# Patient Record
Sex: FEMALE | Race: WHITE | NOT HISPANIC OR LATINO | Employment: OTHER | URBAN - METROPOLITAN AREA
[De-identification: names, ages, dates, MRNs, and addresses within clinical notes are randomized per-mention and may not be internally consistent; named-entity substitution may affect disease eponyms.]

---

## 2023-10-19 ENCOUNTER — APPOINTMENT (OUTPATIENT)
Dept: RADIOLOGY | Facility: HOSPITAL | Age: 63
DRG: 637 | End: 2023-10-19
Payer: COMMERCIAL

## 2023-10-19 ENCOUNTER — HOSPITAL ENCOUNTER (OUTPATIENT)
Dept: CARDIOLOGY | Facility: HOSPITAL | Age: 63
Discharge: HOME | End: 2023-10-19
Payer: COMMERCIAL

## 2023-10-19 ENCOUNTER — HOSPITAL ENCOUNTER (INPATIENT)
Facility: HOSPITAL | Age: 63
LOS: 6 days | Discharge: HOME | DRG: 637 | End: 2023-10-25
Attending: STUDENT IN AN ORGANIZED HEALTH CARE EDUCATION/TRAINING PROGRAM | Admitting: ANESTHESIOLOGY
Payer: COMMERCIAL

## 2023-10-19 DIAGNOSIS — R01.1 HOLOSYSTOLIC MURMUR: ICD-10-CM

## 2023-10-19 DIAGNOSIS — R41.82 ALTERED MENTAL STATUS, UNSPECIFIED ALTERED MENTAL STATUS TYPE: ICD-10-CM

## 2023-10-19 DIAGNOSIS — E11.10 DKA, TYPE 2, NOT AT GOAL (MULTI): Primary | ICD-10-CM

## 2023-10-19 DIAGNOSIS — E87.20 METABOLIC ACIDOSIS: ICD-10-CM

## 2023-10-19 LAB
ALBUMIN SERPL BCP-MCNC: 4.1 G/DL (ref 3.4–5)
ALP SERPL-CCNC: 104 U/L (ref 33–136)
ALT SERPL W P-5'-P-CCNC: 18 U/L (ref 7–45)
ANION GAP SERPL CALC-SCNC: 26 MMOL/L (ref 10–20)
ANION GAP SERPL CALC-SCNC: 29 MMOL/L (ref 10–20)
ANION GAP SERPL CALC-SCNC: 36 MMOL/L (ref 10–20)
APPEARANCE UR: CLEAR
AST SERPL W P-5'-P-CCNC: 18 U/L (ref 9–39)
B-OH-BUTYR SERPL-SCNC: 12.72 MMOL/L (ref 0.02–0.27)
BACTERIA #/AREA URNS AUTO: ABNORMAL /HPF
BASE EXCESS BLDV CALC-SCNC: -17.3 MMOL/L (ref -2–3)
BASE EXCESS BLDV CALC-SCNC: -27.5 MMOL/L (ref -2–3)
BASOPHILS # BLD MANUAL: 0.15 X10*3/UL (ref 0–0.1)
BASOPHILS NFR BLD MANUAL: 1 %
BILIRUB SERPL-MCNC: 0.3 MG/DL (ref 0–1.2)
BILIRUB UR STRIP.AUTO-MCNC: NEGATIVE MG/DL
BODY TEMPERATURE: 37 DEGREES CELSIUS
BODY TEMPERATURE: 37 DEGREES CELSIUS
BUN SERPL-MCNC: 36 MG/DL (ref 6–23)
BUN SERPL-MCNC: 37 MG/DL (ref 6–23)
BUN SERPL-MCNC: 37 MG/DL (ref 6–23)
BURR CELLS BLD QL SMEAR: ABNORMAL
CALCIUM SERPL-MCNC: 10.6 MG/DL (ref 8.6–10.3)
CALCIUM SERPL-MCNC: 10.7 MG/DL (ref 8.6–10.3)
CALCIUM SERPL-MCNC: 9.8 MG/DL (ref 8.6–10.3)
CARDIAC TROPONIN I PNL SERPL HS: 11 NG/L (ref 0–13)
CHLORIDE SERPL-SCNC: 100 MMOL/L (ref 98–107)
CHLORIDE SERPL-SCNC: 101 MMOL/L (ref 98–107)
CHLORIDE SERPL-SCNC: 92 MMOL/L (ref 98–107)
CO2 SERPL-SCNC: 12 MMOL/L (ref 21–32)
CO2 SERPL-SCNC: 5 MMOL/L (ref 21–32)
CO2 SERPL-SCNC: 9 MMOL/L (ref 21–32)
COLOR UR: YELLOW
CREAT SERPL-MCNC: 1.48 MG/DL (ref 0.5–1.05)
CREAT SERPL-MCNC: 1.64 MG/DL (ref 0.5–1.05)
CREAT SERPL-MCNC: 1.77 MG/DL (ref 0.5–1.05)
CRITICAL CALL TIME: 1602
CRITICAL CALL TIME: 2026
CRITICAL CALLED BY: ABNORMAL
CRITICAL CALLED BY: ABNORMAL
CRITICAL CALLED TO: ABNORMAL
CRITICAL CALLED TO: ABNORMAL
CRITICAL NOTE: ABNORMAL
CRITICAL READ BACK: ABNORMAL
CRITICAL READ BACK: ABNORMAL
EOSINOPHIL # BLD MANUAL: 0 X10*3/UL (ref 0–0.7)
EOSINOPHIL NFR BLD MANUAL: 0 %
ERYTHROCYTE [DISTWIDTH] IN BLOOD BY AUTOMATED COUNT: 13.4 % (ref 11.5–14.5)
GFR SERPL CREATININE-BSD FRML MDRD: 32 ML/MIN/1.73M*2
GFR SERPL CREATININE-BSD FRML MDRD: 35 ML/MIN/1.73M*2
GFR SERPL CREATININE-BSD FRML MDRD: 40 ML/MIN/1.73M*2
GLUCOSE BLD MANUAL STRIP-MCNC: 530 MG/DL (ref 74–99)
GLUCOSE BLD MANUAL STRIP-MCNC: 557 MG/DL (ref 74–99)
GLUCOSE BLD MANUAL STRIP-MCNC: >600 MG/DL (ref 74–99)
GLUCOSE SERPL-MCNC: 658 MG/DL (ref 74–99)
GLUCOSE SERPL-MCNC: 658 MG/DL (ref 74–99)
GLUCOSE SERPL-MCNC: 732 MG/DL (ref 74–99)
GLUCOSE SERPL-MCNC: 964 MG/DL (ref 74–99)
GLUCOSE UR STRIP.AUTO-MCNC: ABNORMAL MG/DL
HCO3 BLDV-SCNC: 10 MMOL/L (ref 22–26)
HCO3 BLDV-SCNC: 3.9 MMOL/L (ref 22–26)
HCT VFR BLD AUTO: 39.1 % (ref 36–46)
HGB BLD-MCNC: 11.4 G/DL (ref 12–16)
IMM GRANULOCYTES # BLD AUTO: 0.31 X10*3/UL (ref 0–0.7)
IMM GRANULOCYTES NFR BLD AUTO: 2 % (ref 0–0.9)
INHALED O2 CONCENTRATION: 21 %
INHALED O2 CONCENTRATION: 21 %
KETONES UR STRIP.AUTO-MCNC: ABNORMAL MG/DL
LEUKOCYTE ESTERASE UR QL STRIP.AUTO: ABNORMAL
LYMPHOCYTES # BLD MANUAL: 0.77 X10*3/UL (ref 1.2–4.8)
LYMPHOCYTES NFR BLD MANUAL: 5 %
MAGNESIUM SERPL-MCNC: 2.6 MG/DL (ref 1.6–2.4)
MCH RBC QN AUTO: 34.2 PG (ref 26–34)
MCHC RBC AUTO-ENTMCNC: 29.2 G/DL (ref 32–36)
MCV RBC AUTO: 117 FL (ref 80–100)
METAMYELOCYTES # BLD MANUAL: 0.31 X10*3/UL
METAMYELOCYTES NFR BLD MANUAL: 2 %
MONOCYTES # BLD MANUAL: 1.53 X10*3/UL (ref 0.1–1)
MONOCYTES NFR BLD MANUAL: 10 %
MUCOUS THREADS #/AREA URNS AUTO: ABNORMAL /LPF
NEUTROPHILS # BLD MANUAL: 12.55 X10*3/UL (ref 1.2–7.7)
NEUTS BAND # BLD MANUAL: 0.77 X10*3/UL (ref 0–0.7)
NEUTS BAND NFR BLD MANUAL: 5 %
NEUTS SEG # BLD MANUAL: 11.78 X10*3/UL (ref 1.2–7)
NEUTS SEG NFR BLD MANUAL: 77 %
NITRITE UR QL STRIP.AUTO: NEGATIVE
NRBC BLD-RTO: 0 /100 WBCS (ref 0–0)
OVALOCYTES BLD QL SMEAR: ABNORMAL
OXYHGB MFR BLDV: 42.6 % (ref 45–75)
OXYHGB MFR BLDV: 71 % (ref 45–75)
PATH REVIEW-CBC DIFFERENTIAL: NORMAL
PCO2 BLDV: 19 MM HG (ref 41–51)
PCO2 BLDV: 28 MM HG (ref 41–51)
PH BLDV: 6.92 PH (ref 7.33–7.43)
PH BLDV: 7.16 PH (ref 7.33–7.43)
PH UR STRIP.AUTO: 5 [PH]
PLATELET # BLD AUTO: 319 X10*3/UL (ref 150–450)
PMV BLD AUTO: 10.8 FL (ref 7.5–11.5)
PO2 BLDV: 20 MM HG (ref 35–45)
PO2 BLDV: 44 MM HG (ref 35–45)
POTASSIUM SERPL-SCNC: 3.7 MMOL/L (ref 3.5–5.3)
POTASSIUM SERPL-SCNC: 3.8 MMOL/L (ref 3.5–5.3)
POTASSIUM SERPL-SCNC: 5 MMOL/L (ref 3.5–5.3)
PROT SERPL-MCNC: 7.5 G/DL (ref 6.4–8.2)
PROT UR STRIP.AUTO-MCNC: ABNORMAL MG/DL
RBC # BLD AUTO: 3.33 X10*6/UL (ref 4–5.2)
RBC # UR STRIP.AUTO: ABNORMAL /UL
RBC #/AREA URNS AUTO: ABNORMAL /HPF
RBC MORPH BLD: ABNORMAL
SAO2 % BLDV: 43 % (ref 45–75)
SAO2 % BLDV: 72 % (ref 45–75)
SODIUM SERPL-SCNC: 128 MMOL/L (ref 136–145)
SODIUM SERPL-SCNC: 134 MMOL/L (ref 136–145)
SODIUM SERPL-SCNC: 135 MMOL/L (ref 136–145)
SP GR UR STRIP.AUTO: 1.01
TEST COMMENT: ABNORMAL
TEST COMMENT: ABNORMAL
TOTAL CELLS COUNTED BLD: 100
UROBILINOGEN UR STRIP.AUTO-MCNC: <2 MG/DL
WBC # BLD AUTO: 15.3 X10*3/UL (ref 4.4–11.3)
WBC #/AREA URNS AUTO: ABNORMAL /HPF

## 2023-10-19 PROCEDURE — 82947 ASSAY GLUCOSE BLOOD QUANT: CPT

## 2023-10-19 PROCEDURE — 85007 BL SMEAR W/DIFF WBC COUNT: CPT | Performed by: STUDENT IN AN ORGANIZED HEALTH CARE EDUCATION/TRAINING PROGRAM

## 2023-10-19 PROCEDURE — 87086 URINE CULTURE/COLONY COUNT: CPT | Mod: CMCLAB,PARLAB | Performed by: STUDENT IN AN ORGANIZED HEALTH CARE EDUCATION/TRAINING PROGRAM

## 2023-10-19 PROCEDURE — 96372 THER/PROPH/DIAG INJ SC/IM: CPT

## 2023-10-19 PROCEDURE — 2500000005 HC RX 250 GENERAL PHARMACY W/O HCPCS: Performed by: STUDENT IN AN ORGANIZED HEALTH CARE EDUCATION/TRAINING PROGRAM

## 2023-10-19 PROCEDURE — 70450 CT HEAD/BRAIN W/O DYE: CPT | Performed by: RADIOLOGY

## 2023-10-19 PROCEDURE — 71275 CT ANGIOGRAPHY CHEST: CPT | Performed by: RADIOLOGY

## 2023-10-19 PROCEDURE — 2500000004 HC RX 250 GENERAL PHARMACY W/ HCPCS (ALT 636 FOR OP/ED)

## 2023-10-19 PROCEDURE — 87040 BLOOD CULTURE FOR BACTERIA: CPT | Mod: CMCLAB,PARLAB | Performed by: STUDENT IN AN ORGANIZED HEALTH CARE EDUCATION/TRAINING PROGRAM

## 2023-10-19 PROCEDURE — 99285 EMERGENCY DEPT VISIT HI MDM: CPT | Mod: 25 | Performed by: STUDENT IN AN ORGANIZED HEALTH CARE EDUCATION/TRAINING PROGRAM

## 2023-10-19 PROCEDURE — 82010 KETONE BODYS QUAN: CPT | Performed by: STUDENT IN AN ORGANIZED HEALTH CARE EDUCATION/TRAINING PROGRAM

## 2023-10-19 PROCEDURE — 85027 COMPLETE CBC AUTOMATED: CPT | Performed by: STUDENT IN AN ORGANIZED HEALTH CARE EDUCATION/TRAINING PROGRAM

## 2023-10-19 PROCEDURE — 71045 X-RAY EXAM CHEST 1 VIEW: CPT | Performed by: RADIOLOGY

## 2023-10-19 PROCEDURE — 93005 ELECTROCARDIOGRAM TRACING: CPT

## 2023-10-19 PROCEDURE — 2550000001 HC RX 255 CONTRASTS: Performed by: STUDENT IN AN ORGANIZED HEALTH CARE EDUCATION/TRAINING PROGRAM

## 2023-10-19 PROCEDURE — 2500000002 HC RX 250 W HCPCS SELF ADMINISTERED DRUGS (ALT 637 FOR MEDICARE OP, ALT 636 FOR OP/ED): Performed by: STUDENT IN AN ORGANIZED HEALTH CARE EDUCATION/TRAINING PROGRAM

## 2023-10-19 PROCEDURE — 80048 BASIC METABOLIC PNL TOTAL CA: CPT

## 2023-10-19 PROCEDURE — 83735 ASSAY OF MAGNESIUM: CPT | Performed by: STUDENT IN AN ORGANIZED HEALTH CARE EDUCATION/TRAINING PROGRAM

## 2023-10-19 PROCEDURE — 2500000004 HC RX 250 GENERAL PHARMACY W/ HCPCS (ALT 636 FOR OP/ED): Performed by: STUDENT IN AN ORGANIZED HEALTH CARE EDUCATION/TRAINING PROGRAM

## 2023-10-19 PROCEDURE — 74174 CTA ABD&PLVS W/CONTRAST: CPT | Performed by: RADIOLOGY

## 2023-10-19 PROCEDURE — 82805 BLOOD GASES W/O2 SATURATION: CPT

## 2023-10-19 PROCEDURE — 36415 COLL VENOUS BLD VENIPUNCTURE: CPT

## 2023-10-19 PROCEDURE — 82805 BLOOD GASES W/O2 SATURATION: CPT | Performed by: STUDENT IN AN ORGANIZED HEALTH CARE EDUCATION/TRAINING PROGRAM

## 2023-10-19 PROCEDURE — 36415 COLL VENOUS BLD VENIPUNCTURE: CPT | Performed by: STUDENT IN AN ORGANIZED HEALTH CARE EDUCATION/TRAINING PROGRAM

## 2023-10-19 PROCEDURE — 84075 ASSAY ALKALINE PHOSPHATASE: CPT | Performed by: STUDENT IN AN ORGANIZED HEALTH CARE EDUCATION/TRAINING PROGRAM

## 2023-10-19 PROCEDURE — 71275 CT ANGIOGRAPHY CHEST: CPT

## 2023-10-19 PROCEDURE — S0073 INJECTION, AZTREONAM, 500 MG: HCPCS

## 2023-10-19 PROCEDURE — 81001 URINALYSIS AUTO W/SCOPE: CPT | Performed by: STUDENT IN AN ORGANIZED HEALTH CARE EDUCATION/TRAINING PROGRAM

## 2023-10-19 PROCEDURE — 2500000002 HC RX 250 W HCPCS SELF ADMINISTERED DRUGS (ALT 637 FOR MEDICARE OP, ALT 636 FOR OP/ED)

## 2023-10-19 PROCEDURE — 70450 CT HEAD/BRAIN W/O DYE: CPT | Mod: MG

## 2023-10-19 PROCEDURE — 71045 X-RAY EXAM CHEST 1 VIEW: CPT | Mod: FY

## 2023-10-19 PROCEDURE — 2020000001 HC ICU ROOM DAILY

## 2023-10-19 PROCEDURE — 84484 ASSAY OF TROPONIN QUANT: CPT | Performed by: STUDENT IN AN ORGANIZED HEALTH CARE EDUCATION/TRAINING PROGRAM

## 2023-10-19 RX ORDER — HEPARIN SODIUM 5000 [USP'U]/ML
5000 INJECTION, SOLUTION INTRAVENOUS; SUBCUTANEOUS EVERY 8 HOURS SCHEDULED
Status: DISCONTINUED | OUTPATIENT
Start: 2023-10-19 | End: 2023-10-25 | Stop reason: HOSPADM

## 2023-10-19 RX ORDER — PANTOPRAZOLE SODIUM 40 MG/1
40 TABLET, DELAYED RELEASE ORAL
Status: DISCONTINUED | OUTPATIENT
Start: 2023-10-20 | End: 2023-10-25 | Stop reason: HOSPADM

## 2023-10-19 RX ORDER — SODIUM BICARBONATE 1 MEQ/ML
50 SYRINGE (ML) INTRAVENOUS
Status: COMPLETED | OUTPATIENT
Start: 2023-10-19 | End: 2023-10-19

## 2023-10-19 RX ORDER — SODIUM CHLORIDE, SODIUM LACTATE, POTASSIUM CHLORIDE, CALCIUM CHLORIDE 600; 310; 30; 20 MG/100ML; MG/100ML; MG/100ML; MG/100ML
125 INJECTION, SOLUTION INTRAVENOUS ONCE
Status: DISCONTINUED | OUTPATIENT
Start: 2023-10-19 | End: 2023-10-19

## 2023-10-19 RX ORDER — ESOMEPRAZOLE MAGNESIUM 40 MG/1
40 GRANULE, DELAYED RELEASE ORAL
Status: DISCONTINUED | OUTPATIENT
Start: 2023-10-20 | End: 2023-10-19 | Stop reason: RX

## 2023-10-19 RX ORDER — PANTOPRAZOLE SODIUM 40 MG/10ML
40 INJECTION, POWDER, LYOPHILIZED, FOR SOLUTION INTRAVENOUS
Status: DISCONTINUED | OUTPATIENT
Start: 2023-10-20 | End: 2023-10-25 | Stop reason: HOSPADM

## 2023-10-19 RX ORDER — SODIUM CHLORIDE, SODIUM LACTATE, POTASSIUM CHLORIDE, CALCIUM CHLORIDE 600; 310; 30; 20 MG/100ML; MG/100ML; MG/100ML; MG/100ML
150 INJECTION, SOLUTION INTRAVENOUS CONTINUOUS
Status: DISCONTINUED | OUTPATIENT
Start: 2023-10-19 | End: 2023-10-19

## 2023-10-19 RX ORDER — INSULIN GLARGINE 100 [IU]/ML
10 INJECTION, SOLUTION SUBCUTANEOUS NIGHTLY
Status: DISCONTINUED | OUTPATIENT
Start: 2023-10-19 | End: 2023-10-20

## 2023-10-19 RX ORDER — DEXTROSE 50 % IN WATER (D50W) INTRAVENOUS SYRINGE
25
Status: DISCONTINUED | OUTPATIENT
Start: 2023-10-19 | End: 2023-10-20 | Stop reason: WASHOUT

## 2023-10-19 RX ORDER — VANCOMYCIN HYDROCHLORIDE 1 G/200ML
1000 INJECTION, SOLUTION INTRAVENOUS ONCE
Status: COMPLETED | OUTPATIENT
Start: 2023-10-19 | End: 2023-10-19

## 2023-10-19 RX ORDER — AZTREONAM 2 G/1
2 INJECTION, POWDER, LYOPHILIZED, FOR SOLUTION INTRAMUSCULAR; INTRAVENOUS ONCE
Status: COMPLETED | OUTPATIENT
Start: 2023-10-19 | End: 2023-10-19

## 2023-10-19 RX ORDER — ONDANSETRON HYDROCHLORIDE 2 MG/ML
8 INJECTION, SOLUTION INTRAVENOUS EVERY 8 HOURS PRN
Status: DISCONTINUED | OUTPATIENT
Start: 2023-10-19 | End: 2023-10-25 | Stop reason: HOSPADM

## 2023-10-19 RX ORDER — DEXTROSE MONOHYDRATE 100 MG/ML
0.3 INJECTION, SOLUTION INTRAVENOUS ONCE AS NEEDED
Status: DISCONTINUED | OUTPATIENT
Start: 2023-10-19 | End: 2023-10-20 | Stop reason: WASHOUT

## 2023-10-19 RX ORDER — SODIUM CHLORIDE, SODIUM LACTATE, POTASSIUM CHLORIDE, CALCIUM CHLORIDE 600; 310; 30; 20 MG/100ML; MG/100ML; MG/100ML; MG/100ML
125 INJECTION, SOLUTION INTRAVENOUS CONTINUOUS
Status: DISCONTINUED | OUTPATIENT
Start: 2023-10-19 | End: 2023-10-19 | Stop reason: SDUPTHER

## 2023-10-19 RX ADMIN — SODIUM BICARBONATE 50 MEQ: 84 INJECTION INTRAVENOUS at 16:54

## 2023-10-19 RX ADMIN — IOHEXOL 80 ML: 350 INJECTION, SOLUTION INTRAVENOUS at 15:26

## 2023-10-19 RX ADMIN — INSULIN HUMAN 6 UNITS: 100 INJECTION, SOLUTION PARENTERAL at 20:32

## 2023-10-19 RX ADMIN — SODIUM CHLORIDE, POTASSIUM CHLORIDE, SODIUM LACTATE AND CALCIUM CHLORIDE 150 ML: 600; 310; 30; 20 INJECTION, SOLUTION INTRAVENOUS at 22:11

## 2023-10-19 RX ADMIN — AZTREONAM 2 G: 2 INJECTION, POWDER, LYOPHILIZED, FOR SOLUTION INTRAMUSCULAR; INTRAVENOUS at 22:00

## 2023-10-19 RX ADMIN — VANCOMYCIN HYDROCHLORIDE 1000 MG: 1 INJECTION, SOLUTION INTRAVENOUS at 20:35

## 2023-10-19 RX ADMIN — HEPARIN SODIUM 5000 UNITS: 5000 INJECTION INTRAVENOUS; SUBCUTANEOUS at 22:59

## 2023-10-19 RX ADMIN — INSULIN HUMAN 6 UNITS: 100 INJECTION, SOLUTION PARENTERAL at 18:22

## 2023-10-19 RX ADMIN — INSULIN GLARGINE 10 UNITS: 100 INJECTION, SOLUTION SUBCUTANEOUS at 22:13

## 2023-10-19 RX ADMIN — SODIUM BICARBONATE 50 MEQ: 84 INJECTION INTRAVENOUS at 17:07

## 2023-10-19 RX ADMIN — SODIUM BICARBONATE 50 MEQ: 84 INJECTION INTRAVENOUS at 17:12

## 2023-10-19 RX ADMIN — INSULIN HUMAN 6 UNITS/HR: 1 INJECTION, SOLUTION INTRAVENOUS at 16:50

## 2023-10-19 RX ADMIN — INSULIN HUMAN 6 UNITS: 100 INJECTION, SOLUTION PARENTERAL at 19:19

## 2023-10-19 RX ADMIN — POTASSIUM CHLORIDE 125 ML/HR: 2 INJECTION, SOLUTION, CONCENTRATE INTRAVENOUS at 23:19

## 2023-10-19 RX ADMIN — SODIUM CHLORIDE, POTASSIUM CHLORIDE, SODIUM LACTATE AND CALCIUM CHLORIDE 2000 ML: 600; 310; 30; 20 INJECTION, SOLUTION INTRAVENOUS at 15:52

## 2023-10-19 ASSESSMENT — PAIN SCALES - GENERAL: PAINLEVEL_OUTOF10: 0 - NO PAIN

## 2023-10-19 ASSESSMENT — PAIN - FUNCTIONAL ASSESSMENT: PAIN_FUNCTIONAL_ASSESSMENT: 0-10

## 2023-10-19 NOTE — ED PROVIDER NOTES
EMERGENCY DEPARTMENT ENCOUNTER      Pt Name: Ivelisse Paulino  MRN: [id number]  Birthdate 1960  Date of evaluation: 10/19/2023  Provider: Anthony Johns DO    CHIEF COMPLAINT       Chief Complaint   Patient presents with   • Chest Pain   • Altered Mental Status     Patient arrives from home w/ complaints of chestpain 9/10 and confusion x2.   Patient from home       HISTORY OF PRESENT ILLNESS    Ivelisse Paulino is a 63 y.o. female who presents to the emergency department with EMS for reported chest pain and altered mental status.  Patient lives home alone per story patient was being checked on by her neighbor found to be confused therefore EMS was called.  Neighbor does report that she does have history of diabetes which is relatively uncontrolled she is uncertain of any additional medical history.  In route by EMS they did relay EKG which was read by the computer as acute STEMI.  Prior to arrival it was decided to await patient's arrival and repeat EKG as the EKG appeared to be borderline and patient is confused.            Nursing Notes were reviewed.    REVIEW OF SYSTEMS     Review of systems unobtainable secondary to patient's altered mentation at this time.    PAST MEDICAL HISTORY     Past Medical History:   Diagnosis Date   • Encounter for screening for malignant neoplasm of colon 09/17/2018    Screening for colon cancer   • Essential (primary) hypertension 02/11/2019    HTN (hypertension), benign   • Personal history of other diseases of the digestive system     History of esophageal reflux   • Personal history of other diseases of the nervous system and sense organs     History of cluster headache   • Personal history of other diseases of the respiratory system     History of pleurisy   • Personal history of other diseases of urinary system     History of bladder problems   • Personal history of other endocrine, nutritional and metabolic disease     History of hypothyroidism   • Personal history of  other endocrine, nutritional and metabolic disease 02/11/2019    History of type 2 diabetes mellitus   • Personal history of other endocrine, nutritional and metabolic disease 02/11/2019    History of hypothyroidism   • Personal history of other endocrine, nutritional and metabolic disease 05/13/2019    History of type 2 diabetes mellitus   • Personal history of other endocrine, nutritional and metabolic disease 04/15/2019    History of type 2 diabetes mellitus   • Personal history of other endocrine, nutritional and metabolic disease 01/09/2019    History of type 2 diabetes mellitus   • Personal history of other endocrine, nutritional and metabolic disease 03/13/2018    History of hypothyroidism   • Personal history of other medical treatment 03/01/2016    History of screening mammography   • Systemic lupus erythematosus, unspecified (CMS/HCC)     History of systemic lupus erythematosus (SLE)         SURGICAL HISTORY       Past Surgical History:   Procedure Laterality Date   • BREAST LUMPECTOMY  09/17/2018    Breast Surgery Lumpectomy   • CARPAL TUNNEL RELEASE  09/17/2018    Neuroplasty Decompression Median Nerve At Carpal Tunnel   • HAND TENDON SURGERY  09/17/2018    Hand Incision Tendon Sheath Of A Finger   • OTHER SURGICAL HISTORY  09/17/2018    Surgery Of Esophagus       ALLERGIES     Patient has no allergy information on record.    FAMILY HISTORY     No family history on file.       SOCIAL HISTORY       Social History     Socioeconomic History   • Marital status: Single     Spouse name: Not on file   • Number of children: Not on file   • Years of education: Not on file   • Highest education level: Not on file   Occupational History   • Not on file   Tobacco Use   • Smoking status: Not on file   • Smokeless tobacco: Not on file   Substance and Sexual Activity   • Alcohol use: Not on file   • Drug use: Not on file   • Sexual activity: Not on file   Other Topics Concern   • Not on file   Social History Narrative    • Not on file     Social Determinants of Health     Financial Resource Strain: Not on file   Food Insecurity: Not on file   Transportation Needs: Not on file   Physical Activity: Not on file   Stress: Not on file   Social Connections: Not on file   Intimate Partner Violence: Not on file   Housing Stability: Not on file       PHYSICAL EXAM   VS: As documented in the triage note from today's date and EMR flowsheet were reviewed.  Gen: Cachectic.  Seated in bed. Visibly uncomfortable diaphoretic alert and oriented to person alone.  Skin: Warm. Dry. Intact. No rashes or lesions.  Eyes: Pupils equally round and reactive to light. Clear sclera.   HENT: Atraumatic appearance. Mucosal membranes dry. No oral lesions, uvula midline, airway patent.   CV: Tachycardic rate and regular rhythm. S1, S2. No pedal edema. Warm extremities.  Resp: Nonlabored breathing Clear to auscultation bilaterally. No increased work of breathing.   GI: Soft and nontender. No rebound or guarding. Bowel sounds x4 present.  Martínez sign McBurney's point tenderness is negative no CVA tenderness.  MSK: Symmetric muscle bulk. No joint swelling in the extremities. Compartments are soft. Neurovascularly intact x4 extremities. Radial pulses +2 equal bilaterally.   Neuro: Speech fluent. Moving all extremities. No focal deficits.  Psych: Disheveled.    DIAGNOSTIC RESULTS   EKG: All EKG's are interpreted by the Emergency Department Physician who either signs or Co-signs this chart.    Sinus tachycardia at a rate of 107 beats per minute with NC interval 139 , QRS of 94 , QTc of 498.  Very minimal ST elevations in lateral leads although poor baseline. Compared to prior dated 4/15/2019, there are very minimal ST changes.      RADIOLOGY:   Non-plain film images such as CT, Ultrasound and MRI are read by the radiologist. Plain radiographic images are visualized and preliminarily interpreted by the emergency physician with the below findings: Chest x-ray without  any evidence of widened mediastinum.      Interpretation per the Radiologist below, if available at the time of this note:    Transthoracic Echo (TTE) Complete   Final Result      XR chest 1 view   Final Result   Trace stable pleural scarring at the lateral right base. Otherwise,   negative exam.        Signed by: Sampson Tubbs 10/19/2023 4:23 PM   Dictation workstation:   [id number]      CT angio chest abdomen pelvis   Final Result   No evidence of pulmonary embolism.        No thoracoabdominal aortic aneurysm or dissection.        No pneumonia.        Marked diffuse hepatic steatosis.        Questionable gastroduodenitis.        No evidence of bowel obstruction.        Normal appendix.        MACRO:   None        Signed by: Jude Calhoun 10/19/2023 4:11 PM   Dictation workstation:   [id number]      CT head wo IV contrast   Final Result   No CT evidence of acute intracranial abnormality.        Signed by: Anthony Reyes 10/19/2023 3:32 PM   Dictation workstation:   [id number]            ED BEDSIDE ULTRASOUND:   Performed by ED Physician - none    LABS:  Labs Reviewed   CBC WITH AUTO DIFFERENTIAL - Abnormal       Result Value    WBC 15.3 (*)     nRBC 0.0      RBC 3.33 (*)     Hemoglobin 11.4 (*)     Hematocrit 39.1       (*)     MCH 34.2 (*)     MCHC 29.2 (*)     RDW 13.4      Platelets 319      MPV 10.8      Immature Granulocytes %, Automated 2.0 (*)     Immature Granulocytes Absolute, Automated 0.31     COMPREHENSIVE METABOLIC PANEL - Abnormal    Glucose 964 (*)     Sodium 128 (*)     Potassium 5.0      Chloride 92 (*)     Bicarbonate 5 (*)     Anion Gap 36 (*)     Urea Nitrogen 36 (*)     Creatinine 1.77 (*)     eGFR 32 (*)     Calcium 10.7 (*)     Albumin 4.1      Alkaline Phosphatase 104      Total Protein 7.5      AST 18      Bilirubin, Total 0.3      ALT 18     MAGNESIUM - Abnormal    Magnesium 2.60 (*)    URINALYSIS WITH REFLEX MICROSCOPIC AND CULTURE - Abnormal    Color, Urine Yellow       Appearance, Urine Clear      Specific Gravity, Urine 1.015      pH, Urine 5.0      Protein, Urine 30 (1+) (*)     Glucose, Urine >=500 (3+) (*)     Blood, Urine SMALL (1+) (*)     Ketones, Urine 80 (2+) (*)     Bilirubin, Urine NEGATIVE      Urobilinogen, Urine <2.0      Nitrite, Urine NEGATIVE      Leukocyte Esterase, Urine TRACE (*)    BLOOD GAS VENOUS - Abnormal    POCT pH, Venous 6.92 (*)     POCT pCO2, Venous 19 (*)     POCT pO2, Venous 44      POCT SO2, Venous 72      POCT Oxy Hemoglobin, Venous 71.0      POCT Base Excess, Venous -27.5 (*)     POCT HCO3 Calculated, Venous 3.9 (*)     Patient Temperature 37.0      FiO2 21      Test Comment ICU-S      Critical Called By LOPEZ CELESTIN      Critical Called To EDWINA RUDD DO      Critical Call Time 1602.0000      Critical Read Back Y     MICROSCOPIC ONLY, URINE - Abnormal    WBC, Urine 1-5      RBC, Urine NONE      Bacteria, Urine 1+ (*)     Mucus, Urine 1+     BETA HYDROXYBUTYRATE - Abnormal    Beta-Hydroxybutyrate 12.72 (*)     Narrative:     The beta-hydroxybutyrate test performance characteristics have been validated by  [facility]. This test has not been approved by the FDA; however,such approval is not necessary.       CBC - Abnormal    WBC 13.2 (*)     nRBC 0.0      RBC 3.22 (*)     Hemoglobin 10.8 (*)     Hematocrit 32.9 (*)      (*)     MCH 33.5      MCHC 32.8      RDW 12.9      Platelets 227      MPV 9.9     BLOOD GAS VENOUS - Abnormal    POCT pH, Venous 7.16 (*)     POCT pCO2, Venous 28 (*)     POCT pO2, Venous 20 (*)     POCT SO2, Venous 43 (*)     POCT Oxy Hemoglobin, Venous 42.6 (*)     POCT Base Excess, Venous -17.3 (*)     POCT HCO3 Calculated, Venous 10.0 (*)     Patient Temperature 37.0      FiO2 21      Test Comment ICU-S      Critical Called By MARY ANDINO RRT      Critical Called To DR. STARK      Critical Call Time 2026.0000      Critical Read Back Y      Critical Note RAN X2     BASIC  METABOLIC PANEL - Abnormal    Glucose 732 (*)     Sodium 135 (*)     Potassium 3.7      Chloride 101      Bicarbonate 9 (*)     Anion Gap 29 (*)     Urea Nitrogen 37 (*)     Creatinine 1.48 (*)     eGFR 40 (*)     Calcium 9.8     GLUCOSE - Abnormal    Glucose 658 (*)    BASIC METABOLIC PANEL - Abnormal    Glucose 658 (*)     Sodium 134 (*)     Potassium 3.8      Chloride 100      Bicarbonate 12 (*)     Anion Gap 26 (*)     Urea Nitrogen 37 (*)     Creatinine 1.64 (*)     eGFR 35 (*)     Calcium 10.6 (*)    BASIC METABOLIC PANEL - Abnormal    Glucose 274 (*)     Sodium 140      Potassium 3.6      Chloride 110 (*)     Bicarbonate 24      Anion Gap 10      Urea Nitrogen 34 (*)     Creatinine 1.48 (*)     eGFR 40 (*)     Calcium 10.2     BASIC METABOLIC PANEL - Abnormal    Glucose 559 (*)     Sodium 136      Potassium 3.6      Chloride 104      Bicarbonate 18 (*)     Anion Gap 18      Urea Nitrogen 38 (*)     Creatinine 1.57 (*)     eGFR 37 (*)     Calcium 10.2     BASIC METABOLIC PANEL - Abnormal    Glucose 141 (*)     Sodium 140      Potassium 3.5      Chloride 108 (*)     Bicarbonate 24      Anion Gap 12      Urea Nitrogen 31 (*)     Creatinine 1.53 (*)     eGFR 38 (*)     Calcium 10.3     HEMOGLOBIN A1C - Abnormal    Hemoglobin A1C 15.3 (*)     Estimated Average Glucose 392      Narrative:     Diagnosis of Diabetes-Adults  Non-Diabetic: < or = 5.6%  Increased risk for developing diabetes: 5.7-6.4%  Diagnostic of diabetes: > or = 6.5%    Monitoring of Diabetes  Age (y)....................... Therapeutic Goal (%)  Adults: >18.........................<7.0  Pediatrics: 13-18...................<7.5  Pediatrics: 7-12....................<8.0  Pediatrics: 0-6..................... 7.5-8.5    American Diabetes Association. Diabetes Care 33(S1), Jan 2010       ALBUMIN, URINE RANDOM - Abnormal    Albumin, Urine Random 67.6      Creatinine, Urine Random 59.4      Albumin/Creatine Ratio 113.8 (*)    TSH - Abnormal    Thyroid  Stimulating Hormone 4.78 (*)     Narrative:     TSH testing is performed using different testing methodology at [facility] than at other [facility] hospitals. Direct result comparisons should only be made within the same method.     CORTISOL AM - Abnormal    Cortisol  A.M. 36.3 (*)    BLOOD GAS VENOUS FULL PANEL - Abnormal    POCT pH, Venous 7.38      POCT pCO2, Venous 38 (*)     POCT pO2, Venous 47 (*)     POCT SO2, Venous 86 (*)     POCT Oxy Hemoglobin, Venous 84.5 (*)     POCT Hematocrit Calculated, Venous 36.0      POCT Sodium, Venous 137      POCT Potassium, Venous 3.6      POCT Chloride, Venous 105      POCT Ionized [place], Venous 1.49 (*)     POCT Glucose, Venous 487 (*)     POCT Lactate, Venous 1.7      POCT Base Excess, Venous -2.3 (*)     POCT HCO3 Calculated, Venous 22.5      POCT Hemoglobin, Venous 11.9 (*)     POCT Anion Gap, Venous 13.0      Patient Temperature 37.0      FiO2 21      Critical Called By RAHEEM RIVERA RRT      Critical Called To DR MAZA      Critical Call Time 258.0000      Critical Read Back Y     BASIC METABOLIC PANEL - Abnormal    Glucose 231 (*)     Sodium 135 (*)     Potassium 3.7      Chloride 106      Bicarbonate 24      Anion Gap 9 (*)     Urea Nitrogen 26 (*)     Creatinine 1.14 (*)     eGFR 54 (*)     Calcium 9.6     SEDIMENTATION RATE, AUTOMATED - Abnormal    Sedimentation Rate 94 (*)    C-REACTIVE PROTEIN - Abnormal    C-Reactive Protein 15.52 (*)    POCT GLUCOSE - Abnormal    POCT Glucose >600 (*)    POCT GLUCOSE - Abnormal    POCT Glucose >600 (*)    POCT GLUCOSE - Abnormal    POCT Glucose >600 (*)    POCT GLUCOSE - Abnormal    POCT Glucose >600 (*)    POCT GLUCOSE - Abnormal    POCT Glucose 557 (*)    POCT GLUCOSE - Abnormal    POCT Glucose 530 (*)    POCT GLUCOSE - Abnormal    POCT Glucose 501 (*)    POCT GLUCOSE - Abnormal    POCT Glucose 481 (*)    POCT GLUCOSE - Abnormal    POCT Glucose 419 (*)    POCT GLUCOSE - Abnormal    POCT Glucose 337 (*)    POCT  GLUCOSE - Abnormal    POCT Glucose 279 (*)    POCT GLUCOSE - Abnormal    POCT Glucose 233 (*)    POCT GLUCOSE - Abnormal    POCT Glucose 187 (*)    POCT GLUCOSE - Abnormal    POCT Glucose 172 (*)    POCT GLUCOSE - Abnormal    POCT Glucose 71 (*)    POCT GLUCOSE - Abnormal    POCT Glucose 218 (*)    POCT GLUCOSE - Abnormal    POCT Glucose 127 (*)    POCT GLUCOSE - Abnormal    POCT Glucose 122 (*)    MANUAL DIFFERENTIAL - Abnormal    Neutrophils %, Manual 77.0      Bands %, Manual 5.0      Lymphocytes %, Manual 5.0      Monocytes %, Manual 10.0      Eosinophils %, Manual 0.0      Basophils %, Manual 1.0      Metamyelocytes %, Manual 2.0      Seg Neutrophils Absolute, Manual 11.78 (*)     Bands Absolute, Manual 0.77 (*)     Lymphocytes Absolute, Manual 0.77 (*)     Monocytes Absolute, Manual 1.53 (*)     Eosinophils Absolute, Manual 0.00      Basophils Absolute, Manual 0.15 (*)     Metamyelocytes Absolute, Manual 0.31      Total Cells Counted 100      Neutrophils Absolute, Manual 12.55 (*)     RBC Morphology See Below      Ovalocytes Few      [place] Cells Few     BLOOD GAS VENOUS UNSOLICITED - Abnormal    POCT pH, Venous 6.92 (*)     POCT pCO2, Venous 19 (*)     POCT pO2, Venous 44      POCT SO2, Venous 72      POCT Oxy Hemoglobin, Venous 71.0      POCT Base Excess, Venous -27.5 (*)     POCT HCO3 Calculated, Venous 3.9 (*)     Patient Temperature 37.0      FiO2 21      Test Comment ICU      Critical Called By LOPEZ RODRIGUEZ ART      Critical Called To EDWINA RUDD DO      Critical Call Time 1602.0000      Critical Read Back Y     URINE CULTURE - Normal    Urine Culture No growth     BLOOD CULTURE - Normal    Blood Culture No growth at 1 day     BLOOD CULTURE - Normal    Blood Culture No growth at 1 day     TROPONIN I, HIGH SENSITIVITY - Normal    Troponin I, High Sensitivity 11      Narrative:     Less than 99th percentile of normal range cutoff-  Female and children under 18 years old <14 ng/L; Male <21 ng/L:  Negative  Repeat testing should be performed if clinically indicated.     Female and children under 18 years old 14-50 ng/L; Male 21-50 ng/L:  Consistent with possible cardiac damage and possible increased clinical   risk. Serial measurements may help to assess extent of myocardial damage.     >50 ng/L: Consistent with cardiac damage, increased clinical risk and  myocardial infarction. Serial measurements may help assess extent of   myocardial damage.      NOTE: Children less than 1 year old may have higher baseline troponin   levels and results should be interpreted in conjunction with the overall   clinical context.     NOTE: Troponin I testing is performed using a different   testing methodology at [facility] than at other   [facility]. Direct result comparisons should only   be made within the same method.   LACTATE - Normal    Lactate 1.4      Narrative:     Venipuncture immediately after or during the administration of Metamizole may lead to falsely low results. Testing should be performed immediately  prior to Metamizole dosing.   MAGNESIUM - Normal    Magnesium 1.96     ACUTE TOXICOLOGY PANEL, BLOOD - Normal    Acetaminophen <10.0      Salicylate  <3      Alcohol <10     DRUG SCREEN,URINE - Normal    Amphetamine Screen, Urine Presumptive Negative      Barbiturate Screen, Urine Presumptive Negative      Benzodiazepines Screen, Urine Presumptive Negative      Cannabinoid Screen, Urine Presumptive Negative      Cocaine Metabolite Screen, Urine Presumptive Negative      Fentanyl Screen, Urine Presumptive Negative      Opiate Screen, Urine Presumptive Negative      Oxycodone Screen, Urine Presumptive Negative      PCP Screen, Urine Presumptive Negative      Narrative:     Drug screen results are presumptive and should not be used to assess   compliance with prescribed medication. Contact the performing [facility] laboratory   to add-on definitive confirmatory testing if clinically  indicated.    Toxicology screening results are reported qualitatively. The concentration must   be greater than or equal to the cutoff to be reported as positive. The concentration   at which the screening test can detect an individual drug or metabolite varies.   The absence of expected drug(s) and/or drug metabolite(s) may indicate non-compliance,   inappropriate timing of specimen collection relative to drug administration, poor drug   absorption, diluted/adulterated urine, or limitations of testing. For medical purposes   only; not valid for forensic use.    Interpretive questions should be directed to the laboratory medical directors.   SEDIMENTATION RATE, AUTOMATED - Normal    Sedimentation Rate 21     URINALYSIS WITH REFLEX MICROSCOPIC AND CULTURE    Narrative:     The following orders were created for panel order Urinalysis with Reflex Microscopic and Culture.  Procedure                               Abnormality         Status                     ---------                               -----------         ------                     Urinalysis with Reflex M...[[id number]]  Abnormal            Final result               Extra Urine Gray Tube[[id number]]                                                         Please view results for these tests on the individual orders.   EXTRA URINE GRAY TUBE   BASIC METABOLIC PANEL   PROCALCITONIN TEST   PROCALCITONIN TEST   CBC   BASIC METABOLIC PANEL   VANCOMYCIN   POCT GLUCOSE   POCT GLUCOSE   POCT GLUCOSE   POCT GLUCOSE   POCT GLUCOSE   POCT GLUCOSE   POCT GLUCOSE   POCT GLUCOSE   POCT GLUCOSE METER   PATH REVIEW-CBC DIFFERENTIAL    Pathologist Review-CBC Differential High MCV         All other labs were within normal range or not returned as of this dictation.    EMERGENCY DEPARTMENT COURSE/MDM:   Vitals:    Vitals:    10/19/23 1455   BP: 100/56   BP Location: Left arm   Patient Position: Lying   Pulse: 110   Resp: 20   Temp: 36.8 °C (98.2 °F)   TempSrc: Tympanic   SpO2:  "100%   Weight: 63.5 kg (140 lb)   Height: 1.702 m (5' 7\")       I reviewed the patient's triage vitals and they are tachycardic otherwise within normal range.    Due to the above findings the following was ordered cardiac work-up to include CT imaging of the head CT angio of the chest abdomen pelvis patient emergently taken to the CT scanner.    I did discuss with interventional cardiologist on-call EKG findings being read as acute STEMI I do not see reciprocal changes or any significant elevations he does agree with this he would hold off on calling the STEMI at this time and further worked the patient up.  Patient was emergently taken over for CTA.  She has no acute intracranial findings no evidence of mass lesion or intracranial bleed.  Per my read I do see what appears to be gastroparesis per radiology there is no obstruction.  I was able to obtain additional information from the patient's neighbor who arrived who admits that she is an uncontrolled diabetic and does not take her medications as she should regularly.    Lab work initially came back as a glucose of approximately 1 thymosin bicarbonate is near undetectable with a pH metabolic acidosis with respiratory compensation of 6.9.  Patient was started on insulin drip as well as a bicarbonate drip for the metabolic acidosis.  She remains confused I suspect this is secondary to DKA.  Potassium is 5 at this time no indication for supplementation.  Corrected sodium near normal.  Does have an KLEBER was given copious amounts of IV fluids.  She remains hemodynamically stable I did discuss findings with the intensivist regarding her condition he is excepted her for further treatment and evaluation while on continuous insulin drip.    Authorized and Performed by: Anthony Johns DO    Total critical care time: Approximately 55 minutes  All procedures were billed separately from critical care time    DKA with severe metabolic acidosis and altered mentation as well as " intensivist consultation and discussion with patient's neighbor.  Due to a high probability of clinically significant, life threatening deterioration, the patient required my highest level of preparedness to intervene emergently and I personally spent this critical care time directly and personally managing the patient. This critical care time included obtaining a history; examining the patient; pulse oximetry; ordering and review of studies; arranging urgent treatment with development of a management plan; evaluation of patient's response to treatment; frequent reassessment; and, discussions with other providers.  This critical care time was performed to assess and manage the high probability of imminent, life-threatening deterioration that could result in multi-organ failure. It was exclusive of separately billable procedures and treating other patients and teaching time.  Please see MDM section and the rest of the note for further information on patient assessment and treatment.    Diagnoses as of 10/21/23 0013   DKA, type 2, not at goal (CMS/HCC)   Altered mental status, unspecified altered mental status type   Metabolic acidosis       Patient was counseled regarding labs, imaging, likely diagnosis, and plan. All questions were answered.     ------------------------------------------------------------------  Information provided by EMS and the neighbor  Consults intensivist  Due to social and economic determinants elderly lives home alone  Past medical history complicating workup uncontrolled diabetes  Previous medical records reviewed previous outpatient visits to primary care physician in 2019.  ------------------------------------------------------------------  ED Medications administered this visit: IV insulin drip, IV bicarbonate drip at 100 cc/h, 3 L lactated ringer bolus     Final Impression:   1. DKA, type 2, not at goal (CMS/HCC)    Metabolic acidosis  .  Mental status    Anthony Johns,  DO    (Please note that portions of this note were completed with a voice recognition program.  Efforts were made to edit the dictations but occasionally words are mis-transcribed.)     Anthony Johns,   10/21/23 0011       Anthony Johns,   10/21/23 0013

## 2023-10-19 NOTE — H&P
History Of Present Illness  Ivelisse Paulino is a 63 y.o. female with a past medical history of type II DM, HTN, hypothyroid, multiple pulmonary nodules, memory problems, and SLE, presents to hospital with sudden onset abdominal pain, chest discomfort and confusion.  Patient was brought in by her friend after calling 911.  Patient usually lives alone at home.  Much of history is was obtained by friend at bedside.  She has no designated next of kin or POA.  Her friend stated that she does not usually take her insulin regularly.  She takes metformin however may not take it regularly.  Patient does not recall the last time she took.  She denies any vomiting, shortness of breath, headache, blurry vision, diarrhea, constipation, or palpitations.  There is no record of any medications that the patient takes at home.    On arrival to the ED, patient's vital signs include a heart rate of 110, /57, 100% oxygen saturation, RR 20, afebrile.  Blood sugars were found to be elevated at 964.  Bicarbonate was 5.  Beta hydroxybutyrate 12.7.  pH 6.9.  PCO2 19.  Bicarb 2.9.  PO2 44.  Patient was found to be in diabetic ketoacidosis.  She was started on IV fluids and IV insulin drip.  She was also given IV sodium bicarb.  Sodium 128.  Potassium 5.  Creatinine 1.7.  BUN 36.  Hb 11.4.  .  WCC 15.  Neutrophil 11.8.  Patient is being admitted to the ICU for further medical management of DKA.     Past Medical History  Past Medical History:   Diagnosis Date    Encounter for screening for malignant neoplasm of colon 09/17/2018    Screening for colon cancer    Essential (primary) hypertension 02/11/2019    HTN (hypertension), benign    Personal history of other diseases of the digestive system     History of esophageal reflux    Personal history of other diseases of the nervous system and sense organs     History of cluster headache    Personal history of other diseases of the respiratory system     History of pleurisy    Personal  history of other diseases of urinary system     History of bladder problems    Personal history of other endocrine, nutritional and metabolic disease     History of hypothyroidism    Personal history of other endocrine, nutritional and metabolic disease 02/11/2019    History of type 2 diabetes mellitus    Personal history of other endocrine, nutritional and metabolic disease 02/11/2019    History of hypothyroidism    Personal history of other endocrine, nutritional and metabolic disease 05/13/2019    History of type 2 diabetes mellitus    Personal history of other endocrine, nutritional and metabolic disease 04/15/2019    History of type 2 diabetes mellitus    Personal history of other endocrine, nutritional and metabolic disease 01/09/2019    History of type 2 diabetes mellitus    Personal history of other endocrine, nutritional and metabolic disease 03/13/2018    History of hypothyroidism    Personal history of other medical treatment 03/01/2016    History of screening mammography    Systemic lupus erythematosus, unspecified (CMS/HCC)     History of systemic lupus erythematosus (SLE)       Surgical History  Past Surgical History:   Procedure Laterality Date    BREAST LUMPECTOMY  09/17/2018    Breast Surgery Lumpectomy    CARPAL TUNNEL RELEASE  09/17/2018    Neuroplasty Decompression Median Nerve At Carpal Tunnel    HAND TENDON SURGERY  09/17/2018    Hand Incision Tendon Sheath Of A Finger    OTHER SURGICAL HISTORY  09/17/2018    Surgery Of Esophagus        Social History  Patient is non-smoker.  Social drinker.  No illicit drug use  Lives alone  Closest to her.  Currently has no next of kin or POA  Ai Rebecca [phone]    Family History  Noncontributory     Allergies  Penicillins and Esomeprazole    Review of Systems  A 12 point review of systems was performed and otherwise negative except as stated in the HPI.        Physical Exam  Physical Exam:  General: Alert and orientated x3.  Patient in distress due to  "abdominal pain  HEENT:  Normocephalic, atraumatic, mucus membranes moist.  Smells of ketones on breath  Neck:  Trachea midline.  No JVD.    Chest:  Clear to auscultation bilaterally. No wheezes, rales, or rhonchi.  CV:  Regular rate and rhythm.  Positive S1/S2.   Abdomen: Soft, diffuse tenderness.  Bowel sounds present.  Extremities:  No lower extremity edema or cyanosis.   Neurological:  AAOx3. No focal deficits.  Skin:  Warm and dry.      Last Recorded Vitals  Blood pressure 100/57, pulse 101, temperature 36.8 °C (98.2 °F), temperature source Tympanic, resp. rate 16, height 1.702 m (5' 7\"), weight 63.5 kg (140 lb), SpO2 100 %.    Relevant Results      Results for orders placed or performed during the hospital encounter of 10/19/23 (from the past 24 hour(s))   CBC and Auto Differential   Result Value Ref Range    WBC 15.3 (H) 4.4 - 11.3 x10*3/uL    nRBC 0.0 0.0 - 0.0 /100 WBCs    RBC 3.33 (L) 4.00 - 5.20 x10*6/uL    Hemoglobin 11.4 (L) 12.0 - 16.0 g/dL    Hematocrit 39.1 36.0 - 46.0 %     (H) 80 - 100 fL    MCH 34.2 (H) 26.0 - 34.0 pg    MCHC 29.2 (L) 32.0 - 36.0 g/dL    RDW 13.4 11.5 - 14.5 %    Platelets 319 150 - 450 x10*3/uL    MPV 10.8 7.5 - 11.5 fL    Immature Granulocytes %, Automated 2.0 (H) 0.0 - 0.9 %    Immature Granulocytes Absolute, Automated 0.31 0.00 - 0.70 x10*3/uL   Comprehensive metabolic panel   Result Value Ref Range    Glucose 964 (HH) 74 - 99 mg/dL    Sodium 128 (L) 136 - 145 mmol/L    Potassium 5.0 3.5 - 5.3 mmol/L    Chloride 92 (L) 98 - 107 mmol/L    Bicarbonate 5 (LL) 21 - 32 mmol/L    Anion Gap 36 (H) 10 - 20 mmol/L    Urea Nitrogen 36 (H) 6 - 23 mg/dL    Creatinine 1.77 (H) 0.50 - 1.05 mg/dL    eGFR 32 (L) >60 mL/min/1.73m*2    Calcium 10.7 (H) 8.6 - 10.3 mg/dL    Albumin 4.1 3.4 - 5.0 g/dL    Alkaline Phosphatase 104 33 - 136 U/L    Total Protein 7.5 6.4 - 8.2 g/dL    AST 18 9 - 39 U/L    Bilirubin, Total 0.3 0.0 - 1.2 mg/dL    ALT 18 7 - 45 U/L   Troponin I, High Sensitivity "   Result Value Ref Range    Troponin I, High Sensitivity 11 0 - 13 ng/L   Magnesium   Result Value Ref Range    Magnesium 2.60 (H) 1.60 - 2.40 mg/dL   Manual Differential   Result Value Ref Range    Neutrophils %, Manual 77.0 40.0 - 80.0 %    Bands %, Manual 5.0 0.0 - 5.0 %    Lymphocytes %, Manual 5.0 13.0 - 44.0 %    Monocytes %, Manual 10.0 2.0 - 10.0 %    Eosinophils %, Manual 0.0 0.0 - 6.0 %    Basophils %, Manual 1.0 0.0 - 2.0 %    Metamyelocytes %, Manual 2.0 0.0 - 0.0 %    Seg Neutrophils Absolute, Manual 11.78 (H) 1.20 - 7.00 x10*3/uL    Bands Absolute, Manual 0.77 (H) 0.00 - 0.70 x10*3/uL    Lymphocytes Absolute, Manual 0.77 (L) 1.20 - 4.80 x10*3/uL    Monocytes Absolute, Manual 1.53 (H) 0.10 - 1.00 x10*3/uL    Eosinophils Absolute, Manual 0.00 0.00 - 0.70 x10*3/uL    Basophils Absolute, Manual 0.15 (H) 0.00 - 0.10 x10*3/uL    Metamyelocytes Absolute, Manual 0.31 0.00 - 0.00 x10*3/uL    Total Cells Counted 100     Neutrophils Absolute, Manual 12.55 (H) 1.20 - 7.70 x10*3/uL    RBC Morphology See Below     Ovalocytes Few     [place] Cells Few    Pathologist Review-CBC Differential   Result Value Ref Range    Pathologist Review-CBC Differential High MCV    Beta Hydroxybutyrate   Result Value Ref Range    Beta-Hydroxybutyrate 12.72 (H) 0.02 - 0.27 mmol/L   Urinalysis with Reflex Microscopic and Culture   Result Value Ref Range    Color, Urine Yellow Straw, Yellow    Appearance, Urine Clear Clear    Specific Gravity, Urine 1.015 1.005 - 1.035    pH, Urine 5.0 5.0, 5.5, 6.0, 6.5, 7.0, 7.5, 8.0    Protein, Urine 30 (1+) (N) NEGATIVE mg/dL    Glucose, Urine >=500 (3+) (A) NEGATIVE mg/dL    Blood, Urine SMALL (1+) (A) NEGATIVE    Ketones, Urine 80 (2+) (A) NEGATIVE mg/dL    Bilirubin, Urine NEGATIVE NEGATIVE    Urobilinogen, Urine <2.0 <2.0 mg/dL    Nitrite, Urine NEGATIVE NEGATIVE    Leukocyte Esterase, Urine TRACE (A) NEGATIVE   Microscopic Only, Urine   Result Value Ref Range    WBC, Urine 1-5 1-5, NONE /HPF     RBC, Urine NONE NONE, 1-2, 3-5 /HPF    Bacteria, Urine 1+ (A) NONE SEEN /HPF    Mucus, Urine 1+ Reference range not established. /LPF   Blood Gas Venous Unsolicited   Result Value Ref Range    POCT pH, Venous 6.92 (LL) 7.33 - 7.43 pH    POCT pCO2, Venous 19 (L) 41 - 51 mm Hg    POCT pO2, Venous 44 35 - 45 mm Hg    POCT SO2, Venous 72 45 - 75 %    POCT Oxy Hemoglobin, Venous 71.0 45.0 - 75.0 %    POCT Base Excess, Venous -27.5 (L) -2.0 - 3.0 mmol/L    POCT HCO3 Calculated, Venous 3.9 (L) 22.0 - 26.0 mmol/L    Patient Temperature 37.0 degrees Celsius    FiO2 21 %    Test Comment ICU     Critical Called By LOPEZ RODRIGUEZ ART     Critical Called To EDWINA RUDD DO     Critical Call Time 1602.0000     Critical Read Back Y    Light Blue Top   Result Value Ref Range    Extra Tube Hold for add-ons.    SST TOP   Result Value Ref Range    Extra Tube Hold for add-ons.     XR chest 1 view    Result Date: 10/19/2023  Interpreted By:  Sampson Tubbs, STUDY: XR CHEST 1 VIEW;  10/19/2023 3:51 pm   INDICATION: Signs/Symptoms:ams.   COMPARISON: CT abdomen and pelvis dated 10/19/2020 through full CT scan chest in 10/1923. Chest x-ray from 04/07/2016.   ACCESSION NUMBER(S): [id number]   ORDERING CLINICIAN: EDWINA RUDD   TECHNIQUE: Single AP portable view of the chest was obtained.   FINDINGS: MEDIASTINUM/ LUNGS/ CARLOS: Trace stable pleural scarring at the lateral right base. No cardiomegaly, vascular congestion, or pleural effusion. No abnormal opacity in either lung worrisome for tumor or pneumonia. No pneumothorax. No tracheal deviation. No abnormal hilar fullness or gross mass on either side.   BONES: No lytic or blastic destructive bone lesion.   UPPER ABDOMEN: Grossly intact.       Trace stable pleural scarring at the lateral right base. Otherwise, negative exam.   Signed by: Sampson Tubbs 10/19/2023 4:23 PM Dictation workstation:   [id number]    CT angio chest abdomen pelvis    Result Date: 10/19/2023  Interpreted By:   Jude Calhoun, STUDY: CT ANGIO CHEST ABDOMEN PELVIS;  10/19/2023 3:24 pm   INDICATION: Signs/Symptoms:AMS CP   COMPARISON: None.   ACCESSION NUMBER(S): [id number]   ORDERING CLINICIAN: ANTHONY RUDD   TECHNIQUE: CT of the chest, abdomen, and pelvis was performed. Contiguous axial images were obtained at  5 mm slice thickness through the chest, and at  3 mm through the abdomen and pelvis. Coronal and sagittal MIP reconstructions were obtained and reviewed.. 80 cc Omnipaque 350 injected intravenously.   FINDINGS: CHEST:   No filling defects in the pulmonary arteries suggest pulmonary embolism. Intact thoracic aorta with no aneurysm or dissection. Trace pericardial fluid. No pleural effusion. No adenopathy. No pneumothorax. No pulmonary consolidation, mass or suspicious nodule. No acute osseous abnormality in the thoracic cage. Bone island in T3 vertebral body.   ABDOMEN AND PELVIS:   Vascular calcification is noted. No aneurysm or dissection in the abdominal aorta and its major branches. Diffuse hepatic steatosis. Unremarkable spleen, pancreas, adrenals, and bilateral kidneys. Mucosal hyperenhancement of stomach and duodenal is seen, suspicious for gastroduodenitis. Bowel loops nonobstructed. Normal appendix. No free air or free fluid. No adenopathy. Sections through the pelvis demonstrate a grossly unremarkable urinary bladder. No adnexal mass.       No evidence of pulmonary embolism.   No thoracoabdominal aortic aneurysm or dissection.   No pneumonia.   Marked diffuse hepatic steatosis.   Questionable gastroduodenitis.   No evidence of bowel obstruction.   Normal appendix.   MACRO: None   Signed by: Jude Calhoun 10/19/2023 4:11 PM Dictation workstation:   [id number]    CT head wo IV contrast    Result Date: 10/19/2023  Interpreted By:  Anthony Reyes, STUDY: CT HEAD WO IV CONTRAST;  10/19/2023 3:23 pm   INDICATION: ams.   COMPARISON: None.   ACCESSION NUMBER(S): [id number]   ORDERING CLINICIAN:  EDWINA RUDD   TECHNIQUE: Contiguous unenhanced axial CT sections are performed from the skull base to the vertex.   FINDINGS: The osseous structures are intact. The visualized portions of the paranasal sinuses and mastoid air cells are clear.   The cortical sulci and CSF spaces are symmetric in appearance. There is no sign of parenchymal hematoma or dense extra-axial fluid collection. There is no edema or mass effect. The gray matter/white-matter differentiation is preserved.       No CT evidence of acute intracranial abnormality.   Signed by: Edwina Reyes 10/19/2023 3:32 PM Dictation workstation:   [id number]  Scheduled medications  lactated Ringer's, 2,000 mL, intravenous, Once      Continuous medications  insulin regular, 6 Units/hr, Last Rate: 6 Units/hr (10/19/23 1650)  lactated Ringer's, 200 mL/hr      PRN medications  PRN medications: insulin regular          Assessment/Plan   Principal Problem:    DKA, type 2, not at goal (CMS/HCC)  Assessment + Plan:   Ivelisse Paulino is a 63-year-old female with a past medical history of type II DM presenting with DKA episode.  Patient was found to have some abdominal pain and nausea along with chest discomfort.  Patient has sustained ketoacidosis with elevated ketones and elevated anion gap with elevated blood sugars.  Patient is being admitted to ICU for further medical management    Neuro:  #Acute metabolic encephalopathy in setting of DKA  - DKA protocol.  Ensure resolution of DKA.  Mental status improving with resolution of DKA  Alert and oriented x3  - Monitor vitals  - Maintain sleep hygiene    CV:  #HTN  #HLD  - Patient not on any antihypertensives  - Continue to monitor.  May start her on medications during this admission    Respiratory:  -No acute respiratory distress.  Patient does not require any oxygen    GI:  - N.p.o. while patient is being treated for DKA  - Once patient can eat and drink, can then discontinue D5W  -Protonix for stress ulcer  prophylaxis    Endo:  #DKA  #Type II DM  - IV insulin to be given to resolve the hypoglycemia  - IV fluids: Around 6 L should be given for adequate hydration  - Once anion gap is closed, transition to subcutaneous insulin and overlap with IV insulin for 2 hours then discontinue IV insulin  - Subcutaneous insulin regimen code 16 units bolus Lantus and 6 units lispro to be given at mealtime once anion gap is closed.  Ensure there is overlap with IV insulin for 2 hours, then discontinue IV insulin  - Once sugars reach less than 250, transition to D5W  - Once patient is able to eat and drink, can discontinue D5W  -Monitor BMP every 4 hours.  Check sugars every hour.  Ensure resolution of anion gap.  - Patient does not regularly take insulin.  Unsure if patient has insulin prescribed.  Unsure if patient has endocrinologist.  Patient takes metformin however not regulary  - Consult endocrinology for further Arash    Renal:  #KLEBER  #Hyponatremia  - Likely in the setting of DKA, dehydration  - IV fluids will correct hyperkalemia and correct KLEBER  - Monitor BMP and optimize electrolytes  - Monitor potassium while DKA is being treated.  If potassium less than 3.5 replace as appropriate  - Hyponatremia likely in the setting of dehydration.  Continue to monitor sodium    Hematological:  #Leukocytosis  #Neutrophilia  #Macrocytic anemia  - No indication of any infection.  Patient afebrile  - Monitor CBC    ID:  #Leukocytosis  #Neutrophilia  - No indication of any infectious source..  Patient afebrile.  No antibiotics needed    Vent/O2: None  Lines/Devices: IV peripheral  Drips: Fluids IV insulin  ATBx: None  Fluids: IV fluids LR  Diet: N.p.o.  PPX: Protonix  Code status: Full code  Dispo: ICU        Albert Cortes MD  PGY1 Internal Medicine

## 2023-10-20 ENCOUNTER — APPOINTMENT (OUTPATIENT)
Dept: CARDIOLOGY | Facility: HOSPITAL | Age: 63
DRG: 637 | End: 2023-10-20
Payer: COMMERCIAL

## 2023-10-20 LAB
AMPHETAMINES UR QL SCN: NORMAL
ANION GAP BLDV CALCULATED.4IONS-SCNC: 13 MMOL/L (ref 10–25)
ANION GAP SERPL CALC-SCNC: 10 MMOL/L (ref 10–20)
ANION GAP SERPL CALC-SCNC: 12 MMOL/L (ref 10–20)
ANION GAP SERPL CALC-SCNC: 18 MMOL/L (ref 10–20)
ANION GAP SERPL CALC-SCNC: 9 MMOL/L (ref 10–20)
APAP SERPL-MCNC: <10 UG/ML
BACTERIA UR CULT: NO GROWTH
BARBITURATES UR QL SCN: NORMAL
BASE EXCESS BLDV CALC-SCNC: -2.3 MMOL/L (ref -2–3)
BASE EXCESS BLDV CALC-SCNC: -27.5 MMOL/L (ref -2–3)
BENZODIAZ UR QL SCN: NORMAL
BODY TEMPERATURE: 37 DEGREES CELSIUS
BODY TEMPERATURE: 37 DEGREES CELSIUS
BUN SERPL-MCNC: 26 MG/DL (ref 6–23)
BUN SERPL-MCNC: 31 MG/DL (ref 6–23)
BUN SERPL-MCNC: 34 MG/DL (ref 6–23)
BUN SERPL-MCNC: 38 MG/DL (ref 6–23)
BZE UR QL SCN: NORMAL
CA-I BLDV-SCNC: 1.49 MMOL/L (ref 1.1–1.33)
CALCIUM SERPL-MCNC: 10.2 MG/DL (ref 8.6–10.3)
CALCIUM SERPL-MCNC: 10.2 MG/DL (ref 8.6–10.3)
CALCIUM SERPL-MCNC: 10.3 MG/DL (ref 8.6–10.3)
CALCIUM SERPL-MCNC: 9.6 MG/DL (ref 8.6–10.3)
CANNABINOIDS UR QL SCN: NORMAL
CHLORIDE BLDV-SCNC: 105 MMOL/L (ref 98–107)
CHLORIDE SERPL-SCNC: 104 MMOL/L (ref 98–107)
CHLORIDE SERPL-SCNC: 106 MMOL/L (ref 98–107)
CHLORIDE SERPL-SCNC: 108 MMOL/L (ref 98–107)
CHLORIDE SERPL-SCNC: 110 MMOL/L (ref 98–107)
CO2 SERPL-SCNC: 18 MMOL/L (ref 21–32)
CO2 SERPL-SCNC: 24 MMOL/L (ref 21–32)
CORTIS AM PEAK SERPL-MSCNC: 36.3 UG/DL (ref 5–20)
CREAT SERPL-MCNC: 1.14 MG/DL (ref 0.5–1.05)
CREAT SERPL-MCNC: 1.48 MG/DL (ref 0.5–1.05)
CREAT SERPL-MCNC: 1.53 MG/DL (ref 0.5–1.05)
CREAT SERPL-MCNC: 1.57 MG/DL (ref 0.5–1.05)
CREAT UR-MCNC: 59.4 MG/DL (ref 20–320)
CRITICAL CALL TIME: 1602
CRITICAL CALL TIME: 258
CRITICAL CALLED BY: ABNORMAL
CRITICAL CALLED BY: ABNORMAL
CRITICAL CALLED TO: ABNORMAL
CRITICAL CALLED TO: ABNORMAL
CRITICAL READ BACK: ABNORMAL
CRITICAL READ BACK: ABNORMAL
CRP SERPL-MCNC: 15.52 MG/DL
EJECTION FRACTION APICAL 4 CHAMBER: 68.6
ERYTHROCYTE [DISTWIDTH] IN BLOOD BY AUTOMATED COUNT: 12.9 % (ref 11.5–14.5)
ERYTHROCYTE [SEDIMENTATION RATE] IN BLOOD BY WESTERGREN METHOD: 21 MM/H (ref 0–30)
ERYTHROCYTE [SEDIMENTATION RATE] IN BLOOD BY WESTERGREN METHOD: 94 MM/H (ref 0–30)
EST. AVERAGE GLUCOSE BLD GHB EST-MCNC: 392 MG/DL
ETHANOL SERPL-MCNC: <10 MG/DL
FENTANYL+NORFENTANYL UR QL SCN: NORMAL
GFR SERPL CREATININE-BSD FRML MDRD: 37 ML/MIN/1.73M*2
GFR SERPL CREATININE-BSD FRML MDRD: 38 ML/MIN/1.73M*2
GFR SERPL CREATININE-BSD FRML MDRD: 40 ML/MIN/1.73M*2
GFR SERPL CREATININE-BSD FRML MDRD: 54 ML/MIN/1.73M*2
GLUCOSE BLD MANUAL STRIP-MCNC: 122 MG/DL (ref 74–99)
GLUCOSE BLD MANUAL STRIP-MCNC: 127 MG/DL (ref 74–99)
GLUCOSE BLD MANUAL STRIP-MCNC: 172 MG/DL (ref 74–99)
GLUCOSE BLD MANUAL STRIP-MCNC: 187 MG/DL (ref 74–99)
GLUCOSE BLD MANUAL STRIP-MCNC: 218 MG/DL (ref 74–99)
GLUCOSE BLD MANUAL STRIP-MCNC: 233 MG/DL (ref 74–99)
GLUCOSE BLD MANUAL STRIP-MCNC: 279 MG/DL (ref 74–99)
GLUCOSE BLD MANUAL STRIP-MCNC: 337 MG/DL (ref 74–99)
GLUCOSE BLD MANUAL STRIP-MCNC: 419 MG/DL (ref 74–99)
GLUCOSE BLD MANUAL STRIP-MCNC: 481 MG/DL (ref 74–99)
GLUCOSE BLD MANUAL STRIP-MCNC: 501 MG/DL (ref 74–99)
GLUCOSE BLD MANUAL STRIP-MCNC: 71 MG/DL (ref 74–99)
GLUCOSE BLDV-MCNC: 487 MG/DL (ref 74–99)
GLUCOSE SERPL-MCNC: 141 MG/DL (ref 74–99)
GLUCOSE SERPL-MCNC: 231 MG/DL (ref 74–99)
GLUCOSE SERPL-MCNC: 274 MG/DL (ref 74–99)
GLUCOSE SERPL-MCNC: 559 MG/DL (ref 74–99)
HBA1C MFR BLD: 15.3 %
HCO3 BLDV-SCNC: 22.5 MMOL/L (ref 22–26)
HCO3 BLDV-SCNC: 3.9 MMOL/L (ref 22–26)
HCT VFR BLD AUTO: 32.9 % (ref 36–46)
HCT VFR BLD EST: 36 % (ref 36–46)
HGB BLD-MCNC: 10.8 G/DL (ref 12–16)
HGB BLDV-MCNC: 11.9 G/DL (ref 12–16)
INHALED O2 CONCENTRATION: 21 %
INHALED O2 CONCENTRATION: 21 %
LACTATE BLDV-SCNC: 1.7 MMOL/L (ref 0.4–2)
LACTATE SERPL-SCNC: 1.4 MMOL/L (ref 0.4–2)
MAGNESIUM SERPL-MCNC: 1.96 MG/DL (ref 1.6–2.4)
MCH RBC QN AUTO: 33.5 PG (ref 26–34)
MCHC RBC AUTO-ENTMCNC: 32.8 G/DL (ref 32–36)
MCV RBC AUTO: 102 FL (ref 80–100)
MICROALBUMIN UR-MCNC: 67.6 MG/L
MICROALBUMIN/CREAT UR: 113.8 UG/MG CREAT
NRBC BLD-RTO: 0 /100 WBCS (ref 0–0)
OPIATES UR QL SCN: NORMAL
OXYCODONE+OXYMORPHONE UR QL SCN: NORMAL
OXYHGB MFR BLDV: 71 % (ref 45–75)
OXYHGB MFR BLDV: 84.5 % (ref 45–75)
PCO2 BLDV: 19 MM HG (ref 41–51)
PCO2 BLDV: 38 MM HG (ref 41–51)
PCP UR QL SCN: NORMAL
PH BLDV: 6.92 PH (ref 7.33–7.43)
PH BLDV: 7.38 PH (ref 7.33–7.43)
PLATELET # BLD AUTO: 227 X10*3/UL (ref 150–450)
PMV BLD AUTO: 9.9 FL (ref 7.5–11.5)
PO2 BLDV: 44 MM HG (ref 35–45)
PO2 BLDV: 47 MM HG (ref 35–45)
POTASSIUM BLDV-SCNC: 3.6 MMOL/L (ref 3.5–5.3)
POTASSIUM SERPL-SCNC: 3.5 MMOL/L (ref 3.5–5.3)
POTASSIUM SERPL-SCNC: 3.6 MMOL/L (ref 3.5–5.3)
POTASSIUM SERPL-SCNC: 3.6 MMOL/L (ref 3.5–5.3)
POTASSIUM SERPL-SCNC: 3.7 MMOL/L (ref 3.5–5.3)
RBC # BLD AUTO: 3.22 X10*6/UL (ref 4–5.2)
SALICYLATES SERPL-MCNC: <3 MG/DL
SAO2 % BLDV: 72 % (ref 45–75)
SAO2 % BLDV: 86 % (ref 45–75)
SODIUM BLDV-SCNC: 137 MMOL/L (ref 136–145)
SODIUM SERPL-SCNC: 135 MMOL/L (ref 136–145)
SODIUM SERPL-SCNC: 136 MMOL/L (ref 136–145)
SODIUM SERPL-SCNC: 140 MMOL/L (ref 136–145)
SODIUM SERPL-SCNC: 140 MMOL/L (ref 136–145)
TEST COMMENT: ABNORMAL
TSH SERPL-ACNC: 4.78 MIU/L (ref 0.44–3.98)
WBC # BLD AUTO: 13.2 X10*3/UL (ref 4.4–11.3)

## 2023-10-20 PROCEDURE — 2500000002 HC RX 250 W HCPCS SELF ADMINISTERED DRUGS (ALT 637 FOR MEDICARE OP, ALT 636 FOR OP/ED): Performed by: STUDENT IN AN ORGANIZED HEALTH CARE EDUCATION/TRAINING PROGRAM

## 2023-10-20 PROCEDURE — 96372 THER/PROPH/DIAG INJ SC/IM: CPT | Performed by: STUDENT IN AN ORGANIZED HEALTH CARE EDUCATION/TRAINING PROGRAM

## 2023-10-20 PROCEDURE — 2500000004 HC RX 250 GENERAL PHARMACY W/ HCPCS (ALT 636 FOR OP/ED): Performed by: STUDENT IN AN ORGANIZED HEALTH CARE EDUCATION/TRAINING PROGRAM

## 2023-10-20 PROCEDURE — 36415 COLL VENOUS BLD VENIPUNCTURE: CPT | Mod: PARLAB | Performed by: STUDENT IN AN ORGANIZED HEALTH CARE EDUCATION/TRAINING PROGRAM

## 2023-10-20 PROCEDURE — 36415 COLL VENOUS BLD VENIPUNCTURE: CPT

## 2023-10-20 PROCEDURE — 97162 PT EVAL MOD COMPLEX 30 MIN: CPT | Mod: GP

## 2023-10-20 PROCEDURE — 2500000002 HC RX 250 W HCPCS SELF ADMINISTERED DRUGS (ALT 637 FOR MEDICARE OP, ALT 636 FOR OP/ED)

## 2023-10-20 PROCEDURE — 80048 BASIC METABOLIC PNL TOTAL CA: CPT | Performed by: STUDENT IN AN ORGANIZED HEALTH CARE EDUCATION/TRAINING PROGRAM

## 2023-10-20 PROCEDURE — 84132 ASSAY OF SERUM POTASSIUM: CPT

## 2023-10-20 PROCEDURE — 80307 DRUG TEST PRSMV CHEM ANLYZR: CPT

## 2023-10-20 PROCEDURE — 83036 HEMOGLOBIN GLYCOSYLATED A1C: CPT

## 2023-10-20 PROCEDURE — 2500000004 HC RX 250 GENERAL PHARMACY W/ HCPCS (ALT 636 FOR OP/ED)

## 2023-10-20 PROCEDURE — 36415 COLL VENOUS BLD VENIPUNCTURE: CPT | Performed by: STUDENT IN AN ORGANIZED HEALTH CARE EDUCATION/TRAINING PROGRAM

## 2023-10-20 PROCEDURE — 82947 ASSAY GLUCOSE BLOOD QUANT: CPT

## 2023-10-20 PROCEDURE — 85652 RBC SED RATE AUTOMATED: CPT | Performed by: STUDENT IN AN ORGANIZED HEALTH CARE EDUCATION/TRAINING PROGRAM

## 2023-10-20 PROCEDURE — 80320 DRUG SCREEN QUANTALCOHOLS: CPT

## 2023-10-20 PROCEDURE — 96372 THER/PROPH/DIAG INJ SC/IM: CPT

## 2023-10-20 PROCEDURE — 84145 PROCALCITONIN (PCT): CPT | Mod: CMCLAB,PARLAB

## 2023-10-20 PROCEDURE — 86140 C-REACTIVE PROTEIN: CPT | Performed by: STUDENT IN AN ORGANIZED HEALTH CARE EDUCATION/TRAINING PROGRAM

## 2023-10-20 PROCEDURE — 83735 ASSAY OF MAGNESIUM: CPT | Performed by: STUDENT IN AN ORGANIZED HEALTH CARE EDUCATION/TRAINING PROGRAM

## 2023-10-20 PROCEDURE — 93306 TTE W/DOPPLER COMPLETE: CPT | Performed by: INTERNAL MEDICINE

## 2023-10-20 PROCEDURE — 85652 RBC SED RATE AUTOMATED: CPT

## 2023-10-20 PROCEDURE — 93356 MYOCRD STRAIN IMG SPCKL TRCK: CPT | Performed by: INTERNAL MEDICINE

## 2023-10-20 PROCEDURE — 85027 COMPLETE CBC AUTOMATED: CPT

## 2023-10-20 PROCEDURE — 82043 UR ALBUMIN QUANTITATIVE: CPT | Performed by: STUDENT IN AN ORGANIZED HEALTH CARE EDUCATION/TRAINING PROGRAM

## 2023-10-20 PROCEDURE — 2500000001 HC RX 250 WO HCPCS SELF ADMINISTERED DRUGS (ALT 637 FOR MEDICARE OP): Performed by: STUDENT IN AN ORGANIZED HEALTH CARE EDUCATION/TRAINING PROGRAM

## 2023-10-20 PROCEDURE — 82533 TOTAL CORTISOL: CPT | Mod: CMCLAB,PARLAB | Performed by: STUDENT IN AN ORGANIZED HEALTH CARE EDUCATION/TRAINING PROGRAM

## 2023-10-20 PROCEDURE — 84145 PROCALCITONIN (PCT): CPT | Mod: CMCLAB,PARLAB | Performed by: STUDENT IN AN ORGANIZED HEALTH CARE EDUCATION/TRAINING PROGRAM

## 2023-10-20 PROCEDURE — 2500000005 HC RX 250 GENERAL PHARMACY W/O HCPCS: Performed by: STUDENT IN AN ORGANIZED HEALTH CARE EDUCATION/TRAINING PROGRAM

## 2023-10-20 PROCEDURE — 97166 OT EVAL MOD COMPLEX 45 MIN: CPT | Mod: GO

## 2023-10-20 PROCEDURE — C9113 INJ PANTOPRAZOLE SODIUM, VIA: HCPCS

## 2023-10-20 PROCEDURE — 83605 ASSAY OF LACTIC ACID: CPT | Performed by: STUDENT IN AN ORGANIZED HEALTH CARE EDUCATION/TRAINING PROGRAM

## 2023-10-20 PROCEDURE — 84132 ASSAY OF SERUM POTASSIUM: CPT | Performed by: STUDENT IN AN ORGANIZED HEALTH CARE EDUCATION/TRAINING PROGRAM

## 2023-10-20 PROCEDURE — 80048 BASIC METABOLIC PNL TOTAL CA: CPT

## 2023-10-20 PROCEDURE — 2020000001 HC ICU ROOM DAILY

## 2023-10-20 PROCEDURE — 84443 ASSAY THYROID STIM HORMONE: CPT | Performed by: INTERNAL MEDICINE

## 2023-10-20 PROCEDURE — 93356 MYOCRD STRAIN IMG SPCKL TRCK: CPT

## 2023-10-20 RX ORDER — DEXTROSE 50 % IN WATER (D50W) INTRAVENOUS SYRINGE
25
Status: DISCONTINUED | OUTPATIENT
Start: 2023-10-20 | End: 2023-10-25 | Stop reason: HOSPADM

## 2023-10-20 RX ORDER — INSULIN LISPRO 100 [IU]/ML
4 INJECTION, SOLUTION INTRAVENOUS; SUBCUTANEOUS
Status: DISCONTINUED | OUTPATIENT
Start: 2023-10-20 | End: 2023-10-21

## 2023-10-20 RX ORDER — DEXTROSE, SODIUM CHLORIDE, SODIUM LACTATE, POTASSIUM CHLORIDE, AND CALCIUM CHLORIDE 5; .6; .31; .03; .02 G/100ML; G/100ML; G/100ML; G/100ML; G/100ML
125 INJECTION, SOLUTION INTRAVENOUS CONTINUOUS
Status: DISCONTINUED | OUTPATIENT
Start: 2023-10-20 | End: 2023-10-20

## 2023-10-20 RX ORDER — MEROPENEM 1 G/1
1 INJECTION, POWDER, FOR SOLUTION INTRAVENOUS EVERY 12 HOURS
Status: DISCONTINUED | OUTPATIENT
Start: 2023-10-20 | End: 2023-10-24

## 2023-10-20 RX ORDER — VANCOMYCIN HYDROCHLORIDE 750 MG/150ML
750 INJECTION, SOLUTION INTRAVENOUS EVERY 24 HOURS
Status: DISCONTINUED | OUTPATIENT
Start: 2023-10-20 | End: 2023-10-21

## 2023-10-20 RX ORDER — SODIUM CHLORIDE, SODIUM LACTATE, POTASSIUM CHLORIDE, CALCIUM CHLORIDE 600; 310; 30; 20 MG/100ML; MG/100ML; MG/100ML; MG/100ML
100 INJECTION, SOLUTION INTRAVENOUS CONTINUOUS
Status: DISCONTINUED | OUTPATIENT
Start: 2023-10-20 | End: 2023-10-21

## 2023-10-20 RX ORDER — NOREPINEPHRINE BITARTRATE 0.03 MG/ML
.01-.5 INJECTION, SOLUTION INTRAVENOUS CONTINUOUS
Status: DISCONTINUED | OUTPATIENT
Start: 2023-10-20 | End: 2023-10-25 | Stop reason: HOSPADM

## 2023-10-20 RX ORDER — ACETAMINOPHEN 325 MG/1
650 TABLET ORAL EVERY 4 HOURS PRN
Status: DISCONTINUED | OUTPATIENT
Start: 2023-10-20 | End: 2023-10-25 | Stop reason: HOSPADM

## 2023-10-20 RX ORDER — INSULIN GLARGINE 100 [IU]/ML
12 INJECTION, SOLUTION SUBCUTANEOUS EVERY 24 HOURS
Status: DISCONTINUED | OUTPATIENT
Start: 2023-10-20 | End: 2023-10-24

## 2023-10-20 RX ORDER — AZTREONAM 1 G/1
1 INJECTION, POWDER, LYOPHILIZED, FOR SOLUTION INTRAMUSCULAR; INTRAVENOUS EVERY 12 HOURS
Status: DISCONTINUED | OUTPATIENT
Start: 2023-10-20 | End: 2023-10-20

## 2023-10-20 RX ORDER — DEXTROSE MONOHYDRATE 100 MG/ML
0.3 INJECTION, SOLUTION INTRAVENOUS ONCE AS NEEDED
Status: DISCONTINUED | OUTPATIENT
Start: 2023-10-20 | End: 2023-10-25 | Stop reason: HOSPADM

## 2023-10-20 RX ORDER — INSULIN LISPRO 100 [IU]/ML
0-20 INJECTION, SOLUTION INTRAVENOUS; SUBCUTANEOUS
Status: DISCONTINUED | OUTPATIENT
Start: 2023-10-20 | End: 2023-10-23

## 2023-10-20 RX ORDER — INSULIN LISPRO 100 [IU]/ML
0-20 INJECTION, SOLUTION INTRAVENOUS; SUBCUTANEOUS
Status: DISCONTINUED | OUTPATIENT
Start: 2023-10-20 | End: 2023-10-20

## 2023-10-20 RX ORDER — VANCOMYCIN HYDROCHLORIDE 750 MG/150ML
15 INJECTION, SOLUTION INTRAVENOUS EVERY 12 HOURS
Status: DISCONTINUED | OUTPATIENT
Start: 2023-10-20 | End: 2023-10-20

## 2023-10-20 RX ORDER — ACETAMINOPHEN 160 MG/5ML
650 SOLUTION ORAL EVERY 4 HOURS PRN
Status: DISCONTINUED | OUTPATIENT
Start: 2023-10-20 | End: 2023-10-25 | Stop reason: HOSPADM

## 2023-10-20 RX ADMIN — HEPARIN SODIUM 5000 UNITS: 5000 INJECTION INTRAVENOUS; SUBCUTANEOUS at 15:32

## 2023-10-20 RX ADMIN — ACETAMINOPHEN 650 MG: 160 SOLUTION ORAL at 16:14

## 2023-10-20 RX ADMIN — INSULIN LISPRO 4 UNITS: 100 INJECTION, SOLUTION INTRAVENOUS; SUBCUTANEOUS at 09:22

## 2023-10-20 RX ADMIN — SODIUM CHLORIDE, SODIUM LACTATE, POTASSIUM CHLORIDE, CALCIUM CHLORIDE AND DEXTROSE MONOHYDRATE 125 ML/HR: 5; 600; 310; 30; 20 INJECTION, SOLUTION INTRAVENOUS at 06:16

## 2023-10-20 RX ADMIN — INSULIN HUMAN 10 UNITS/HR: 1 INJECTION, SOLUTION INTRAVENOUS at 01:50

## 2023-10-20 RX ADMIN — SODIUM CHLORIDE, POTASSIUM CHLORIDE, SODIUM LACTATE AND CALCIUM CHLORIDE 500 ML: 600; 310; 30; 20 INJECTION, SOLUTION INTRAVENOUS at 06:16

## 2023-10-20 RX ADMIN — INSULIN LISPRO 4 UNITS: 100 INJECTION, SOLUTION INTRAVENOUS; SUBCUTANEOUS at 17:45

## 2023-10-20 RX ADMIN — PANTOPRAZOLE SODIUM 40 MG: 40 INJECTION, POWDER, FOR SOLUTION INTRAVENOUS at 06:32

## 2023-10-20 RX ADMIN — MEROPENEM 1 G: 1 INJECTION, POWDER, FOR SOLUTION INTRAVENOUS at 11:07

## 2023-10-20 RX ADMIN — MEROPENEM 1 G: 1 INJECTION, POWDER, FOR SOLUTION INTRAVENOUS at 20:28

## 2023-10-20 RX ADMIN — VANCOMYCIN HYDROCHLORIDE 750 MG: 750 INJECTION, SOLUTION INTRAVENOUS at 20:37

## 2023-10-20 RX ADMIN — SODIUM CHLORIDE, POTASSIUM CHLORIDE, SODIUM LACTATE AND CALCIUM CHLORIDE 100 ML/HR: 600; 310; 30; 20 INJECTION, SOLUTION INTRAVENOUS at 12:10

## 2023-10-20 RX ADMIN — HEPARIN SODIUM 5000 UNITS: 5000 INJECTION INTRAVENOUS; SUBCUTANEOUS at 21:10

## 2023-10-20 RX ADMIN — INSULIN GLARGINE 12 UNITS: 100 INJECTION, SOLUTION SUBCUTANEOUS at 08:43

## 2023-10-20 RX ADMIN — ACETAMINOPHEN 650 MG: 160 SOLUTION ORAL at 20:27

## 2023-10-20 RX ADMIN — SODIUM CHLORIDE, POTASSIUM CHLORIDE, SODIUM LACTATE AND CALCIUM CHLORIDE 100 ML/HR: 600; 310; 30; 20 INJECTION, SOLUTION INTRAVENOUS at 21:45

## 2023-10-20 RX ADMIN — INSULIN LISPRO 8 UNITS: 100 INJECTION, SOLUTION INTRAVENOUS; SUBCUTANEOUS at 17:46

## 2023-10-20 RX ADMIN — Medication 0.01 MCG/KG/MIN: at 06:55

## 2023-10-20 RX ADMIN — HEPARIN SODIUM 5000 UNITS: 5000 INJECTION INTRAVENOUS; SUBCUTANEOUS at 06:15

## 2023-10-20 SDOH — SOCIAL STABILITY: SOCIAL INSECURITY: WERE YOU ABLE TO COMPLETE ALL THE BEHAVIORAL HEALTH SCREENINGS?: YES

## 2023-10-20 SDOH — SOCIAL STABILITY: SOCIAL INSECURITY: ARE YOU OR HAVE YOU BEEN THREATENED OR ABUSED PHYSICALLY, EMOTIONALLY, OR SEXUALLY BY ANYONE?: UNABLE TO ASSESS

## 2023-10-20 SDOH — SOCIAL STABILITY: SOCIAL INSECURITY: HAVE YOU HAD THOUGHTS OF HARMING ANYONE ELSE?: NO

## 2023-10-20 SDOH — SOCIAL STABILITY: SOCIAL INSECURITY: HAS ANYONE EVER THREATENED TO HURT YOUR FAMILY OR YOUR PETS?: UNABLE TO ASSESS

## 2023-10-20 SDOH — SOCIAL STABILITY: SOCIAL INSECURITY: ARE THERE ANY APPARENT SIGNS OF INJURIES/BEHAVIORS THAT COULD BE RELATED TO ABUSE/NEGLECT?: NO

## 2023-10-20 SDOH — SOCIAL STABILITY: SOCIAL INSECURITY: DO YOU FEEL ANYONE HAS EXPLOITED OR TAKEN ADVANTAGE OF YOU FINANCIALLY OR OF YOUR PERSONAL PROPERTY?: NO

## 2023-10-20 SDOH — SOCIAL STABILITY: SOCIAL INSECURITY: ABUSE: ADULT

## 2023-10-20 SDOH — SOCIAL STABILITY: SOCIAL INSECURITY: DO YOU FEEL UNSAFE GOING BACK TO THE PLACE WHERE YOU ARE LIVING?: NO

## 2023-10-20 SDOH — SOCIAL STABILITY: SOCIAL INSECURITY: DOES ANYONE TRY TO KEEP YOU FROM HAVING/CONTACTING OTHER FRIENDS OR DOING THINGS OUTSIDE YOUR HOME?: UNABLE TO ASSESS

## 2023-10-20 ASSESSMENT — LIFESTYLE VARIABLES
HOW OFTEN DO YOU HAVE A DRINK CONTAINING ALCOHOL: NEVER
AUDIT-C TOTAL SCORE: 0
HOW OFTEN DO YOU HAVE 6 OR MORE DRINKS ON ONE OCCASION: NEVER
PRESCIPTION_ABUSE_PAST_12_MONTHS: NO
HOW MANY STANDARD DRINKS CONTAINING ALCOHOL DO YOU HAVE ON A TYPICAL DAY: PATIENT DOES NOT DRINK
AUDIT-C TOTAL SCORE: 0
SUBSTANCE_ABUSE_PAST_12_MONTHS: NO
SKIP TO QUESTIONS 9-10: 1

## 2023-10-20 ASSESSMENT — COGNITIVE AND FUNCTIONAL STATUS - GENERAL
DRESSING REGULAR UPPER BODY CLOTHING: A LOT
DAILY ACTIVITIY SCORE: 11
PERSONAL GROOMING: A LITTLE
MOBILITY SCORE: 12
MOVING TO AND FROM BED TO CHAIR: A LOT
TURNING FROM BACK TO SIDE WHILE IN FLAT BAD: A LITTLE
PERSONAL GROOMING: A LOT
MOVING FROM LYING ON BACK TO SITTING ON SIDE OF FLAT BED WITH BEDRAILS: A LOT
STANDING UP FROM CHAIR USING ARMS: A LOT
TOILETING: A LOT
DRESSING REGULAR UPPER BODY CLOTHING: A LOT
MOVING TO AND FROM BED TO CHAIR: A LOT
PATIENT BASELINE BEDBOUND: NO
CLIMB 3 TO 5 STEPS WITH RAILING: TOTAL
DAILY ACTIVITIY SCORE: 14
EATING MEALS: A LITTLE
HELP NEEDED FOR BATHING: A LOT
DRESSING REGULAR LOWER BODY CLOTHING: A LOT
MOBILITY SCORE: 12
TURNING FROM BACK TO SIDE WHILE IN FLAT BAD: A LOT
WALKING IN HOSPITAL ROOM: TOTAL
EATING MEALS: A LITTLE
MOVING FROM LYING ON BACK TO SITTING ON SIDE OF FLAT BED WITH BEDRAILS: A LITTLE
WALKING IN HOSPITAL ROOM: A LOT
TOILETING: TOTAL
DRESSING REGULAR LOWER BODY CLOTHING: TOTAL
CLIMB 3 TO 5 STEPS WITH RAILING: A LOT
HELP NEEDED FOR BATHING: A LOT
STANDING UP FROM CHAIR USING ARMS: A LOT

## 2023-10-20 ASSESSMENT — ACTIVITIES OF DAILY LIVING (ADL)
FEEDING YOURSELF: NEEDS ASSISTANCE
WALKS IN HOME: NEEDS ASSISTANCE
GROOMING: NEEDS ASSISTANCE
PATIENT'S MEMORY ADEQUATE TO SAFELY COMPLETE DAILY ACTIVITIES?: NO
HEARING - RIGHT EAR: HEARING AID
DRESSING YOURSELF: NEEDS ASSISTANCE
JUDGMENT_ADEQUATE_SAFELY_COMPLETE_DAILY_ACTIVITIES: NO
TOILETING: NEEDS ASSISTANCE
ADEQUATE_TO_COMPLETE_ADL: YES
LACK_OF_TRANSPORTATION: NO
ASSISTIVE_DEVICE: HEARING AID - LEFT;HEARING AID - RIGHT
BATHING: NEEDS ASSISTANCE
HEARING - LEFT EAR: HEARING AID

## 2023-10-20 ASSESSMENT — PAIN SCALES - GENERAL
PAINLEVEL_OUTOF10: 0 - NO PAIN
PAINLEVEL_OUTOF10: 5 - MODERATE PAIN
PAINLEVEL_OUTOF10: 0 - NO PAIN
PAINLEVEL_OUTOF10: 1
PAINLEVEL_OUTOF10: 1
PAINLEVEL_OUTOF10: 0 - NO PAIN
PAINLEVEL_OUTOF10: 3

## 2023-10-20 ASSESSMENT — PAIN - FUNCTIONAL ASSESSMENT
PAIN_FUNCTIONAL_ASSESSMENT: 0-10

## 2023-10-20 ASSESSMENT — PATIENT HEALTH QUESTIONNAIRE - PHQ9
2. FEELING DOWN, DEPRESSED OR HOPELESS: NOT AT ALL
SUM OF ALL RESPONSES TO PHQ9 QUESTIONS 1 & 2: 0
1. LITTLE INTEREST OR PLEASURE IN DOING THINGS: NOT AT ALL

## 2023-10-20 ASSESSMENT — COLUMBIA-SUICIDE SEVERITY RATING SCALE - C-SSRS
1. IN THE PAST MONTH, HAVE YOU WISHED YOU WERE DEAD OR WISHED YOU COULD GO TO SLEEP AND NOT WAKE UP?: NO
6. HAVE YOU EVER DONE ANYTHING, STARTED TO DO ANYTHING, OR PREPARED TO DO ANYTHING TO END YOUR LIFE?: NO
2. HAVE YOU ACTUALLY HAD ANY THOUGHTS OF KILLING YOURSELF?: NO

## 2023-10-20 NOTE — PROGRESS NOTES
Physical Therapy    Physical Therapy Evaluation    Patient Name: Ivelisse Paulino  MRN: [id number]  Today's Date: 10/20/2023   Time Calculation  Start Time: 1040  Stop Time: 1056  Time Calculation (min): 16 min    Assessment/Plan   PT Assessment  PT Assessment Results: Decreased strength, Decreased endurance, Impaired balance, Decreased mobility  Rehab Prognosis: Good  Evaluation/Treatment Tolerance: Patient limited by pain  Medical Staff Made Aware: Yes  Strengths:  (directability)  Barriers to Participation: Comorbidities, Housing layout, Support of Caregivers, Support of extended family/friends  End of Session Communication: Bedside nurse  Assessment Comment:  (64 y/o female admitted with diabetic ketoacidosis and KLEBER who preesnts to PT with the above mentioned problems, emotional lability, but easily directable. She has a challenging home set up and lives alone. Currently she is not at her functional baseline.)  End of Session Patient Position: Bed, 3 rail up  IP OR SWING BED PT PLAN  Inpatient or Swing Bed: Inpatient  PT Plan  Treatment/Interventions: Bed mobility, Transfer training, Gait training, Stair training, Balance training, Neuromuscular re-education, Strengthening, Endurance training, Therapeutic exercise, Home exercise program  PT Plan: Skilled PT  PT Frequency: 3 times per week  PT Discharge Recommendations: Moderate intensity level of continued care  PT - OK to Discharge: Yes      Subjective   General Visit Information:  General  Reason for Referral: PT for: impaired mobility (64 y/o female admitted with chest pain, confusion, abdominal pain and elevated blood glucose (964) and in diabetic ketoacidosis and with KLEBER.)  Referred By: Andreas  Past Medical History Relevant to Rehab:  (type II DM, HTN, hypothyroid, multiple pulmonary nodules, memory problems, and SLE)  Family/Caregiver Present: No  Co-Treatment: OT  Co-Treatment Reason: maximize progressive mobility  Prior to Session Communication:  Bedside nurse (Catherine)  Patient Position Received: Bed, 3 rail up, Alarm on  General Comment: Pt agreeable to PT eval with min to moderate encouragement, easily redirectable, tearful at times during session; pt on pressor: norepinepherine gtt at 0.11 mc/mg/min.  Home Living:  Home Living  Type of Home: [place]  Lives With: Alone  Home Adaptive Equipment: None  Home Layout: One level  Home Access: Stairs to enter with rails  Entrance Stairs-Number of Steps: 3 flights (no elevator)  Prior Level of Function:  Prior Function Per Pt/Caregiver Report  Level of [place]: Independent with ADLs and functional transfers (pt reports challenges negotating stairs pre hospitalization)  Ambulatory Assistance: Independent  Leisure: enjoys games on her computer / object finding games  Precautions:  Precautions  Medical Precautions: Cardiac precautions, Fall precautions  Vital Signs:  Vital Signs  Heart Rate:  (pre 102, during 105, post 103)  Heart Rate Source: Monitor  SpO2:  (pre 98%, post 95%)  BP:  (pre 84/57 (MAP >60), discussed with RN prior to proceeding, goal is MAP > 60, during SBP , MAP >63 throughout, post SBP 90/53)  BP Location: Left arm    Objective   Pain:  Pain Assessment  Pain Assessment:  (noted abdominal pain, no numerical rating provided, moaning / cyring out noted, RN notifed and getting pain medication, interventions to tolerance)  Cognition:  Cognition  Overall Cognitive Status:  (emotional lability noted)  Orientation Level: Disoriented to place (month and year)  Attention: Exceptions to WFL (decreased attention, distractable)  Processing Speed: Delayed    Activity Tolerance  Endurance: Tolerates less than 10 min exercise, no significant change in vital signs  Early Mobility/Exercise Safety Screen: Proceed with mobilization - No exclusion criteria met         Strength  Strength Comments:  (unable to formally test MMT strength d/t processing, but at least 3+/5 tested functionlaly (no LE buckling with  standing and completing full ROM with LE bed exercise))    Static Sitting Balance  Static Sitting-Balance Support: No upper extremity supported (CGA to SBA)    Static Standing Balance  Static Standing-Balance Support: Bilateral upper extremity supported (Min +2)  Dynamic Standing Balance  Dynamic Standing-Balance Support: Bilateral upper extremity supported  Dynamic Standing-Comments:  (Min assist +2)  Functional Assessments:      Bed Mobility  Bed Mobility: Yes  Bed Mobility 1  Bed Mobility 1: Supine to sitting  Level of Assistance 1:  (SBA)  Bed Mobility Comments 1:  (HOB elevated 60 degrees)  Bed Mobility 2  Bed Mobility  2: Sitting to supine  Level of Assistance 2:  (SBA)  Bed Mobility Comments 2:  (HOB elevated 60 degrees)    Transfers  Transfer: Yes  Transfer 1  Transfer From 1:  (sit <> stand with min assist +2, HHA provided)    Ambulation/Gait Training  Ambulation/Gait Training Performed: Yes  Ambulation/Gait Training 1  Surface 1: Level tile  Device 1: No device  Assistance 1: Moderate verbal cues (Min assist +2)  Quality of Gait 1: Narrow base of support (decreased step length)  Comments/Distance (ft) 1:  (side stepping up bed 2 feet x 1 trial)    Stairs  Stairs: No  tcome Measures:  [facility] Basic Mobility  Turning from your back to your side while in a flat bed without using bedrails: A little  Moving from lying on your back to sitting on the side of a flat bed without using bedrails: A little  Moving to and from bed to chair (including a wheelchair): A lot  Standing up from a chair using your arms (e.g. wheelchair or bedside chair): A lot  To walk in hospital room: Total  Climbing 3-5 steps with railing: Total  Basic Mobility - Total Score: 12    Encounter Problems       Encounter Problems (Active)       Balance       STG - Maintains dynamic standing balance without upper extremity support (Progressing)       Start:  10/20/23    Expected End:  11/10/23       And independence             Mobility       STG  - Patient will ambulate (Progressing)       Start:  10/20/23    Expected End:  11/10/23       >/= 150 fee with adaptive vital sign response and LRAD         STG - Patient will ascend and descend a flight of stairs (Progressing)       Start:  10/20/23    Expected End:  11/10/23       With 1 HR and SBA            Pain - Adult          Transfers       STG - Transfer from bed to chair (Progressing)       Start:  10/20/23    Expected End:  11/10/23       With independence         STG - Patient will perform bed mobility (Progressing)       Start:  10/20/23    Expected End:  11/10/23       With independence         STG - Patient will transfer sit to and from stand (Progressing)       Start:  10/20/23    Expected End:  11/10/23       With independence                Education Documentation  Precautions, taught by Linnea Lin, PT at 10/20/2023  2:01 PM.  Learner: Patient  Readiness: Acceptance  Method: Explanation  Response: Verbalizes Understanding, Needs Reinforcement    Mobility Training, taught by Linnea Lin, PT at 10/20/2023  2:01 PM.  Learner: Patient  Readiness: Acceptance  Method: Explanation  Response: Verbalizes Understanding, Needs Reinforcement    Education Comments  No comments found.

## 2023-10-20 NOTE — CARE PLAN
The patient's goals for the shift include      The clinical goals for the shift include Pt to become more alert and oriented to time/year

## 2023-10-20 NOTE — PROGRESS NOTES
Medication Adjustment    The following medication(s) was/were adjusted for Ivelisse Paulino per protocol/policy due to altered renal function.    Medication(s) adjusted:   Meropenem adjusted to 1 g q12h per Estimated Creatinine Clearance: 29.8 mL/min (A) (by C-G formula based on SCr of 1.48 mg/dL (H)).    Please call with any questions.  Renata Gómez, PharmD, BCCCP  [phone]

## 2023-10-20 NOTE — NURSING NOTE
Block Charting Note    Medication: Levophed  Time block charting initiated: 1042  Starting dose: 0.11  Ending dose:0.15  Maximum dose:0.15  Time block charting completed:1400  Physiologic parameters: Map 60-70

## 2023-10-20 NOTE — PROGRESS NOTES
Occupational Therapy    Evaluation/Treatment    Patient Name: Ivelisse Paulino  MRN: [id number]  : 1960  Today's Date: 10/20/23  Time Calculation  Start Time: 1157  Stop Time: 1211  Time Calculation (min): 14 min       Assessment:  OT Assessment: Patient would benefit from further OT to address ADL's and functional transfers/mobility due to decline in baselline funtion.  Hospital admit 10/19/2023 due to chest pain, confusion, abdominal pain  Prognosis: Good  Evaluation/Treatment Tolerance: Patient limited by fatigue, Patient limited by pain  End of Session Communication: Bedside nurse  End of Session Patient Position: Bed, 3 rail up    Plan:  Treatment Interventions: ADL retraining, Functional transfer training, Endurance training, Compensatory technique education, Patient/family training, Equipment evaluation/education  OT Frequency: 3 times per week  OT Discharge Recommendations: Moderate intensity level of continued care  Treatment Interventions: ADL retraining, Functional transfer training, Endurance training, Compensatory technique education, Patient/family training, Equipment evaluation/education  Subjective     Current Problem:  1. DKA, type 2, not at goal (CMS/HCC)            2. Holosystolic murmur              Past Medical History:   Diagnosis Date    Encounter for screening for malignant neoplasm of colon 2018    Screening for colon cancer    Essential (primary) hypertension 2019    HTN (hypertension), benign    Personal history of other diseases of the digestive system     History of esophageal reflux    Personal history of other diseases of the nervous system and sense organs     History of cluster headache    Personal history of other diseases of the respiratory system     History of pleurisy    Personal history of other diseases of urinary system     History of bladder problems    Personal history of other endocrine, nutritional and metabolic disease     History of hypothyroidism     Personal history of other endocrine, nutritional and metabolic disease 02/11/2019    History of type 2 diabetes mellitus    Personal history of other endocrine, nutritional and metabolic disease 02/11/2019    History of hypothyroidism    Personal history of other endocrine, nutritional and metabolic disease 05/13/2019    History of type 2 diabetes mellitus    Personal history of other endocrine, nutritional and metabolic disease 04/15/2019    History of type 2 diabetes mellitus    Personal history of other endocrine, nutritional and metabolic disease 01/09/2019    History of type 2 diabetes mellitus    Personal history of other endocrine, nutritional and metabolic disease 03/13/2018    History of hypothyroidism    Personal history of other medical treatment 03/01/2016    History of screening mammography    Systemic lupus erythematosus, unspecified (CMS/HCC)     History of systemic lupus erythematosus (SLE)     Past Surgical History:   Procedure Laterality Date    BREAST LUMPECTOMY  09/17/2018    Breast Surgery Lumpectomy    CARPAL TUNNEL RELEASE  09/17/2018    Neuroplasty Decompression Median Nerve At Carpal Tunnel    HAND TENDON SURGERY  09/17/2018    Hand Incision Tendon Sheath Of A Finger    OTHER SURGICAL HISTORY  09/17/2018    Surgery Of Esophagus       General:   OT Received On: 10/20/23  General  Reason for Referral: ADL, safety  Referred By: Andreas  Prior to Session Communication: Bedside nurse  Patient Position Received: Bed, 3 rail up, Alarm on  General Comment: Patient seen in room 126; tearful due to severe pain at abdomen (informed RN who will follow-up on pain medication)    Precautions:  Medical Precautions: Fall precautions, Cardiac precautions     Objective     Cognition:  Overall Cognitive Status:  (provided emotional support and encouragement)  Orientation Level: Disoriented to place  Attention: Exceptions to WFL (decrease alertness)  Processing Speed: Delayed (including execution and  responses)    Home Living:  Type of Home: [place]  Lives With: Alone  Home Layout: One level  Home Access: Stairs to enter with rails  Entrance Stairs-Number of Steps: 3 flights per patient; use of tub/shower unit    Prior Function:  Level of [place]: Independent with ADLs and functional transfers, Independent with ambulation without device however reported if has loss of balance will support self against walls  Ambulatory Assistance: Independent    ADL Current:  LE Dressing Assistance: Total (anticipate)  ADL Comments: anticipate carryover assist for other self-care areas such as underwear/pants, bathng, toileting.  Would benefit from trialing ADL adaptive equipment to facilitate indep and ease in performance.    Bed Mobility/Transfers: Bed Mobility 1  Bed Mobility 1: Supine to sitting, Sitting to supine  Level of Assistance 1:  (SBA with HOB elevated)    Transfer 1  Transfer From 1: Sit to, Stand to  Transfer to 1: Bed  Transfer Level of Assistance 1: Minimum assistance, Minimal verbal cues  Trials/Comments 1: hand-held assist of 2 staff     Sensation:  Light Touch: No apparent deficits    Extremities: RUE   RUE : Within Functional Limits and LUE   LUE: Within Functional Limits    Outcome Measures: [facility] Daily Activity  Putting on and taking off regular lower body clothing: Total  Bathing (including washing, rinsing, drying): A lot  Putting on and taking off regular upper body clothing: A lot  Toileting, which includes using toilet, bedpan or urinal: Total  Taking care of personal grooming such as brushing teeth: A lot  Eating Meals: A little  Daily Activity - Total Score: 11    Education Documentation  ADL Training, taught by Harsha Jose OT at 10/20/2023  3:37 PM.  Learner: Patient  Readiness: Acceptance  Method: Explanation  Response: Verbalizes Understanding, Needs Reinforcement  Comment: initiated instruction in proper mobility techniques    Goals:  Encounter Problems            OT Goals       Patient  will apply energy conservation/diaphragmatic breathing techniques to ADL's and functional transfers with minimal cues  (Progressing)       Start:  10/20/23    Expected End:  11/03/23            Patient with complete lower body bathing/dressing and toileting with modified independence using adaptive equipment as needed  (Progressing)       Start:  10/20/23    Expected End:  11/03/23            Patient will perform bed mobility and functional transfers independently: bed,chair, commode using DME as needed  (Progressing)       Start:  10/20/23    Expected End:  11/03/23            Patient will tolerate standing for 8 mins. and show good (-) standing balance during ADL's and functional transfers/mobility  (Progressing)       Start:  10/20/23    Expected End:  11/03/23

## 2023-10-20 NOTE — CARE PLAN
Problem: Fall/Injury  Goal: Be free from injury by end of the shift  Outcome: Progressing  Goal: Verbalize understanding of personal risk factors for fall in the hospital  Outcome: Progressing     Problem: Pain  Goal: My pain/discomfort is manageable  Outcome: Progressing     Problem: Safety  Goal: Patient will be injury free during hospitalization  Outcome: Progressing  Goal: I will remain free of falls  Outcome: Progressing     Problem: Daily Care  Goal: Daily care needs are met  Outcome: Progressing     Problem: Psychosocial Needs  Goal: Demonstrates ability to cope with hospitalization/illness  Outcome: Progressing  Goal: Collaborate with me, my family, and caregiver to identify my specific goals  Outcome: Progressing  Flowsheets (Taken 10/20/2023 0015)  Cultural Requests During Hospitalization: no  Spiritual Requests During Hospitalization: no     Problem: Diabetes  Goal: Achieve decreasing blood glucose levels by end of shift  Outcome: Progressing  Goal: Increase stability of blood glucose readings by end of shift  Outcome: Progressing   The patient's goals for the shift include      The clinical goals for the shift include Pt to become more alert and oriented to time/year

## 2023-10-20 NOTE — PROGRESS NOTES
Vancomycin Dosing by Pharmacy- INITIAL    Ivelisse Paulino is a 63 y.o. year old female who Pharmacy has been consulted for vancomycin dosing for other shock . Based on the patient's indication and renal status this patient will be dosed based on a goal AUC of 400-600.     Renal function is currently improving.    Visit Vitals  BP 82/52   Pulse 100   Temp 36.8 °C (98.2 °F) (Temporal)   Resp 16        Lab Results   Component Value Date    CREATININE 1.48 (H) 10/20/2023    CREATININE 1.57 (H) 10/20/2023    CREATININE 1.64 (H) 10/19/2023    CREATININE 1.48 (H) 10/19/2023      Lab Results   Component Value Date    BLOODCULT Loaded on Instrument - Culture in progress 10/19/2023    BLOODCULT Loaded on Instrument - Culture in progress 10/19/2023       I/O last 3 completed shifts:  In: 2000 (41.2 mL/kg) [IV Piggyback:2000]  Out: 3370 (69.5 mL/kg) [Urine:2000 (1.1 mL/kg/hr); Drains:1370]  Weight: 48.5 kg     Lab Results   Component Value Date    PATIENTTEMP 37.0 10/19/2023    PATIENTTEMP 37.0 10/19/2023    PATIENTTEMP 37.0 10/19/2023          Assessment/Plan     Day #2 of vancomycin therapy. Patient has already been given a loading dose of 1000 mg.  Will initiate vancomycin maintenance,  750 mg every 24 hours.    This dosing regimen is predicted by [employer] to result in the following pharmacokinetic parameters:  AUC24,ss: 485 mg/L.hr  Probability of AUC24 > 400: 72 %  Ctrough,ss: 15.4 mg/L  Probability of Ctrough,ss > 20: 24 %  Probability of nephrotoxicity (Lodise XI 2009): 11 %    Follow-up level will be ordered on 10/21 with AM labs unless clinically indicated sooner.  Will continue to monitor renal function daily while on vancomycin and order serum creatinine at least every 48 hours if not already ordered.  Follow for continued vancomycin needs, clinical response, and signs/symptoms of toxicity.     Please call with any questions.  Renata Gómez, PharmD, [facility]  [phone]

## 2023-10-20 NOTE — CONSULTS
Inpatient consult to Endocrinology  Consult performed by: Ian Ashley DO  Consult ordered by: Anthony Johns DO      Ivelisse Paulino is a 63 y.o. who presents for Chest Pain and Altered Mental Status (Patient arrives from home w/ complaints of chestpain 9/10 and confusion x2.   Patient from home).    HPI:  This is a 63-year-old patient coming for altered mental status.  She was also apparently having chest pain per the emergency physician note.  It appears that the patient was quite altered on arrival and as such the history leading up to presentation as well as her complaints are quite limited.  What we do know, is that on admission her glucose was 964.  Her bicarbonate level was 5.  Her beta-hydroxybutyrate was 12.7.  pH was 6.92.  She was given tutors lactated Ringer's and started on insulin drip 16 as per hour.  She was given several pushes of sodium bicarbonate.  She was also given an additional few boluses of regular insulin to the IV.  She was started on vancomycin and aztreonam.  She was given 10 units of glargine.  The patient's insulin drip, at the time of our arrival to the ICU, has been turned off.  She has been switched to sliding scale insulin.  On our interview, she is quite altered and is difficult to have any meaningful conversation with.  She answers a few orientation questions correctly however meaningful conversation and a detailed story about her condition are impossible.  She is on vasopressor support on norepinephrine running peripherally.  Her heart rate is in the low 100s.  She appears emotional and tearful.  Outpatient chart review shows a longstanding history of type 2 diabetes without apparent use of insulin.  This was listed in an outpatient office note within the last week or so.  Most recent A1c we have on file is from March 2022 which was 7.5.  Her medications every day are atorvastatin 40 mg, hydroxychloroquine, levothyroxine, lisinopril.  Per outpatient notes, the patient has  been feeling quite dizzy lately and has been struggling to take lisinopril properly as she has been feeling dizzy.  She was instructed to drink a lot of fluids and eat salty foods.  She has never had DKA, per chart review.  Additionally, outpatient note review showed that she had a BMI of around 25 about 1 year ago.  Here in the ICU her BMI is around 17.  Endocrinology has been consulted for management of diabetic ketoacidosis.    Unable to attain meaningful review of systems secondary to patient mental status    All other systems were reviewed and are negative unless stated.     Medical history: Type II DM, hypothyroidism, SLE  Surgical history: Some sort of esophageal surgery, lobectomy, carpal tunnel release  Family history: Hypertension  Social history: Non-smoker, formally?  No next of kin or POA    Physical Exam:  General: Cachectic, altered, tearful  HEENT: no obvious lesions  Neck: supple without obviously elevated JVP  Cardiovascular: regular rate, regular rhythm, no peripheral edema  Respiratory: appropriate respiratory effort, no wheezing or use of accessory muscles  Abdominal: no organomegaly or tenderness to palpation, no peritoneal signs  MSK: Extremely frail musculature, borderline atrophic  Skin: no obvious lesions or rashes  Neurologic: Oriented to person and place however significantly altered, nonfocal, difficult to perform any meaningful neurologic exam second to her mental status  Psychatric: Tearful, confused, anxious    Visit Vitals  BP 77/50   Pulse 101   Temp 36.5 °C (97.7 °F)   Resp 20        All other imaging, labs, and recent documents were reviewed and discussed.    Assessment & Plan:    Date  PreBreakfast Pre Lunch Pre Dinner Bedtime Midnight   10/19    >600 (Gtt, G10) 530 (Gtt)   10/20 233 (Gtt)                         Type 2 DM (Type 1 DM?)  Most recent A1c on file was 7.5% last year.  New A1c is pending.  No reported use of insulin on outpatient notes recently.  Patient is extremely  frail and as such, doubt she has true type 2 diabetes.  She has multiple autoimmune diseases which raises suspicion for beta cell destruction.  She is behaving more like a type I diabetic at this point in time.    Plan:  Okay with glargine 12 units in the morning. This should not be held for any reason.   4 units lispro 3 times a day with meals, if she is not eating cover her with sliding scale only.  Once encephalopathy is resolved we can attempt education on the use of insulin.  It appears her options may be limited given that she is, for all intents and purposes, type I diabetic.    2. DKA  Unclear trigger.  Suspect patient has significant beta cell burnout and resultant type 1 diabetes.  This could be her sentinel presentation of this.   She is off the insulin infusion as of 10/20.  Has been switched to MDI.  Will need to be on, at the very least, glargine at home.  Diabetic education when encephalopathy resolved.    3. Hypothyroid  Continue levothyroxine at its current dose.  TSH pending however caveat that given acute illness it may be altered.    4. Dyslipidemia  Maintained on atorvastatin 40 mg daily.    5. HTN  Maintained on lisinopril 10 mg as an outpatient.  Per chart review, has been experiencing dizziness upon standing.  Would be hesitant to restart lisinopril particular as patient is now vasopressor support.    6.  SLE  Does have patchy hair loss, has had numerous orthopedic procedures, however stated this is in remission.  Continue hydroxychloroquine.  Multiple autoimmune diseases as stated above.    7.  Encephalopathy  Can certainly blame DKA for now.  Once DKA is resolved, if patient remains altered have to investigate insidious causes.  Management per ICU team.    8.  Hypotension  Patient is maintained on antihypertensives as an outpatient.  On norepinephrine.  Will send a.m. cortisol.  Could be multifactorial given hypovolemia from osmotic diuresis combined with ACE inhibitor use.  Management per  ICU team.     Please await attending attestation for finalized plan.    Ian Ashley, DO    This note was entered with assistance of voice recognition software, minor typographic errors may be present.     _________________________  STAFF TEACHING ADDENDUM  I have reviewed the progress note obtained and documented by Dr. Ashley and I personally participated in the key components. I have discussed the case and management of the patient's care with Dr. Ashley. I have edited the note as needed.    In addition:  Based on the history I obtained, new consult for diabetic ketoacidosis.  I did review that the patient had some previous outpatient encounters and was noted to have type 2 diabetes mellitus.  She actually saw endocrinology back in 2019 over at [facility].  At that point she was only on metformin.  I was unable to obtain any history from the patient this morning as she is extremely inattentive.  She cannot contribute to her history.  Per RN, this is at her baseline since she has presented to the hospital.    On my exam, and attentive.  Alert and oriented x 2.  Thinks it is the year 2000.  Alopecia on her posterior aspect of her head.  Slim appearing.  No vitiligo.  No abdominal tenderness.     Assessment and Plan: as noted above post discussion with the resident:    # Diabetic Ketoacidosis and DM NOS: Per chart review appears that she had type 2 diabetes and maintained on metformin for quite some time with a good A1c.  Is this burned-out diabetes mellitus.  She does have a very lean phenotype and very well could have autoimmune diabetes.  The patient has a chart diagnosis of systemic lupus erythematosus.  This does increase her risk of having autoimmune diabetes.  Nonetheless, the patient's anion gap and metabolic acidosis have resolved.  We will start her on a weight-based multiple daily injection program.  The patient is hungry.  I have discussed with the RN and she will do a bedside nursing swallow to see  if she is cleared from her perspective for safe oral intake.  If that is the case we will start her on prandial insulin.  If she is not cleared for oral intake, we will keep her on a basal insulin and sliding scale insulin program.    # KLEBER: Likely in the setting of osmotic diuresis leading to prerenal azotemia.  Trend chemistry.  [place] use of isotonic fluids as you are.      # Hypothyroidism: Maintained on levothyroxine.  I have ordered a TSH.  Once her mental status is improved, I will interrogate how much she is taking if she is taking it with proper etiquette.    # NAFLD: Incidentally seen on cross-sectional imaging.  She surely does not have the phenotype for the same.    #Encephalopathy: Suspect this is secondary to her metabolic disturbances as mentioned above.  Let us try to achieve euglycemia and see if this persist.  Frequent reorientation.    # Dyslipidemia: We will explore more when she is more clinically stable.    Donny Garcia,   Endocrinology, Diabetes, and Metabolism  10/20/2023 7:27 AM  __________________________

## 2023-10-20 NOTE — PROGRESS NOTES
10/20/23 1536   Discharge Planning   Living Arrangements Alone   Support Systems Friends/neighbors   Assistance Needed independent prior to admission   Type of Residence Private residence     Patient is confused, oriented to self onjly, unable to participate in assessment.  She presented from home where is independent with ADLs at baseline, noncompliant with insulin regimen.  PT [facility] 12; Care Coordinationj team following for assistance with discharge planning.  [facility] SENIOR T[facility]

## 2023-10-20 NOTE — PROGRESS NOTES
Ivelisse Plummer is a 63 y.o. female on day 1 of admission presenting with DKA, type 2, not at goal (CMS/HCC).      Subjective   Patient seen and examined at bedside.  DKA episode resolved.  Remains hypotensive 70 systolic.  Patient more orientated compared to yesterday..  Eating and drinking okay.  Bowels.  No urinary symptoms.       Objective     Last Recorded Vitals  BP 78/50   Pulse 101   Temp 36.8 °C (98.2 °F) (Temporal)   Resp 24   Wt 48.5 kg (106 lb 14.8 oz)   SpO2 97%   Intake/Output last 3 Shifts:    Intake/Output Summary (Last 24 hours) at 10/20/2023 1558  Last data filed at 10/20/2023 1333  Gross per 24 hour   Intake 3297.78 ml   Output 3895 ml   Net -597.22 ml       Admission Weight  Weight: 63.5 kg (140 lb) (10/19/23 1455)    Daily Weight  10/20/23 : 48.5 kg (106 lb 14.8 oz)    Image Results  Transthoracic Echo (TTE) Complete      [facility], [address] [place], [place] [address] [phone] and                                  Fax [phone]    TRANSTHORACIC ECHOCARDIOGRAM REPORT       Patient Name:     IVELISSE PLUMMER    Reading Physician:   [id number] Jeancarlos Kerr MD  Study Date:       10/20/2023          Ordering Provider:   [id number] MICHAEL MAZA  MRN/PID:          [id number]            Fellow:  Accession#:       [id number]        Nurse:  Date of           1960 / 63      Sonographer:         Peter Edwards RDCS  Birth/Age:        years  Gender:           F                   Additional Staff:  Height:           167.64 cm           Admit Date:          10/19/2023  Weight:           48.08 kg            Admission Status:    Inpatient - Routine  BSA:              1.53 m2             Encounter#:          [id number]                                        Department Location: [facility]  Blood Pressure: 86 /54 mmHg    Study Type:    TRANSTHORACIC ECHO (TTE) COMPLETE  Diagnosis/ICD: Cardiac murmur, unspecified-R01.1  CPT Code:       Echo Complete w Full Doppler-41267; Myocardial Strain                 Imaging-[id number]    Patient History:  Pertinent History: Murmur.    Study Detail: The following Echo studies were performed: 2D, M-Mode, Doppler,                color flow and Strain. Technically challenging study due to body                habitus and patient lying in supine position.       PHYSICIAN INTERPRETATION:  Left Ventricle: The left ventricular systolic function is normal, with an estimated ejection fraction of 60-65%. There are no regional wall motion abnormalities. The left ventricular cavity size is decreased. Left ventricular diastolic filling was indeterminate.  Left Atrium: The left atrium is normal in size.  Right Ventricle: The right ventricle is normal in size. There is normal right ventricular global systolic function. RV anterior free wall appears thickened.  Right Atrium: The right atrium is normal in size.  Aortic Valve: The aortic valve is trileaflet. There is mild to moderate aortic valve thickening. There is no evidence of aortic valve regurgitation. The peak instantaneous gradient of the aortic valve is 9.1 mmHg.  Mitral Valve: The mitral valve is moderately thickened. There is trace mitral valve regurgitation.  Tricuspid Valve: The tricuspid valve is structurally normal. There is moderate to severe tricuspid regurgitation. The Doppler estimated RVSP is slightly elevated at 32.6 mmHg.  Pulmonic Valve: The pulmonic valve is structurally normal. There is trace pulmonic valve regurgitation.  Pericardium: There is a small to moderate pericardial effusion posterior to the left ventricle. The effusion is circumferential.  Aorta: The aortic root is normal. There is no dilatation of the ascending aorta. There is no dilatation of the aortic root.  Systemic Veins: The inferior vena cava appears to be of normal size.       CONCLUSIONS:   1. Left ventricular systolic function is normal with a 60-65% estimated ejection fraction.   2.  There is a small to moderate pericardial effusion.   3. The mitral valve is moderately thickened.   4. Moderate to severe tricuspid regurgitation visualized.   5. Slightly elevated RVSP.   6. Left ventricular cavity size is decreased.    QUANTITATIVE DATA SUMMARY:  2D MEASUREMENTS:                           Normal Ranges:  LAs:           2.70 cm   (2.7-4.0cm)  IVSd:          0.89 cm   (0.6-1.1cm)  LVPWd:         0.83 cm   (0.6-1.1cm)  LVIDd:         3.53 cm   (3.9-5.9cm)  LVIDs:         2.50 cm  LV Mass Index: 55.6 g/m2  LV % FS        29.2 %    LA VOLUME:                              Normal Ranges:  LA Volume Index: 10.0 ml/m2    RA VOLUME BY A/L METHOD:                        Normal Ranges:  RA Area A4C: 10.0 cm2    M-MODE MEASUREMENTS:                   Normal Ranges:  Ao Root: 2.70 cm (2.0-3.7cm)  LAs:     3.20 cm (2.7-4.0cm)    AORTA MEASUREMENTS:                     Normal Ranges:  Asc Ao, d: 2.60 cm (2.1-3.4cm)    LV SYSTOLIC FUNCTION BY 2D PLANIMETRY (MOD):                      Normal Ranges:  EF-A4C View: 68.6 % (>=55%)  EF-A2C View: 53.5 %  EF-Biplane:  61.3 %    LV DIASTOLIC FUNCTION:                            Normal Ranges:  MV Peak E:    0.63 m/s    (0.7-1.2 m/s)  MV Peak A:    0.56 m/s    (0.42-0.7 m/s)  E/A Ratio:    1.14        (1.0-2.2)  MV lateral e' 0.07 m/s  MV medial e'  0.10 m/s  MV A Dur:     120.00 msec    MITRAL VALVE:                  Normal Ranges:  MV DT: 208 msec (150-240msec)    AORTIC VALVE:                          Normal Ranges:  AoV Vmax:      1.51 m/s (<=1.7m/s)  AoV Peak P.1 mmHg (<20mmHg)  LVOT Max David:  0.90 m/s (<=1.1m/s)  LVOT VTI:      14.30 cm  LVOT Diameter: 1.90 cm  (1.8-2.4cm)  AoV Area,Vmax: 1.69 cm2 (2.5-4.5cm2)       RIGHT VENTRICLE:  RV Basal 2.22 cm  RV Mid   1.66 cm  RV Major 7.1 cm  TAPSE:   15.6 mm  RV s'    0.15 m/s    TRICUSPID VALVE/RVSP:                              Normal Ranges:  Peak TR Velocity: 2.72 m/s  RV Syst Pressure: 32.6 mmHg (<  30mmHg)    PULMONIC VALVE:                       Normal Ranges:  PV Max David: 0.7 m/s  (0.6-0.9m/s)  PV Max P.1 mmHg       [id number] Jeancarlos Kerr MD  Electronically signed on 10/20/2023 at 10:09:08 AM       ** Final **      Physical Exam  Physical Exam:  General: Alert and orientated x3.  Patient in distress due to abdominal pain  HEENT:  Normocephalic, atraumatic, mucus membranes moist.  Smells of ketones on breath  Neck:  Trachea midline.  No JVD.    Chest:  Clear to auscultation bilaterally. No wheezes, rales, or rhonchi.  CV:  Regular rate and rhythm.  Positive S1/S2.   Abdomen: Soft, diffuse tenderness.  Bowel sounds present.  Extremities:  No lower extremity edema or cyanosis.   Neurological:  AAOx3. No focal deficits.  Skin:  Warm and dry.   Relevant Results      Results for orders placed or performed during the hospital encounter of 10/19/23 (from the past 24 hour(s))   Light Blue Top   Result Value Ref Range    Extra Tube Hold for add-ons.    SST TOP   Result Value Ref Range    Extra Tube Hold for add-ons.    POCT GLUCOSE   Result Value Ref Range    POCT Glucose >600 (H) 74 - 99 mg/dL   POCT GLUCOSE   Result Value Ref Range    POCT Glucose >600 (H) 74 - 99 mg/dL   BLOOD GAS VENOUS   Result Value Ref Range    POCT pH, Venous 7.16 (LL) 7.33 - 7.43 pH    POCT pCO2, Venous 28 (L) 41 - 51 mm Hg    POCT pO2, Venous 20 (L) 35 - 45 mm Hg    POCT SO2, Venous 43 (L) 45 - 75 %    POCT Oxy Hemoglobin, Venous 42.6 (L) 45.0 - 75.0 %    POCT Base Excess, Venous -17.3 (L) -2.0 - 3.0 mmol/L    POCT HCO3 Calculated, Venous 10.0 (L) 22.0 - 26.0 mmol/L    Patient Temperature 37.0 degrees Celsius    FiO2 21 %    Test Comment ICU-S     Critical Called By MARY ANDINO, SHARON     Critical Called To DR. STARK     Critical Call Time .0000     Critical Read Back Y     Critical Note RAN X2    Basic metabolic panel   Result Value Ref Range    Glucose 732 (HH) 74 - 99 mg/dL    Sodium 135 (L) 136 - 145 mmol/L    Potassium 3.7 3.5 - 5.3  mmol/L    Chloride 101 98 - 107 mmol/L    Bicarbonate 9 (LL) 21 - 32 mmol/L    Anion Gap 29 (H) 10 - 20 mmol/L    Urea Nitrogen 37 (H) 6 - 23 mg/dL    Creatinine 1.48 (H) 0.50 - 1.05 mg/dL    eGFR 40 (L) >60 mL/min/1.73m*2    Calcium 9.8 8.6 - 10.3 mg/dL   Blood Culture    Specimen: Peripheral Venipuncture; Blood culture   Result Value Ref Range    Blood Culture Loaded on Instrument - Culture in progress    Blood Culture    Specimen: Peripheral Venipuncture; Blood culture   Result Value Ref Range    Blood Culture Loaded on Instrument - Culture in progress    POCT GLUCOSE   Result Value Ref Range    POCT Glucose >600 (H) 74 - 99 mg/dL   POCT GLUCOSE   Result Value Ref Range    POCT Glucose >600 (H) 74 - 99 mg/dL   Glucose, random   Result Value Ref Range    Glucose 658 (HH) 74 - 99 mg/dL   Basic metabolic panel   Result Value Ref Range    Glucose 658 (HH) 74 - 99 mg/dL    Sodium 134 (L) 136 - 145 mmol/L    Potassium 3.8 3.5 - 5.3 mmol/L    Chloride 100 98 - 107 mmol/L    Bicarbonate 12 (L) 21 - 32 mmol/L    Anion Gap 26 (H) 10 - 20 mmol/L    Urea Nitrogen 37 (H) 6 - 23 mg/dL    Creatinine 1.64 (H) 0.50 - 1.05 mg/dL    eGFR 35 (L) >60 mL/min/1.73m*2    Calcium 10.6 (H) 8.6 - 10.3 mg/dL   POCT GLUCOSE   Result Value Ref Range    POCT Glucose 557 (H) 74 - 99 mg/dL   POCT GLUCOSE   Result Value Ref Range    POCT Glucose 530 (H) 74 - 99 mg/dL   Basic metabolic panel   Result Value Ref Range    Glucose 559 (HH) 74 - 99 mg/dL    Sodium 136 136 - 145 mmol/L    Potassium 3.6 3.5 - 5.3 mmol/L    Chloride 104 98 - 107 mmol/L    Bicarbonate 18 (L) 21 - 32 mmol/L    Anion Gap 18 10 - 20 mmol/L    Urea Nitrogen 38 (H) 6 - 23 mg/dL    Creatinine 1.57 (H) 0.50 - 1.05 mg/dL    eGFR 37 (L) >60 mL/min/1.73m*2    Calcium 10.2 8.6 - 10.3 mg/dL   Lactate   Result Value Ref Range    Lactate 1.4 0.4 - 2.0 mmol/L   POCT GLUCOSE   Result Value Ref Range    POCT Glucose 501 (H) 74 - 99 mg/dL   POCT GLUCOSE   Result Value Ref Range    POCT  Glucose 481 (H) 74 - 99 mg/dL   Blood Gas Venous Full Panel   Result Value Ref Range    POCT pH, Venous 7.38 7.33 - 7.43 pH    POCT pCO2, Venous 38 (L) 41 - 51 mm Hg    POCT pO2, Venous 47 (H) 35 - 45 mm Hg    POCT SO2, Venous 86 (H) 45 - 75 %    POCT Oxy Hemoglobin, Venous 84.5 (H) 45.0 - 75.0 %    POCT Hematocrit Calculated, Venous 36.0 36.0 - 46.0 %    POCT Sodium, Venous 137 136 - 145 mmol/L    POCT Potassium, Venous 3.6 3.5 - 5.3 mmol/L    POCT Chloride, Venous 105 98 - 107 mmol/L    POCT Ionized Calicum, Venous 1.49 (H) 1.10 - 1.33 mmol/L    POCT Glucose, Venous 487 (HH) 74 - 99 mg/dL    POCT Lactate, Venous 1.7 0.4 - 2.0 mmol/L    POCT Base Excess, Venous -2.3 (L) -2.0 - 3.0 mmol/L    POCT HCO3 Calculated, Venous 22.5 22.0 - 26.0 mmol/L    POCT Hemoglobin, Venous 11.9 (L) 12.0 - 16.0 g/dL    POCT Anion Gap, Venous 13.0 10.0 - 25.0 mmol/L    Patient Temperature 37.0 degrees Celsius    FiO2 21 %    Critical Called By RAHEEM RIVERA RRT     Critical Called To DR MAZA     Critical Call Time 258.0000     Critical Read Back Y    POCT GLUCOSE   Result Value Ref Range    POCT Glucose 419 (H) 74 - 99 mg/dL   POCT GLUCOSE   Result Value Ref Range    POCT Glucose 337 (H) 74 - 99 mg/dL   POCT GLUCOSE   Result Value Ref Range    POCT Glucose 279 (H) 74 - 99 mg/dL   Basic metabolic panel   Result Value Ref Range    Glucose 274 (H) 74 - 99 mg/dL    Sodium 140 136 - 145 mmol/L    Potassium 3.6 3.5 - 5.3 mmol/L    Chloride 110 (H) 98 - 107 mmol/L    Bicarbonate 24 21 - 32 mmol/L    Anion Gap 10 10 - 20 mmol/L    Urea Nitrogen 34 (H) 6 - 23 mg/dL    Creatinine 1.48 (H) 0.50 - 1.05 mg/dL    eGFR 40 (L) >60 mL/min/1.73m*2    Calcium 10.2 8.6 - 10.3 mg/dL   Magnesium   Result Value Ref Range    Magnesium 1.96 1.60 - 2.40 mg/dL   CBC   Result Value Ref Range    WBC 13.2 (H) 4.4 - 11.3 x10*3/uL    nRBC 0.0 0.0 - 0.0 /100 WBCs    RBC 3.22 (L) 4.00 - 5.20 x10*6/uL    Hemoglobin 10.8 (L) 12.0 - 16.0 g/dL    Hematocrit 32.9 (L) 36.0 -  46.0 %     (H) 80 - 100 fL    MCH 33.5 26.0 - 34.0 pg    MCHC 32.8 32.0 - 36.0 g/dL    RDW 12.9 11.5 - 14.5 %    Platelets 227 150 - 450 x10*3/uL    MPV 9.9 7.5 - 11.5 fL   POCT GLUCOSE   Result Value Ref Range    POCT Glucose 233 (H) 74 - 99 mg/dL   POCT GLUCOSE   Result Value Ref Range    POCT Glucose 187 (H) 74 - 99 mg/dL   POCT GLUCOSE   Result Value Ref Range    POCT Glucose 172 (H) 74 - 99 mg/dL   Transthoracic Echo (TTE) Complete   Result Value Ref Range    LV A4C EF 68.6    Albumin, Urine Random   Result Value Ref Range    Albumin, Urine Random 67.6 Not established mg/L    Creatinine, Urine Random 59.4 20.0 - 320.0 mg/dL    Albumin/Creatine Ratio 113.8 (H) <30.0 ug/mg Creat   Basic metabolic panel   Result Value Ref Range    Glucose 141 (H) 74 - 99 mg/dL    Sodium 140 136 - 145 mmol/L    Potassium 3.5 3.5 - 5.3 mmol/L    Chloride 108 (H) 98 - 107 mmol/L    Bicarbonate 24 21 - 32 mmol/L    Anion Gap 12 10 - 20 mmol/L    Urea Nitrogen 31 (H) 6 - 23 mg/dL    Creatinine 1.53 (H) 0.50 - 1.05 mg/dL    eGFR 38 (L) >60 mL/min/1.73m*2    Calcium 10.3 8.6 - 10.3 mg/dL   Hemoglobin A1C   Result Value Ref Range    Hemoglobin A1C 15.3 (H) see below %    Estimated Average Glucose 392 Not Established mg/dL   TSH   Result Value Ref Range    Thyroid Stimulating Hormone 4.78 (H) 0.44 - 3.98 mIU/L   Sedimentation rate, automated   Result Value Ref Range    Sedimentation Rate 94 (H) 0 - 30 mm/h   C-reactive protein   Result Value Ref Range    C-Reactive Protein 15.52 (H) <1.00 mg/dL   POCT GLUCOSE   Result Value Ref Range    POCT Glucose 71 (L) 74 - 99 mg/dL   Drug Screen, Urine   Result Value Ref Range    Amphetamine Screen, Urine Presumptive Negative Presumptive Negative    Barbiturate Screen, Urine Presumptive Negative Presumptive Negative    Benzodiazepines Screen, Urine Presumptive Negative Presumptive Negative    Cannabinoid Screen, Urine Presumptive Negative Presumptive Negative    Cocaine Metabolite Screen, Urine  Presumptive Negative Presumptive Negative    Fentanyl Screen, Urine Presumptive Negative Presumptive Negative    Opiate Screen, Urine Presumptive Negative Presumptive Negative    Oxycodone Screen, Urine Presumptive Negative Presumptive Negative    PCP Screen, Urine Presumptive Negative Presumptive Negative    Transthoracic Echo (TTE) Complete    Result Date: 10/20/2023    [facility], [place], [facility] [id number] [phone] and                                 Fax [phone] TRANSTHORACIC ECHOCARDIOGRAM REPORT  Patient Name:     RENEE Diego Physician:   [id number] Jeancarlos Kerr MD Study Date:       10/20/2023          Ordering Provider:   [id number] ABHINAVDUANE LINK MRN/PID:          [id number]            Fellow: Accession#:       [id number]        Nurse: Date of           1960 / 63      Sonographer:         Peter Edwards RDCS Birth/Age:        years Gender:           F                   Additional Staff: Height:           167.64 cm           Admit Date:          10/19/2023 Weight:           48.08 kg            Admission Status:    Inpatient - Routine BSA:              1.53 m2             Encounter#:          [id number]                                       Department Location: [facility] Blood Pressure: 86 /54 mmHg Study Type:    TRANSTHORACIC ECHO (TTE) COMPLETE Diagnosis/ICD: Cardiac murmur, unspecified-R01.1 CPT Code:      Echo Complete w Full Doppler-82028; Myocardial Strain                Imaging-[id number] Patient History: Pertinent History: Murmur. Study Detail: The following Echo studies were performed: 2D, M-Mode, Doppler,               color flow and Strain. Technically challenging study due to body               habitus and patient lying in supine position.  PHYSICIAN INTERPRETATION: Left Ventricle: The left ventricular systolic function is normal, with an estimated ejection fraction of 60-65%. There are no regional  wall motion abnormalities. The left ventricular cavity size is decreased. Left ventricular diastolic filling was indeterminate. Left Atrium: The left atrium is normal in size. Right Ventricle: The right ventricle is normal in size. There is normal right ventricular global systolic function. RV anterior free wall appears thickened. Right Atrium: The right atrium is normal in size. Aortic Valve: The aortic valve is trileaflet. There is mild to moderate aortic valve thickening. There is no evidence of aortic valve regurgitation. The peak instantaneous gradient of the aortic valve is 9.1 mmHg. Mitral Valve: The mitral valve is moderately thickened. There is trace mitral valve regurgitation. Tricuspid Valve: The tricuspid valve is structurally normal. There is moderate to severe tricuspid regurgitation. The Doppler estimated RVSP is slightly elevated at 32.6 mmHg. Pulmonic Valve: The pulmonic valve is structurally normal. There is trace pulmonic valve regurgitation. Pericardium: There is a small to moderate pericardial effusion posterior to the left ventricle. The effusion is circumferential. Aorta: The aortic root is normal. There is no dilatation of the ascending aorta. There is no dilatation of the aortic root. Systemic Veins: The inferior vena cava appears to be of normal size.  CONCLUSIONS:  1. Left ventricular systolic function is normal with a 60-65% estimated ejection fraction.  2. There is a small to moderate pericardial effusion.  3. The mitral valve is moderately thickened.  4. Moderate to severe tricuspid regurgitation visualized.  5. Slightly elevated RVSP.  6. Left ventricular cavity size is decreased. QUANTITATIVE DATA SUMMARY: 2D MEASUREMENTS:                          Normal Ranges: LAs:           2.70 cm   (2.7-4.0cm) IVSd:          0.89 cm   (0.6-1.1cm) LVPWd:         0.83 cm   (0.6-1.1cm) LVIDd:         3.53 cm   (3.9-5.9cm) LVIDs:         2.50 cm LV Mass Index: 55.6 g/m2 LV % FS        29.2 % LA  VOLUME:                             Normal Ranges: LA Volume Index: 10.0 ml/m2 RA VOLUME BY A/L METHOD:                       Normal Ranges: RA Area A4C: 10.0 cm2 M-MODE MEASUREMENTS:                  Normal Ranges: Ao Root: 2.70 cm (2.0-3.7cm) LAs:     3.20 cm (2.7-4.0cm) AORTA MEASUREMENTS:                    Normal Ranges: Asc Ao, d: 2.60 cm (2.1-3.4cm) LV SYSTOLIC FUNCTION BY 2D PLANIMETRY (MOD):                     Normal Ranges: EF-A4C View: 68.6 % (>=55%) EF-A2C View: 53.5 % EF-Biplane:  61.3 % LV DIASTOLIC FUNCTION:                           Normal Ranges: MV Peak E:    0.63 m/s    (0.7-1.2 m/s) MV Peak A:    0.56 m/s    (0.42-0.7 m/s) E/A Ratio:    1.14        (1.0-2.2) MV lateral e' 0.07 m/s MV medial e'  0.10 m/s MV A Dur:     120.00 msec MITRAL VALVE:                 Normal Ranges: MV DT: 208 msec (150-240msec) AORTIC VALVE:                         Normal Ranges: AoV Vmax:      1.51 m/s (<=1.7m/s) AoV Peak P.1 mmHg (<20mmHg) LVOT Max David:  0.90 m/s (<=1.1m/s) LVOT VTI:      14.30 cm LVOT Diameter: 1.90 cm  (1.8-2.4cm) AoV Area,Vmax: 1.69 cm2 (2.5-4.5cm2)  RIGHT VENTRICLE: RV Basal 2.22 cm RV Mid   1.66 cm RV Major 7.1 cm TAPSE:   15.6 mm RV s'    0.15 m/s TRICUSPID VALVE/RVSP:                             Normal Ranges: Peak TR Velocity: 2.72 m/s RV Syst Pressure: 32.6 mmHg (< 30mmHg) PULMONIC VALVE:                      Normal Ranges: PV Max David: 0.7 m/s  (0.6-0.9m/s) PV Max P.1 mmHg  [id number] Jeancarlos Kerr MD Electronically signed on 10/20/2023 at 10:09:08 AM  ** Final **     XR chest 1 view    Result Date: 10/19/2023  Interpreted By:  Sampson Tubbs, STUDY: XR CHEST 1 VIEW;  10/19/2023 3:51 pm   INDICATION: Signs/Symptoms:ams.   COMPARISON: CT abdomen and pelvis dated 10/19/2020 through full CT scan chest in 10/1923. Chest x-ray from 2016.   ACCESSION NUMBER(S): [id number]   ORDERING CLINICIAN: EDWINA RUDD   TECHNIQUE: Single AP portable view of the chest was obtained.   FINDINGS:  MEDIASTINUM/ LUNGS/ CARLOS: Trace stable pleural scarring at the lateral right base. No cardiomegaly, vascular congestion, or pleural effusion. No abnormal opacity in either lung worrisome for tumor or pneumonia. No pneumothorax. No tracheal deviation. No abnormal hilar fullness or gross mass on either side.   BONES: No lytic or blastic destructive bone lesion.   UPPER ABDOMEN: Grossly intact.       Trace stable pleural scarring at the lateral right base. Otherwise, negative exam.   Signed by: Sampson Tubbs 10/19/2023 4:23 PM Dictation workstation:   [id number]    CT angio chest abdomen pelvis    Result Date: 10/19/2023  Interpreted By:  Jude Calhoun, STUDY: CT ANGIO CHEST ABDOMEN PELVIS;  10/19/2023 3:24 pm   INDICATION: Signs/Symptoms:AMS CP   COMPARISON: None.   ACCESSION NUMBER(S): [id number]   ORDERING CLINICIAN: EDWINA RUDD   TECHNIQUE: CT of the chest, abdomen, and pelvis was performed. Contiguous axial images were obtained at  5 mm slice thickness through the chest, and at  3 mm through the abdomen and pelvis. Coronal and sagittal MIP reconstructions were obtained and reviewed.. 80 cc Omnipaque 350 injected intravenously.   FINDINGS: CHEST:   No filling defects in the pulmonary arteries suggest pulmonary embolism. Intact thoracic aorta with no aneurysm or dissection. Trace pericardial fluid. No pleural effusion. No adenopathy. No pneumothorax. No pulmonary consolidation, mass or suspicious nodule. No acute osseous abnormality in the thoracic cage. Bone island in T3 vertebral body.   ABDOMEN AND PELVIS:   Vascular calcification is noted. No aneurysm or dissection in the abdominal aorta and its major branches. Diffuse hepatic steatosis. Unremarkable spleen, pancreas, adrenals, and bilateral kidneys. Mucosal hyperenhancement of stomach and duodenal is seen, suspicious for gastroduodenitis. Bowel loops nonobstructed. Normal appendix. No free air or free fluid. No adenopathy. Sections through the pelvis  demonstrate a grossly unremarkable urinary bladder. No adnexal mass.       No evidence of pulmonary embolism.   No thoracoabdominal aortic aneurysm or dissection.   No pneumonia.   Marked diffuse hepatic steatosis.   Questionable gastroduodenitis.   No evidence of bowel obstruction.   Normal appendix.   MACRO: None   Signed by: Jude Calhoun 10/19/2023 4:11 PM Dictation workstation:   [id number]    CT head wo IV contrast    Result Date: 10/19/2023  Interpreted By:  Anthony Reyes, STUDY: CT HEAD WO IV CONTRAST;  10/19/2023 3:23 pm   INDICATION: ams.   COMPARISON: None.   ACCESSION NUMBER(S): [id number]   ORDERING CLINICIAN: ANTHONY RUDD   TECHNIQUE: Contiguous unenhanced axial CT sections are performed from the skull base to the vertex.   FINDINGS: The osseous structures are intact. The visualized portions of the paranasal sinuses and mastoid air cells are clear.   The cortical sulci and CSF spaces are symmetric in appearance. There is no sign of parenchymal hematoma or dense extra-axial fluid collection. There is no edema or mass effect. The gray matter/white-matter differentiation is preserved.       No CT evidence of acute intracranial abnormality.   Signed by: Anthony Reyes 10/19/2023 3:32 PM Dictation workstation:   [id number]    Transthoracic Echo (TTE) Complete    Result Date: 10/20/2023    [facility], [address] [place] [id number] [phone] and                                 Fax [phone] TRANSTHORACIC ECHOCARDIOGRAM REPORT  Patient Name:     RENEE Diego Physician:   [id number] Jeancarlos Kerr MD Study Date:       10/20/2023          Ordering Provider:   [id number] MICHAEL MAZA MRN/PID:          [id number]            Fellow: Accession#:       [id number]        Nurse: Date of           1960 / 63      Sonographer:         Peter Edwards RDCS Birth/Age:        years Gender:           F                    Additional Staff: Height:           167.64 cm           Admit Date:          10/19/2023 Weight:           48.08 kg            Admission Status:    Inpatient - Routine BSA:              1.53 m2             Encounter#:          [id number]                                       Department Location: [facility] Blood Pressure: 86 /54 mmHg Study Type:    TRANSTHORACIC ECHO (TTE) COMPLETE Diagnosis/ICD: Cardiac murmur, unspecified-R01.1 CPT Code:      Echo Complete w Full Doppler-98771; Myocardial Strain                Imaging-[id number] Patient History: Pertinent History: Murmur. Study Detail: The following Echo studies were performed: 2D, M-Mode, Doppler,               color flow and Strain. Technically challenging study due to body               habitus and patient lying in supine position.  PHYSICIAN INTERPRETATION: Left Ventricle: The left ventricular systolic function is normal, with an estimated ejection fraction of 60-65%. There are no regional wall motion abnormalities. The left ventricular cavity size is decreased. Left ventricular diastolic filling was indeterminate. Left Atrium: The left atrium is normal in size. Right Ventricle: The right ventricle is normal in size. There is normal right ventricular global systolic function. RV anterior free wall appears thickened. Right Atrium: The right atrium is normal in size. Aortic Valve: The aortic valve is trileaflet. There is mild to moderate aortic valve thickening. There is no evidence of aortic valve regurgitation. The peak instantaneous gradient of the aortic valve is 9.1 mmHg. Mitral Valve: The mitral valve is moderately thickened. There is trace mitral valve regurgitation. Tricuspid Valve: The tricuspid valve is structurally normal. There is moderate to severe tricuspid regurgitation. The Doppler estimated RVSP is slightly elevated at 32.6 mmHg. Pulmonic Valve: The pulmonic valve is structurally normal. There is trace pulmonic valve regurgitation.  Pericardium: There is a small to moderate pericardial effusion posterior to the left ventricle. The effusion is circumferential. Aorta: The aortic root is normal. There is no dilatation of the ascending aorta. There is no dilatation of the aortic root. Systemic Veins: The inferior vena cava appears to be of normal size.  CONCLUSIONS:  1. Left ventricular systolic function is normal with a 60-65% estimated ejection fraction.  2. There is a small to moderate pericardial effusion.  3. The mitral valve is moderately thickened.  4. Moderate to severe tricuspid regurgitation visualized.  5. Slightly elevated RVSP.  6. Left ventricular cavity size is decreased. QUANTITATIVE DATA SUMMARY: 2D MEASUREMENTS:                          Normal Ranges: LAs:           2.70 cm   (2.7-4.0cm) IVSd:          0.89 cm   (0.6-1.1cm) LVPWd:         0.83 cm   (0.6-1.1cm) LVIDd:         3.53 cm   (3.9-5.9cm) LVIDs:         2.50 cm LV Mass Index: 55.6 g/m2 LV % FS        29.2 % LA VOLUME:                             Normal Ranges: LA Volume Index: 10.0 ml/m2 RA VOLUME BY A/L METHOD:                       Normal Ranges: RA Area A4C: 10.0 cm2 M-MODE MEASUREMENTS:                  Normal Ranges: Ao Root: 2.70 cm (2.0-3.7cm) LAs:     3.20 cm (2.7-4.0cm) AORTA MEASUREMENTS:                    Normal Ranges: Asc Ao, d: 2.60 cm (2.1-3.4cm) LV SYSTOLIC FUNCTION BY 2D PLANIMETRY (MOD):                     Normal Ranges: EF-A4C View: 68.6 % (>=55%) EF-A2C View: 53.5 % EF-Biplane:  61.3 % LV DIASTOLIC FUNCTION:                           Normal Ranges: MV Peak E:    0.63 m/s    (0.7-1.2 m/s) MV Peak A:    0.56 m/s    (0.42-0.7 m/s) E/A Ratio:    1.14        (1.0-2.2) MV lateral e' 0.07 m/s MV medial e'  0.10 m/s MV A Dur:     120.00 msec MITRAL VALVE:                 Normal Ranges: MV DT: 208 msec (150-240msec) AORTIC VALVE:                         Normal Ranges: AoV Vmax:      1.51 m/s (<=1.7m/s) AoV Peak P.1 mmHg (<20mmHg) LVOT Max David:  0.90 m/s  (<=1.1m/s) LVOT VTI:      14.30 cm LVOT Diameter: 1.90 cm  (1.8-2.4cm) AoV Area,Vmax: 1.69 cm2 (2.5-4.5cm2)  RIGHT VENTRICLE: RV Basal 2.22 cm RV Mid   1.66 cm RV Major 7.1 cm TAPSE:   15.6 mm RV s'    0.15 m/s TRICUSPID VALVE/RVSP:                             Normal Ranges: Peak TR Velocity: 2.72 m/s RV Syst Pressure: 32.6 mmHg (< 30mmHg) PULMONIC VALVE:                      Normal Ranges: PV Max David: 0.7 m/s  (0.6-0.9m/s) PV Max P.1 mmHg  [id number] Jeancarlos Kerr MD Electronically signed on 10/20/2023 at 10:09:08 AM  ** Final **     XR chest 1 view    Result Date: 10/19/2023  Interpreted By:  Sampson Tubbs, STUDY: XR CHEST 1 VIEW;  10/19/2023 3:51 pm   INDICATION: Signs/Symptoms:ams.   COMPARISON: CT abdomen and pelvis dated 10/19/2020 through full CT scan chest in 10/1923. Chest x-ray from 2016.   ACCESSION NUMBER(S): [id number]   ORDERING CLINICIAN: EDWINA RUDD   TECHNIQUE: Single AP portable view of the chest was obtained.   FINDINGS: MEDIASTINUM/ LUNGS/ CARLOS: Trace stable pleural scarring at the lateral right base. No cardiomegaly, vascular congestion, or pleural effusion. No abnormal opacity in either lung worrisome for tumor or pneumonia. No pneumothorax. No tracheal deviation. No abnormal hilar fullness or gross mass on either side.   BONES: No lytic or blastic destructive bone lesion.   UPPER ABDOMEN: Grossly intact.       Trace stable pleural scarring at the lateral right base. Otherwise, negative exam.   Signed by: Sampson Tubbs 10/19/2023 4:23 PM Dictation workstation:   [id number]    CT angio chest abdomen pelvis    Result Date: 10/19/2023  Interpreted By:  Jdue Clahoun, STUDY: CT ANGIO CHEST ABDOMEN PELVIS;  10/19/2023 3:24 pm   INDICATION: Signs/Symptoms:AMS CP   COMPARISON: None.   ACCESSION NUMBER(S): [id number]   ORDERING CLINICIAN: EDWINA RUDD   TECHNIQUE: CT of the chest, abdomen, and pelvis was performed. Contiguous axial images were obtained at  5 mm slice thickness  through the chest, and at  3 mm through the abdomen and pelvis. Coronal and sagittal MIP reconstructions were obtained and reviewed.. 80 cc Omnipaque 350 injected intravenously.   FINDINGS: CHEST:   No filling defects in the pulmonary arteries suggest pulmonary embolism. Intact thoracic aorta with no aneurysm or dissection. Trace pericardial fluid. No pleural effusion. No adenopathy. No pneumothorax. No pulmonary consolidation, mass or suspicious nodule. No acute osseous abnormality in the thoracic cage. Bone island in T3 vertebral body.   ABDOMEN AND PELVIS:   Vascular calcification is noted. No aneurysm or dissection in the abdominal aorta and its major branches. Diffuse hepatic steatosis. Unremarkable spleen, pancreas, adrenals, and bilateral kidneys. Mucosal hyperenhancement of stomach and duodenal is seen, suspicious for gastroduodenitis. Bowel loops nonobstructed. Normal appendix. No free air or free fluid. No adenopathy. Sections through the pelvis demonstrate a grossly unremarkable urinary bladder. No adnexal mass.       No evidence of pulmonary embolism.   No thoracoabdominal aortic aneurysm or dissection.   No pneumonia.   Marked diffuse hepatic steatosis.   Questionable gastroduodenitis.   No evidence of bowel obstruction.   Normal appendix.   MACRO: None   Signed by: Jude Calhoun 10/19/2023 4:11 PM Dictation workstation:   [id number]    CT head wo IV contrast    Result Date: 10/19/2023  Interpreted By:  Anthony Reyes, STUDY: CT HEAD WO IV CONTRAST;  10/19/2023 3:23 pm   INDICATION: ams.   COMPARISON: None.   ACCESSION NUMBER(S): [id number]   ORDERING CLINICIAN: ANTHONY RUDD   TECHNIQUE: Contiguous unenhanced axial CT sections are performed from the skull base to the vertex.   FINDINGS: The osseous structures are intact. The visualized portions of the paranasal sinuses and mastoid air cells are clear.   The cortical sulci and CSF spaces are symmetric in appearance. There is no sign of  parenchymal hematoma or dense extra-axial fluid collection. There is no edema or mass effect. The gray matter/white-matter differentiation is preserved.       No CT evidence of acute intracranial abnormality.   Signed by: Anthony Reyes 10/19/2023 3:32 PM Dictation workstation:   [id number]             Assessment/Plan   This patient currently has cardiac telemetry ordered; if you would like to modify or discontinue the telemetry order, click here to go to the orders activity to modify/discontinue the order.    Principal Problem:    DKA, type 2, not at goal (CMS/HCC)  Assessment + Plan:   Ivelisse Paulino is a 63-year-old female with a past medical history of type II DM presenting with DKA episode.  Patient was found to have some abdominal pain and nausea along with chest discomfort.  Patient has sustained ketoacidosis with elevated ketones and elevated anion gap with elevated blood sugars.  Patient is being admitted to ICU for further medical management     Neuro:  #Acute metabolic encephalopathy in setting of DKA  - DKA episode resolved.  Mentation improving  Alert and oriented x3  - Monitor vitals  - Maintain sleep hygiene  - Monitor for ICU delirium     CV:  #HTN  #HLD  - Patient not on any antihypertensives  - Continue to monitor.  May start her on medications during this admission  #Systolic murmur  - Investigate with blood cultures and echo.  -Echo which does not show any evidence of endocarditis EF 66 5%     Respiratory:  -No acute respiratory distress.  Patient does not require any oxygen     GI:  - N.p.o. while patient is being treated for DKA  - Once patient can eat and drink, can then discontinue D5W  -Protonix for stress ulcer prophylaxis     Endo:  #DKA  #Type II DM  - DKA episode resolved.  -Patient eating and drinking.  Can discontinue D5W.  - Continue subcutaneous insulin.  12 units bolus Lantus and 4 units lispro at mealtimes 3 times daily  - Once patient is able to eat and drink, can  discontinue D5W  -Monitor BMP every 4 hours.  Check sugars every hour.  Ensure resolution of anion gap.  - Patient does not regularly take insulin.  Unsure if patient has insulin prescribed.  Unsure if patient has endocrinologist.  Patient takes metformin however not regulary  - Consult endocrinology for further Arash  - HbA1c 15.3 indicates poorly controlled type II DM.  Patient needs to be on insulin regimen before being discharged     Renal:  #KLEBER  #Hyponatremia-resolved  - Likely in the setting of DKA, dehydration-DKA episode resolved  - Monitor BMP and optimize electrolytes  - Hyponatremia likely in the setting of dehydration.  Continue to monitor sodium     Hematological:  #Leukocytosis  #Neutrophilia  #Macrocytic anemia  - No indication of any infection.  Patient afebrile  - Monitor CBC     ID:  #Leukocytosis  #Neutrophilia  -Systolic murmur present.  Afebrile.  Ordered blood cultures and echo  - Echo shows 60 to 65% ejection fraction, moderate to severe tricuspid regurgitation, mitral valve is moderately thickened, no evidence of endocarditis  - Obtain CRP, ESR, procalcitonin     Vent/O2: None  Lines/Devices: IV peripheral  Drips: IV  mils an hour  ATBx: None  Fluids: IV fluids LR  Diet: Regular diet  PPX: Protonix  Code status: Full code  Dispo: ICU      Albert Cortes MD  PGY1 Internal Medicine

## 2023-10-21 LAB
ANION GAP SERPL CALC-SCNC: 9 MMOL/L (ref 10–20)
BUN SERPL-MCNC: 21 MG/DL (ref 6–23)
CALCIUM SERPL-MCNC: 9.4 MG/DL (ref 8.6–10.3)
CHLORIDE SERPL-SCNC: 108 MMOL/L (ref 98–107)
CO2 SERPL-SCNC: 24 MMOL/L (ref 21–32)
CREAT SERPL-MCNC: 1.08 MG/DL (ref 0.5–1.05)
ERYTHROCYTE [DISTWIDTH] IN BLOOD BY AUTOMATED COUNT: 13.6 % (ref 11.5–14.5)
GFR SERPL CREATININE-BSD FRML MDRD: 58 ML/MIN/1.73M*2
GLUCOSE BLD MANUAL STRIP-MCNC: 177 MG/DL (ref 74–99)
GLUCOSE BLD MANUAL STRIP-MCNC: 183 MG/DL (ref 74–99)
GLUCOSE BLD MANUAL STRIP-MCNC: 200 MG/DL (ref 74–99)
GLUCOSE BLD MANUAL STRIP-MCNC: 228 MG/DL (ref 74–99)
GLUCOSE BLD MANUAL STRIP-MCNC: 65 MG/DL (ref 74–99)
GLUCOSE SERPL-MCNC: 204 MG/DL (ref 74–99)
HCT VFR BLD AUTO: 34 % (ref 36–46)
HGB BLD-MCNC: 11.4 G/DL (ref 12–16)
HOLD SPECIMEN: NORMAL
MCH RBC QN AUTO: 34.1 PG (ref 26–34)
MCHC RBC AUTO-ENTMCNC: 33.5 G/DL (ref 32–36)
MCV RBC AUTO: 102 FL (ref 80–100)
NRBC BLD-RTO: 0 /100 WBCS (ref 0–0)
PLATELET # BLD AUTO: 223 X10*3/UL (ref 150–450)
PMV BLD AUTO: 10.3 FL (ref 7.5–11.5)
POTASSIUM SERPL-SCNC: 3.8 MMOL/L (ref 3.5–5.3)
PROCALCITONIN SERPL-MCNC: 6.85 NG/ML
RBC # BLD AUTO: 3.34 X10*6/UL (ref 4–5.2)
SODIUM SERPL-SCNC: 137 MMOL/L (ref 136–145)
VANCOMYCIN SERPL-MCNC: 15.4 UG/ML (ref 5–20)
WBC # BLD AUTO: 11.1 X10*3/UL (ref 4.4–11.3)

## 2023-10-21 PROCEDURE — 96372 THER/PROPH/DIAG INJ SC/IM: CPT

## 2023-10-21 PROCEDURE — 86341 ISLET CELL ANTIBODY: CPT | Performed by: INTERNAL MEDICINE

## 2023-10-21 PROCEDURE — 2500000004 HC RX 250 GENERAL PHARMACY W/ HCPCS (ALT 636 FOR OP/ED): Performed by: STUDENT IN AN ORGANIZED HEALTH CARE EDUCATION/TRAINING PROGRAM

## 2023-10-21 PROCEDURE — 2020000001 HC ICU ROOM DAILY

## 2023-10-21 PROCEDURE — 84681 ASSAY OF C-PEPTIDE: CPT | Mod: CMCLAB,PARLAB | Performed by: INTERNAL MEDICINE

## 2023-10-21 PROCEDURE — 36415 COLL VENOUS BLD VENIPUNCTURE: CPT

## 2023-10-21 PROCEDURE — 2500000004 HC RX 250 GENERAL PHARMACY W/ HCPCS (ALT 636 FOR OP/ED)

## 2023-10-21 PROCEDURE — 2500000005 HC RX 250 GENERAL PHARMACY W/O HCPCS: Performed by: STUDENT IN AN ORGANIZED HEALTH CARE EDUCATION/TRAINING PROGRAM

## 2023-10-21 PROCEDURE — 82947 ASSAY GLUCOSE BLOOD QUANT: CPT

## 2023-10-21 PROCEDURE — 96372 THER/PROPH/DIAG INJ SC/IM: CPT | Performed by: STUDENT IN AN ORGANIZED HEALTH CARE EDUCATION/TRAINING PROGRAM

## 2023-10-21 PROCEDURE — 2500000004 HC RX 250 GENERAL PHARMACY W/ HCPCS (ALT 636 FOR OP/ED): Performed by: HOSPITALIST

## 2023-10-21 PROCEDURE — 36415 COLL VENOUS BLD VENIPUNCTURE: CPT | Performed by: HOSPITALIST

## 2023-10-21 PROCEDURE — 80048 BASIC METABOLIC PNL TOTAL CA: CPT

## 2023-10-21 PROCEDURE — 2500000002 HC RX 250 W HCPCS SELF ADMINISTERED DRUGS (ALT 637 FOR MEDICARE OP, ALT 636 FOR OP/ED)

## 2023-10-21 PROCEDURE — 2500000001 HC RX 250 WO HCPCS SELF ADMINISTERED DRUGS (ALT 637 FOR MEDICARE OP): Performed by: STUDENT IN AN ORGANIZED HEALTH CARE EDUCATION/TRAINING PROGRAM

## 2023-10-21 PROCEDURE — 83519 RIA NONANTIBODY: CPT | Performed by: INTERNAL MEDICINE

## 2023-10-21 PROCEDURE — 80202 ASSAY OF VANCOMYCIN: CPT

## 2023-10-21 PROCEDURE — 85027 COMPLETE CBC AUTOMATED: CPT

## 2023-10-21 PROCEDURE — 2500000002 HC RX 250 W HCPCS SELF ADMINISTERED DRUGS (ALT 637 FOR MEDICARE OP, ALT 636 FOR OP/ED): Performed by: STUDENT IN AN ORGANIZED HEALTH CARE EDUCATION/TRAINING PROGRAM

## 2023-10-21 RX ORDER — MIDODRINE HYDROCHLORIDE 5 MG/1
5 TABLET ORAL EVERY 8 HOURS
Status: DISCONTINUED | OUTPATIENT
Start: 2023-10-21 | End: 2023-10-22

## 2023-10-21 RX ORDER — VANCOMYCIN HYDROCHLORIDE 1 G/200ML
1000 INJECTION, SOLUTION INTRAVENOUS EVERY 24 HOURS
Status: DISCONTINUED | OUTPATIENT
Start: 2023-10-21 | End: 2023-10-22

## 2023-10-21 RX ADMIN — VANCOMYCIN HYDROCHLORIDE 1000 MG: 1 INJECTION, SOLUTION INTRAVENOUS at 20:31

## 2023-10-21 RX ADMIN — MEROPENEM 1 G: 1 INJECTION, POWDER, FOR SOLUTION INTRAVENOUS at 08:23

## 2023-10-21 RX ADMIN — MEROPENEM 1 G: 1 INJECTION, POWDER, FOR SOLUTION INTRAVENOUS at 20:32

## 2023-10-21 RX ADMIN — HEPARIN SODIUM 5000 UNITS: 5000 INJECTION INTRAVENOUS; SUBCUTANEOUS at 05:57

## 2023-10-21 RX ADMIN — INSULIN LISPRO 4 UNITS: 100 INJECTION, SOLUTION INTRAVENOUS; SUBCUTANEOUS at 08:20

## 2023-10-21 RX ADMIN — MIDODRINE HYDROCHLORIDE 5 MG: 5 TABLET ORAL at 17:33

## 2023-10-21 RX ADMIN — INSULIN GLARGINE 12 UNITS: 100 INJECTION, SOLUTION SUBCUTANEOUS at 08:19

## 2023-10-21 RX ADMIN — Medication 0.16 MCG/KG/MIN: at 03:52

## 2023-10-21 RX ADMIN — HEPARIN SODIUM 5000 UNITS: 5000 INJECTION INTRAVENOUS; SUBCUTANEOUS at 20:30

## 2023-10-21 RX ADMIN — MIDODRINE HYDROCHLORIDE 5 MG: 5 TABLET ORAL at 13:10

## 2023-10-21 RX ADMIN — INSULIN LISPRO 4 UNITS: 100 INJECTION, SOLUTION INTRAVENOUS; SUBCUTANEOUS at 08:21

## 2023-10-21 RX ADMIN — INSULIN LISPRO 4 UNITS: 100 INJECTION, SOLUTION INTRAVENOUS; SUBCUTANEOUS at 17:27

## 2023-10-21 RX ADMIN — INSULIN LISPRO 4 UNITS: 100 INJECTION, SOLUTION INTRAVENOUS; SUBCUTANEOUS at 20:32

## 2023-10-21 RX ADMIN — SODIUM CHLORIDE, POTASSIUM CHLORIDE, SODIUM LACTATE AND CALCIUM CHLORIDE 100 ML/HR: 600; 310; 30; 20 INJECTION, SOLUTION INTRAVENOUS at 04:16

## 2023-10-21 ASSESSMENT — COGNITIVE AND FUNCTIONAL STATUS - GENERAL
CLIMB 3 TO 5 STEPS WITH RAILING: A LITTLE
DAILY ACTIVITIY SCORE: 24
MOBILITY SCORE: 23

## 2023-10-21 ASSESSMENT — PAIN SCALES - GENERAL
PAINLEVEL_OUTOF10: 0 - NO PAIN
PAINLEVEL_OUTOF10: 1
PAINLEVEL_OUTOF10: 0 - NO PAIN
PAINLEVEL_OUTOF10: 0 - NO PAIN

## 2023-10-21 ASSESSMENT — PAIN - FUNCTIONAL ASSESSMENT
PAIN_FUNCTIONAL_ASSESSMENT: 0-10

## 2023-10-21 NOTE — PROGRESS NOTES
"    Endocrinology Inpatient Consult Progress Note     PATIENT NAME: Ivelisse Paulino  MRN: [id number]  DATE: 10/21/2023    CONSULTING PHYSICIAN: Dr. Rush  REASON FOR CONSULT: DKA. T1DM.      Interval Events     Mental status is much improved.  Patient was seen sitting in the chair this morning.  She is tolerating all of her meals.  No nausea or vomiting.  Patient reports that she takes only metformin at home.  She does report worsening weight loss in the last couple months.  She also states that she has worsening polyuria and polydipsia.  Worsening nocturia.  Checks her sugars seldomly and states that they are in the 300 mg/dL range.      Physical Examination     BP 96/53   Pulse 81   Temp 36.8 °C (98.2 °F) (Temporal)   Resp 15   Ht 1.7 m (5' 6.93\")   Wt 48.5 kg (106 lb 14.8 oz)   SpO2 98%   BMI 16.78 kg/m²   No acute distress.  Alert and oriented x3.  RRR.  Nonlabored respiration.  Soft nontender nondistended abdomen.  No pedal edema bilaterally.  Appropriate affect.      Medications     Reviewed MAR       Data     Recent Labs and Imaging Reviewed    Date  PreBreakfast Pre Lunch Pre Dinner Bedtime 3AM    10/20 187 (G12, H4+4) 71 218 (H4+8) 127    10/21 228 (G12, H4+4)                           Assessment / Plan        # DM NOS:   HbA1c (10/23): 15.3  Home Regimen: Metformin.  Presented in [place] DKA.    -Continue glargine 12 units in the morning  -Continue lispro 4 units with each meal  -Continue lispro sliding scale with meals and at bedtime  -We will need diabetes education and starting on a multiple daily injection program.  -Obtain C Peptide and T1DM Abs  -No metformin on discharge.  -Explained to the patient she has been a multiple daily injection program.     # KLEBER: Improving isotonic fluid administration.  Likely prerenal in nature with osmotic diuresis from hyperglycemia.     # Hypothyroidism: Maintained on levothyroxine.  Reviewed TSH was only mildly elevated to 4.78 in the setting of acute illness.  " No intervention at this time.  Continue same levothyroxine dose.     # NAFLD: Incidentally seen on cross-sectional imaging.  She surely does not have the phenotype for the same.     #Encephalopathy: Suspect this is secondary to her metabolic disturbances as mentioned above.  Let us try to achieve euglycemia and see if this persist.  Frequent reorientation.    #Hypertension: Reviewed urine output creatinine ratio was found to be 113.8.  This is the setting of an KLEBER.  Perhaps we can recheck this later once her renal function improves.     # Dyslipidemia: We will explore more when she is more clinically stable.    # Severe Protein Calorie Malnutrion: There was question that she could have adrenal insufficiency given her likely type 1 diabetes mellitus as well as SLE.  We did obtain a morning cortisol yesterday which was found to be 36.3.  This is reassuring against adrenal insufficiency.  No indication for glucocorticoids.  Nutrition following.       Donny Garcia DO  Endocrinology, Diabetes, and Metabolism    Available via [employer] Messenger

## 2023-10-21 NOTE — PROGRESS NOTES
"Vancomycin Dosing by Pharmacy- FOLLOW UP    Ivelisse Paulino is a 63 y.o. year old female who Pharmacy has been consulted for vancomycin dosing for other , shock  . Based on the patient's indication and renal status this patient is being dosed based on a goal AUC of 400-600.     Renal function is currently stable.    Current vancomycin dose: 750 mg given every 24 hours    Estimated vancomycin AUC on current dose: 400 mg/L.hr     Visit Vitals  BP 96/53   Pulse 81   Temp 36.8 °C (98.2 °F) (Temporal)   Resp 15        Lab Results   Component Value Date    CREATININE 1.08 (H) 10/21/2023    CREATININE 1.14 (H) 10/20/2023    CREATININE 1.53 (H) 10/20/2023    CREATININE 1.48 (H) 10/20/2023        Patient weight is No results found for: \"PTWEIGHT\"    No results found for: \"CULTURE\"     I/O last 3 completed shifts:  In: 3598.8 (74.2 mL/kg) [I.V.:2748.8 (56.7 mL/kg); IV Piggyback:850]  Out: 4575 (94.3 mL/kg) [Urine:4575 (2.6 mL/kg/hr)]  Weight: 48.5 kg   [unfilled]    Lab Results   Component Value Date    PATIENTTEMP 37.0 10/20/2023    PATIENTTEMP 37.0 10/19/2023    PATIENTTEMP 37.0 10/19/2023        Assessment/Plan    Below goal AUC. Orders placed for new vancomcyin regimen of 1000 every 24 hours to begin at 2100.     This dosing regimen is predicted by [employer] to result in the following pharmacokinetic parameters:  Exposure target: AUC24 (range)400-600 mg/L.hr   AUC24,ss: 522 mg/L.hr  Probability of AUC24 > 400: 92 %  Ctrough,ss: 15.3 mg/L  Probability of Ctrough,ss > 20: 16 %  Probability of nephrotoxicity (Lodise XI 2009): 11 %  The next level will be obtained on 10/22 at 0500. May be obtained sooner if clinically indicated.   Will continue to monitor renal function daily while on vancomycin and order serum creatinine at least every 48 hours if not already ordered.  Follow for continued vancomycin needs, clinical response, and signs/symptoms of toxicity.       Dinaa Toth, PharmD           "

## 2023-10-21 NOTE — PROGRESS NOTES
Ivelisse Plummer is a 63 y.o. female on day 2 of admission presenting with DKA, type 2, not at goal (CMS/HCC).      Subjective   Patient seen and examined at bedside.  Patient sitting up.  Comfortable and alert x3.  Orientated.  Patient not in any acute distress.  No acute events overnight.  No urinary symptoms.  Normal bowel movements.  Patient denies any abdominal pain, nausea, vomiting.  Afebrile.  Eating and drinking okay.       Objective     Last Recorded Vitals  BP 97/60   Pulse 98   Temp 36.8 °C (98.2 °F) (Temporal)   Resp (!) 27   Wt 48.5 kg (106 lb 14.8 oz)   SpO2 99%   Intake/Output last 3 Shifts:    Intake/Output Summary (Last 24 hours) at 10/21/2023 1437  Last data filed at 10/21/2023 1200  Gross per 24 hour   Intake 3799.3 ml   Output 2450 ml   Net 1349.3 ml         Admission Weight  Weight: 63.5 kg (140 lb) (10/19/23 1455)    Daily Weight  10/20/23 : 48.5 kg (106 lb 14.8 oz)    Image Results  Transthoracic Echo (TTE) [facility], [address] [place] [address] [id number] [phone] and                                  Fax [phone]    TRANSTHORACIC ECHOCARDIOGRAM REPORT       Patient Name:     IVELISSE PLUMMER    Reading Physician:   [id number] Jeancarlos Kerr MD  Study Date:       10/20/2023          Ordering Provider:   [id number] MICHAEL MAZA  MRN/PID:          [id number]            Fellow:  Accession#:       [id number]        Nurse:  Date of           1960 / 63      Sonographer:         Peter Edwards RDCS  Birth/Age:        years  Gender:           F                   Additional Staff:  Height:           167.64 cm           Admit Date:          10/19/2023  Weight:           48.08 kg            Admission Status:    Inpatient - Routine  BSA:              1.53 m2             Encounter#:          [id number]                                        Department Location: [facility]  Blood Pressure: 86 /54 mmHg    Study  Type:    TRANSTHORACIC ECHO (TTE) COMPLETE  Diagnosis/ICD: Cardiac murmur, unspecified-R01.1  CPT Code:      Echo Complete w Full Doppler-74810; Myocardial Strain                 Imaging-[id number]    Patient History:  Pertinent History: Murmur.    Study Detail: The following Echo studies were performed: 2D, M-Mode, Doppler,                color flow and Strain. Technically challenging study due to body                habitus and patient lying in supine position.       PHYSICIAN INTERPRETATION:  Left Ventricle: The left ventricular systolic function is normal, with an estimated ejection fraction of 60-65%. There are no regional wall motion abnormalities. The left ventricular cavity size is decreased. Left ventricular diastolic filling was indeterminate.  Left Atrium: The left atrium is normal in size.  Right Ventricle: The right ventricle is normal in size. There is normal right ventricular global systolic function. RV anterior free wall appears thickened.  Right Atrium: The right atrium is normal in size.  Aortic Valve: The aortic valve is trileaflet. There is mild to moderate aortic valve thickening. There is no evidence of aortic valve regurgitation. The peak instantaneous gradient of the aortic valve is 9.1 mmHg.  Mitral Valve: The mitral valve is moderately thickened. There is trace mitral valve regurgitation.  Tricuspid Valve: The tricuspid valve is structurally normal. There is moderate to severe tricuspid regurgitation. The Doppler estimated RVSP is slightly elevated at 32.6 mmHg.  Pulmonic Valve: The pulmonic valve is structurally normal. There is trace pulmonic valve regurgitation.  Pericardium: There is a small to moderate pericardial effusion posterior to the left ventricle. The effusion is circumferential.  Aorta: The aortic root is normal. There is no dilatation of the ascending aorta. There is no dilatation of the aortic root.  Systemic Veins: The inferior vena cava appears to be of normal size.        CONCLUSIONS:   1. Left ventricular systolic function is normal with a 60-65% estimated ejection fraction.   2. There is a small to moderate pericardial effusion.   3. The mitral valve is moderately thickened.   4. Moderate to severe tricuspid regurgitation visualized.   5. Slightly elevated RVSP.   6. Left ventricular cavity size is decreased.    QUANTITATIVE DATA SUMMARY:  2D MEASUREMENTS:                           Normal Ranges:  LAs:           2.70 cm   (2.7-4.0cm)  IVSd:          0.89 cm   (0.6-1.1cm)  LVPWd:         0.83 cm   (0.6-1.1cm)  LVIDd:         3.53 cm   (3.9-5.9cm)  LVIDs:         2.50 cm  LV Mass Index: 55.6 g/m2  LV % FS        29.2 %    LA VOLUME:                              Normal Ranges:  LA Volume Index: 10.0 ml/m2    RA VOLUME BY A/L METHOD:                        Normal Ranges:  RA Area A4C: 10.0 cm2    M-MODE MEASUREMENTS:                   Normal Ranges:  Ao Root: 2.70 cm (2.0-3.7cm)  LAs:     3.20 cm (2.7-4.0cm)    AORTA MEASUREMENTS:                     Normal Ranges:  Asc Ao, d: 2.60 cm (2.1-3.4cm)    LV SYSTOLIC FUNCTION BY 2D PLANIMETRY (MOD):                      Normal Ranges:  EF-A4C View: 68.6 % (>=55%)  EF-A2C View: 53.5 %  EF-Biplane:  61.3 %    LV DIASTOLIC FUNCTION:                            Normal Ranges:  MV Peak E:    0.63 m/s    (0.7-1.2 m/s)  MV Peak A:    0.56 m/s    (0.42-0.7 m/s)  E/A Ratio:    1.14        (1.0-2.2)  MV lateral e' 0.07 m/s  MV medial e'  0.10 m/s  MV A Dur:     120.00 msec    MITRAL VALVE:                  Normal Ranges:  MV DT: 208 msec (150-240msec)    AORTIC VALVE:                          Normal Ranges:  AoV Vmax:      1.51 m/s (<=1.7m/s)  AoV Peak P.1 mmHg (<20mmHg)  LVOT Max David:  0.90 m/s (<=1.1m/s)  LVOT VTI:      14.30 cm  LVOT Diameter: 1.90 cm  (1.8-2.4cm)  AoV Area,Vmax: 1.69 cm2 (2.5-4.5cm2)       RIGHT VENTRICLE:  RV Basal 2.22 cm  RV Mid   1.66 cm  RV Major 7.1 cm  TAPSE:   15.6 mm  RV s'    0.15 m/s    TRICUSPID VALVE/RVSP:                               Normal Ranges:  Peak TR Velocity: 2.72 m/s  RV Syst Pressure: 32.6 mmHg (< 30mmHg)    PULMONIC VALVE:                       Normal Ranges:  PV Max David: 0.7 m/s  (0.6-0.9m/s)  PV Max P.1 mmHg       [id number] Jeancarlos Kerr MD  Electronically signed on 10/20/2023 at 10:09:08 AM       ** Final **      Physical Exam  Physical Exam:  General: Alert and orientated x3.  Patient is calm and  HEENT:  Normocephalic, atraumatic, mucus membranes moist.  Smells of ketones on breath  Neck:  Trachea midline.  No JVD.    Chest:  Clear to auscultation bilaterally. No wheezes, rales, or rhonchi.  CV:  Regular rate and rhythm.  Positive S1/S2.   Abdomen: Soft, diffuse tenderness.  Bowel sounds present.  Extremities:  No lower extremity edema or cyanosis.   Neurological:  AAOx3. No focal deficits.  Skin:  Warm and dry.   Relevant Results      Results for orders placed or performed during the hospital encounter of 10/19/23 (from the past 24 hour(s))   POCT GLUCOSE   Result Value Ref Range    POCT Glucose 218 (H) 74 - 99 mg/dL   Basic metabolic panel   Result Value Ref Range    Glucose 231 (H) 74 - 99 mg/dL    Sodium 135 (L) 136 - 145 mmol/L    Potassium 3.7 3.5 - 5.3 mmol/L    Chloride 106 98 - 107 mmol/L    Bicarbonate 24 21 - 32 mmol/L    Anion Gap 9 (L) 10 - 20 mmol/L    Urea Nitrogen 26 (H) 6 - 23 mg/dL    Creatinine 1.14 (H) 0.50 - 1.05 mg/dL    eGFR 54 (L) >60 mL/min/1.73m*2    Calcium 9.6 8.6 - 10.3 mg/dL   Acute Toxicology Panel, Blood   Result Value Ref Range    Acetaminophen <10.0 10.0 - 30.0 ug/mL    Salicylate  <3 4 - 20 mg/dL    Alcohol <10 <=10 mg/dL   Sedimentation rate, automated   Result Value Ref Range    Sedimentation Rate 21 0 - 30 mm/h   POCT GLUCOSE   Result Value Ref Range    POCT Glucose 127 (H) 74 - 99 mg/dL   POCT GLUCOSE   Result Value Ref Range    POCT Glucose 122 (H) 74 - 99 mg/dL   POCT GLUCOSE   Result Value Ref Range    POCT Glucose 200 (H) 74 - 99 mg/dL   CBC   Result Value Ref  Range    WBC 11.1 4.4 - 11.3 x10*3/uL    nRBC 0.0 0.0 - 0.0 /100 WBCs    RBC 3.34 (L) 4.00 - 5.20 x10*6/uL    Hemoglobin 11.4 (L) 12.0 - 16.0 g/dL    Hematocrit 34.0 (L) 36.0 - 46.0 %     (H) 80 - 100 fL    MCH 34.1 (H) 26.0 - 34.0 pg    MCHC 33.5 32.0 - 36.0 g/dL    RDW 13.6 11.5 - 14.5 %    Platelets 223 150 - 450 x10*3/uL    MPV 10.3 7.5 - 11.5 fL   Basic metabolic panel   Result Value Ref Range    Glucose 204 (H) 74 - 99 mg/dL    Sodium 137 136 - 145 mmol/L    Potassium 3.8 3.5 - 5.3 mmol/L    Chloride 108 (H) 98 - 107 mmol/L    Bicarbonate 24 21 - 32 mmol/L    Anion Gap 9 (L) 10 - 20 mmol/L    Urea Nitrogen 21 6 - 23 mg/dL    Creatinine 1.08 (H) 0.50 - 1.05 mg/dL    eGFR 58 (L) >60 mL/min/1.73m*2    Calcium 9.4 8.6 - 10.3 mg/dL   Vancomycin   Result Value Ref Range    Vancomycin 15.4 5.0 - 20.0 ug/mL   SST TOP   Result Value Ref Range    Extra Tube Hold for add-ons.    POCT GLUCOSE   Result Value Ref Range    POCT Glucose 228 (H) 74 - 99 mg/dL   POCT GLUCOSE   Result Value Ref Range    POCT Glucose 65 (L) 74 - 99 mg/dL    Transthoracic Echo (TTE) Complete    Result Date: 10/20/2023    [facility], [address] [place] [id number] [phone] and                                 Fax [phone] TRANSTHORACIC ECHOCARDIOGRAM REPORT  Patient Name:     RENEE Diego Physician:   [id number] Jeancarlos Kerr MD Study Date:       10/20/2023          Ordering Provider:   [id number] MICHAEL MAZA MRN/PID:          [id number]            Fellow: Accession#:       [id number]        Nurse: Date of           1960 / 63      Sonographer:         Peter Edwards RDCS Birth/Age:        years Gender:           F                   Additional Staff: Height:           167.64 cm           Admit Date:          10/19/2023 Weight:           48.08 kg            Admission Status:    Inpatient - Routine BSA:              1.53 m2             Encounter#:           [id number]                                       Department Location: [facility] Blood Pressure: 86 /54 mmHg Study Type:    TRANSTHORACIC ECHO (TTE) COMPLETE Diagnosis/ICD: Cardiac murmur, unspecified-R01.1 CPT Code:      Echo Complete w Full Doppler-71207; Myocardial Strain                Imaging-[id number] Patient History: Pertinent History: Murmur. Study Detail: The following Echo studies were performed: 2D, M-Mode, Doppler,               color flow and Strain. Technically challenging study due to body               habitus and patient lying in supine position.  PHYSICIAN INTERPRETATION: Left Ventricle: The left ventricular systolic function is normal, with an estimated ejection fraction of 60-65%. There are no regional wall motion abnormalities. The left ventricular cavity size is decreased. Left ventricular diastolic filling was indeterminate. Left Atrium: The left atrium is normal in size. Right Ventricle: The right ventricle is normal in size. There is normal right ventricular global systolic function. RV anterior free wall appears thickened. Right Atrium: The right atrium is normal in size. Aortic Valve: The aortic valve is trileaflet. There is mild to moderate aortic valve thickening. There is no evidence of aortic valve regurgitation. The peak instantaneous gradient of the aortic valve is 9.1 mmHg. Mitral Valve: The mitral valve is moderately thickened. There is trace mitral valve regurgitation. Tricuspid Valve: The tricuspid valve is structurally normal. There is moderate to severe tricuspid regurgitation. The Doppler estimated RVSP is slightly elevated at 32.6 mmHg. Pulmonic Valve: The pulmonic valve is structurally normal. There is trace pulmonic valve regurgitation. Pericardium: There is a small to moderate pericardial effusion posterior to the left ventricle. The effusion is circumferential. Aorta: The aortic root is normal. There is no dilatation of the ascending aorta. There is no dilatation of  the aortic root. Systemic Veins: The inferior vena cava appears to be of normal size.  CONCLUSIONS:  1. Left ventricular systolic function is normal with a 60-65% estimated ejection fraction.  2. There is a small to moderate pericardial effusion.  3. The mitral valve is moderately thickened.  4. Moderate to severe tricuspid regurgitation visualized.  5. Slightly elevated RVSP.  6. Left ventricular cavity size is decreased. QUANTITATIVE DATA SUMMARY: 2D MEASUREMENTS:                          Normal Ranges: LAs:           2.70 cm   (2.7-4.0cm) IVSd:          0.89 cm   (0.6-1.1cm) LVPWd:         0.83 cm   (0.6-1.1cm) LVIDd:         3.53 cm   (3.9-5.9cm) LVIDs:         2.50 cm LV Mass Index: 55.6 g/m2 LV % FS        29.2 % LA VOLUME:                             Normal Ranges: LA Volume Index: 10.0 ml/m2 RA VOLUME BY A/L METHOD:                       Normal Ranges: RA Area A4C: 10.0 cm2 M-MODE MEASUREMENTS:                  Normal Ranges: Ao Root: 2.70 cm (2.0-3.7cm) LAs:     3.20 cm (2.7-4.0cm) AORTA MEASUREMENTS:                    Normal Ranges: Asc Ao, d: 2.60 cm (2.1-3.4cm) LV SYSTOLIC FUNCTION BY 2D PLANIMETRY (MOD):                     Normal Ranges: EF-A4C View: 68.6 % (>=55%) EF-A2C View: 53.5 % EF-Biplane:  61.3 % LV DIASTOLIC FUNCTION:                           Normal Ranges: MV Peak E:    0.63 m/s    (0.7-1.2 m/s) MV Peak A:    0.56 m/s    (0.42-0.7 m/s) E/A Ratio:    1.14        (1.0-2.2) MV lateral e' 0.07 m/s MV medial e'  0.10 m/s MV A Dur:     120.00 msec MITRAL VALVE:                 Normal Ranges: MV DT: 208 msec (150-240msec) AORTIC VALVE:                         Normal Ranges: AoV Vmax:      1.51 m/s (<=1.7m/s) AoV Peak P.1 mmHg (<20mmHg) LVOT Max David:  0.90 m/s (<=1.1m/s) LVOT VTI:      14.30 cm LVOT Diameter: 1.90 cm  (1.8-2.4cm) AoV Area,Vmax: 1.69 cm2 (2.5-4.5cm2)  RIGHT VENTRICLE: RV Basal 2.22 cm RV Mid   1.66 cm RV Major 7.1 cm TAPSE:   15.6 mm RV s'    0.15 m/s TRICUSPID VALVE/RVSP:                              Normal Ranges: Peak TR Velocity: 2.72 m/s RV Syst Pressure: 32.6 mmHg (< 30mmHg) PULMONIC VALVE:                      Normal Ranges: PV Max David: 0.7 m/s  (0.6-0.9m/s) PV Max P.1 mmHg  [id number] Jeancarlos Kerr MD Electronically signed on 10/20/2023 at 10:09:08 AM  ** Final **     XR chest 1 view    Result Date: 10/19/2023  Interpreted By:  Sampson Tubbs, STUDY: XR CHEST 1 VIEW;  10/19/2023 3:51 pm   INDICATION: Signs/Symptoms:ams.   COMPARISON: CT abdomen and pelvis dated 10/19/2020 through full CT scan chest in 10/1923. Chest x-ray from 2016.   ACCESSION NUMBER(S): [id number]   ORDERING CLINICIAN: EDWINA RUDD   TECHNIQUE: Single AP portable view of the chest was obtained.   FINDINGS: MEDIASTINUM/ LUNGS/ CARLOS: Trace stable pleural scarring at the lateral right base. No cardiomegaly, vascular congestion, or pleural effusion. No abnormal opacity in either lung worrisome for tumor or pneumonia. No pneumothorax. No tracheal deviation. No abnormal hilar fullness or gross mass on either side.   BONES: No lytic or blastic destructive bone lesion.   UPPER ABDOMEN: Grossly intact.       Trace stable pleural scarring at the lateral right base. Otherwise, negative exam.   Signed by: Sampson Tubbs 10/19/2023 4:23 PM Dictation workstation:   [id number]    CT angio chest abdomen pelvis    Result Date: 10/19/2023  Interpreted By:  Jude Calhoun, STUDY: CT ANGIO CHEST ABDOMEN PELVIS;  10/19/2023 3:24 pm   INDICATION: Signs/Symptoms:AMS CP   COMPARISON: None.   ACCESSION NUMBER(S): [id number]   ORDERING CLINICIAN: EDWINA RUDD   TECHNIQUE: CT of the chest, abdomen, and pelvis was performed. Contiguous axial images were obtained at  5 mm slice thickness through the chest, and at  3 mm through the abdomen and pelvis. Coronal and sagittal MIP reconstructions were obtained and reviewed.. 80 cc Omnipaque 350 injected intravenously.   FINDINGS: CHEST:   No filling defects in the pulmonary  arteries suggest pulmonary embolism. Intact thoracic aorta with no aneurysm or dissection. Trace pericardial fluid. No pleural effusion. No adenopathy. No pneumothorax. No pulmonary consolidation, mass or suspicious nodule. No acute osseous abnormality in the thoracic cage. Bone island in T3 vertebral body.   ABDOMEN AND PELVIS:   Vascular calcification is noted. No aneurysm or dissection in the abdominal aorta and its major branches. Diffuse hepatic steatosis. Unremarkable spleen, pancreas, adrenals, and bilateral kidneys. Mucosal hyperenhancement of stomach and duodenal is seen, suspicious for gastroduodenitis. Bowel loops nonobstructed. Normal appendix. No free air or free fluid. No adenopathy. Sections through the pelvis demonstrate a grossly unremarkable urinary bladder. No adnexal mass.       No evidence of pulmonary embolism.   No thoracoabdominal aortic aneurysm or dissection.   No pneumonia.   Marked diffuse hepatic steatosis.   Questionable gastroduodenitis.   No evidence of bowel obstruction.   Normal appendix.   MACRO: None   Signed by: Jude Calhoun 10/19/2023 4:11 PM Dictation workstation:   [id number]    CT head wo IV contrast    Result Date: 10/19/2023  Interpreted By:  Anthony Reyes, STUDY: CT HEAD WO IV CONTRAST;  10/19/2023 3:23 pm   INDICATION: ams.   COMPARISON: None.   ACCESSION NUMBER(S): [id number]   ORDERING CLINICIAN: ANTHONY RUDD   TECHNIQUE: Contiguous unenhanced axial CT sections are performed from the skull base to the vertex.   FINDINGS: The osseous structures are intact. The visualized portions of the paranasal sinuses and mastoid air cells are clear.   The cortical sulci and CSF spaces are symmetric in appearance. There is no sign of parenchymal hematoma or dense extra-axial fluid collection. There is no edema or mass effect. The gray matter/white-matter differentiation is preserved.       No CT evidence of acute intracranial abnormality.   Signed by: Anthony  Eric 10/19/2023 3:32 PM Dictation workstation:   [id number]    Transthoracic Echo (TTE) Complete    Result Date: 10/20/2023    [facility], [facility] [id number] [phone] and                                 Fax [phone] TRANSTHORACIC ECHOCARDIOGRAM REPORT  Patient Name:     RENEE PLUMMER    Johnathon Physician:   [id number] Jeancarlos Kerr MD Study Date:       10/20/2023          Ordering Provider:   [id number] ABHINAVMOANDREW MAZA MRN/PID:          [id number]            Fellow: Accession#:       [id number]        Nurse: Date of           1960 / 63      Sonographer:         Peter Edwards JAN Birth/Age:        years Gender:           F                   Additional Staff: Height:           167.64 cm           Admit Date:          10/19/2023 Weight:           48.08 kg            Admission Status:    Inpatient - Routine BSA:              1.53 m2             Encounter#:          [id number]                                       Department Location: [facility] Blood Pressure: 86 /54 mmHg Study Type:    TRANSTHORACIC ECHO (TTE) COMPLETE Diagnosis/ICD: Cardiac murmur, unspecified-R01.1 CPT Code:      Echo Complete w Full Doppler-51235; Myocardial Strain                Imaging-[id number] Patient History: Pertinent History: Murmur. Study Detail: The following Echo studies were performed: 2D, M-Mode, Doppler,               color flow and Strain. Technically challenging study due to body               habitus and patient lying in supine position.  PHYSICIAN INTERPRETATION: Left Ventricle: The left ventricular systolic function is normal, with an estimated ejection fraction of 60-65%. There are no regional wall motion abnormalities. The left ventricular cavity size is decreased. Left ventricular diastolic filling was indeterminate. Left Atrium: The left atrium is normal in size. Right Ventricle: The right ventricle is normal in size. There is  normal right ventricular global systolic function. RV anterior free wall appears thickened. Right Atrium: The right atrium is normal in size. Aortic Valve: The aortic valve is trileaflet. There is mild to moderate aortic valve thickening. There is no evidence of aortic valve regurgitation. The peak instantaneous gradient of the aortic valve is 9.1 mmHg. Mitral Valve: The mitral valve is moderately thickened. There is trace mitral valve regurgitation. Tricuspid Valve: The tricuspid valve is structurally normal. There is moderate to severe tricuspid regurgitation. The Doppler estimated RVSP is slightly elevated at 32.6 mmHg. Pulmonic Valve: The pulmonic valve is structurally normal. There is trace pulmonic valve regurgitation. Pericardium: There is a small to moderate pericardial effusion posterior to the left ventricle. The effusion is circumferential. Aorta: The aortic root is normal. There is no dilatation of the ascending aorta. There is no dilatation of the aortic root. Systemic Veins: The inferior vena cava appears to be of normal size.  CONCLUSIONS:  1. Left ventricular systolic function is normal with a 60-65% estimated ejection fraction.  2. There is a small to moderate pericardial effusion.  3. The mitral valve is moderately thickened.  4. Moderate to severe tricuspid regurgitation visualized.  5. Slightly elevated RVSP.  6. Left ventricular cavity size is decreased. QUANTITATIVE DATA SUMMARY: 2D MEASUREMENTS:                          Normal Ranges: LAs:           2.70 cm   (2.7-4.0cm) IVSd:          0.89 cm   (0.6-1.1cm) LVPWd:         0.83 cm   (0.6-1.1cm) LVIDd:         3.53 cm   (3.9-5.9cm) LVIDs:         2.50 cm LV Mass Index: 55.6 g/m2 LV % FS        29.2 % LA VOLUME:                             Normal Ranges: LA Volume Index: 10.0 ml/m2 RA VOLUME BY A/L METHOD:                       Normal Ranges: RA Area A4C: 10.0 cm2 M-MODE MEASUREMENTS:                  Normal Ranges: Ao Root: 2.70 cm (2.0-3.7cm)  LAs:     3.20 cm (2.7-4.0cm) AORTA MEASUREMENTS:                    Normal Ranges: Asc Ao, d: 2.60 cm (2.1-3.4cm) LV SYSTOLIC FUNCTION BY 2D PLANIMETRY (MOD):                     Normal Ranges: EF-A4C View: 68.6 % (>=55%) EF-A2C View: 53.5 % EF-Biplane:  61.3 % LV DIASTOLIC FUNCTION:                           Normal Ranges: MV Peak E:    0.63 m/s    (0.7-1.2 m/s) MV Peak A:    0.56 m/s    (0.42-0.7 m/s) E/A Ratio:    1.14        (1.0-2.2) MV lateral e' 0.07 m/s MV medial e'  0.10 m/s MV A Dur:     120.00 msec MITRAL VALVE:                 Normal Ranges: MV DT: 208 msec (150-240msec) AORTIC VALVE:                         Normal Ranges: AoV Vmax:      1.51 m/s (<=1.7m/s) AoV Peak P.1 mmHg (<20mmHg) LVOT Max David:  0.90 m/s (<=1.1m/s) LVOT VTI:      14.30 cm LVOT Diameter: 1.90 cm  (1.8-2.4cm) AoV Area,Vmax: 1.69 cm2 (2.5-4.5cm2)  RIGHT VENTRICLE: RV Basal 2.22 cm RV Mid   1.66 cm RV Major 7.1 cm TAPSE:   15.6 mm RV s'    0.15 m/s TRICUSPID VALVE/RVSP:                             Normal Ranges: Peak TR Velocity: 2.72 m/s RV Syst Pressure: 32.6 mmHg (< 30mmHg) PULMONIC VALVE:                      Normal Ranges: PV Max David: 0.7 m/s  (0.6-0.9m/s) PV Max P.1 mmHg  [id number] Jeancarlos Kerr MD Electronically signed on 10/20/2023 at 10:09:08 AM  ** Final **     XR chest 1 view    Result Date: 10/19/2023  Interpreted By:  Sampson Tubbs, STUDY: XR CHEST 1 VIEW;  10/19/2023 3:51 pm   INDICATION: Signs/Symptoms:ams.   COMPARISON: CT abdomen and pelvis dated 10/19/2020 through full CT scan chest in 10/1923. Chest x-ray from 2016.   ACCESSION NUMBER(S): [id number]   ORDERING CLINICIAN: EDWINA RUDD   TECHNIQUE: Single AP portable view of the chest was obtained.   FINDINGS: MEDIASTINUM/ LUNGS/ CARLOS: Trace stable pleural scarring at the lateral right base. No cardiomegaly, vascular congestion, or pleural effusion. No abnormal opacity in either lung worrisome for tumor or pneumonia. No pneumothorax. No tracheal  deviation. No abnormal hilar fullness or gross mass on either side.   BONES: No lytic or blastic destructive bone lesion.   UPPER ABDOMEN: Grossly intact.       Trace stable pleural scarring at the lateral right base. Otherwise, negative exam.   Signed by: Sampson Tubbs 10/19/2023 4:23 PM Dictation workstation:   [id number]    CT angio chest abdomen pelvis    Result Date: 10/19/2023  Interpreted By:  Jude Calhoun, STUDY: CT ANGIO CHEST ABDOMEN PELVIS;  10/19/2023 3:24 pm   INDICATION: Signs/Symptoms:AMS CP   COMPARISON: None.   ACCESSION NUMBER(S): [id number]   ORDERING CLINICIAN: EDWINA RUDD   TECHNIQUE: CT of the chest, abdomen, and pelvis was performed. Contiguous axial images were obtained at  5 mm slice thickness through the chest, and at  3 mm through the abdomen and pelvis. Coronal and sagittal MIP reconstructions were obtained and reviewed.. 80 cc Omnipaque 350 injected intravenously.   FINDINGS: CHEST:   No filling defects in the pulmonary arteries suggest pulmonary embolism. Intact thoracic aorta with no aneurysm or dissection. Trace pericardial fluid. No pleural effusion. No adenopathy. No pneumothorax. No pulmonary consolidation, mass or suspicious nodule. No acute osseous abnormality in the thoracic cage. Bone island in T3 vertebral body.   ABDOMEN AND PELVIS:   Vascular calcification is noted. No aneurysm or dissection in the abdominal aorta and its major branches. Diffuse hepatic steatosis. Unremarkable spleen, pancreas, adrenals, and bilateral kidneys. Mucosal hyperenhancement of stomach and duodenal is seen, suspicious for gastroduodenitis. Bowel loops nonobstructed. Normal appendix. No free air or free fluid. No adenopathy. Sections through the pelvis demonstrate a grossly unremarkable urinary bladder. No adnexal mass.       No evidence of pulmonary embolism.   No thoracoabdominal aortic aneurysm or dissection.   No pneumonia.   Marked diffuse hepatic steatosis.   Questionable  gastroduodenitis.   No evidence of bowel obstruction.   Normal appendix.   MACRO: None   Signed by: Jude Calhoun 10/19/2023 4:11 PM Dictation workstation:   [id number]    CT head wo IV contrast    Result Date: 10/19/2023  Interpreted By:  Anthony Reyes, STUDY: CT HEAD WO IV CONTRAST;  10/19/2023 3:23 pm   INDICATION: ams.   COMPARISON: None.   ACCESSION NUMBER(S): [id number]   ORDERING CLINICIAN: ANTHONY RUDD   TECHNIQUE: Contiguous unenhanced axial CT sections are performed from the skull base to the vertex.   FINDINGS: The osseous structures are intact. The visualized portions of the paranasal sinuses and mastoid air cells are clear.   The cortical sulci and CSF spaces are symmetric in appearance. There is no sign of parenchymal hematoma or dense extra-axial fluid collection. There is no edema or mass effect. The gray matter/white-matter differentiation is preserved.       No CT evidence of acute intracranial abnormality.   Signed by: Anthony Reyes 10/19/2023 3:32 PM Dictation workstation:   [id number]             Assessment/Plan   This patient currently has cardiac telemetry ordered; if you would like to modify or discontinue the telemetry order, click here to go to the orders activity to modify/discontinue the order.    Principal Problem:    DKA, type 2, not at goal (CMS/HCC)  Assessment + Plan:   Ivelisse Paulino is a 63-year-old female with a past medical history of type II DM presenting with DKA episode.  Patient was found to have some abdominal pain and nausea along with chest discomfort.  Patient has sustained ketoacidosis with elevated ketones and elevated anion gap with elevated blood sugars.  Patient is being admitted to ICU for further medical management    Daily progress:  10/21: Blood sugars controlled.  Blood pressure 90 systolic.  Continues to be on low-dose levo 0.1.  Attempt to wean Levophed and add midodrine 5 mg 3 times daily.  Off pressors and blood pressure controlled  with MAP greater than 65 can transfer from ICU.  Continue vancomycin and meropenem.  Creatinine 1.08, BUN 21.  Urine output 1.4 L.  Continue 12 units of Lantus once daily and sliding scale insulin.     Neuro:  #Acute metabolic encephalopathy in setting of DKA-resolved  -Alert and oriented x3  - Monitor vitals  - Maintain sleep hygiene  - Monitor for ICU delirium     CV:  #HTN  #HLD  - Patient not on any antihypertensives  - Requires low-dose Lopressor.  Attempt to wean while starting midodrine 5 mg 3 times daily  - Stop LR maintenance fluids as patient is eating and drinking  #Systolic murmur  - Follow-up blood cultures  -Echo which does not show any evidence of endocarditis EF 66 5%  - Continue antibiotics including vancomycin and meropenem     Respiratory:  -No acute respiratory distress.  Patient does not require any oxygen     GI:  - Eating and drinking okay  -Protonix for stress ulcer prophylaxis     Endo:  #DKA-resolved  #Type II DM  - DKA episode resolved.  -Patient eating and drinking okay  - Patient to continue taking 12 units of Lantus once daily and keep on sliding scale  - Not to continue metformin on discharge  - Endocrinology consult: Patient could possibly have autoimmune DM but is officially diagnosed with type II DM.  Patient has SLE and could correlate with diagnosis of autoimmune DM.  - HbA1c 15.3 indicates poorly controlled type II DM.  Patient needs to be on insulin regimen before being discharged     Renal:  #KLEBER-resolved  #Hyponatremia-resolved  - Renal function stable  - Monitor BMP and optimize electrolyte     Hematological:  #Leukocytosis  #Neutrophilia  #Macrocytic anemia  - No indication of any infection.  Patient afebrile  - Monitor CBC     ID:  #Leukocytosis-resolved  #Neutrophilia-resolved  -Systolic murmur present.  Afebrile.    - Echo shows 60 to 65% ejection fraction, moderate to severe tricuspid regurgitation, mitral valve is moderately thickened, no evidence of endocarditis  - CRP  15.5.  Procalcitonin 6.9  - Continue IV vancomycin and meropenem     Vent/O2: None  Lines/Devices: IV peripheral  Drips: None  ATBx: None  Fluids: None  Diet: Regular diet  PPX: Protonix  Code status: Full code  Dispo: ICU      Albert Cortes MD  PGY1 Internal Medicine

## 2023-10-21 NOTE — CONSULTS
Nutrition Assessment Note      Name: Ivelisse Paulino  ROOM: 126/126-A  Nurse:No care team member to display    AGE: 63 y.o.    GENDER: female MRN: [id number]  Code: Full Code         Reason for Assessment  Reason for Assessment: Admission nursing screening    Subjective  Attempted to see pt this morning and she was teary eyed. Tried to see her this evening. She had a visitor and answered a couple of questions but was a bit confused and not able to answer appropriately.            Objective   Per Flowsheet Percent Meal intake:    Dietary Orders (From admission, onward)       Start     Ordered    10/20/23 0728  Adult diet Carb Controlled; 60 gram carb/meal, 30 gram Carb evening snack  Diet effective now        Question Answer Comment   Diet type Carb Controlled    Carb diet selection: 60 gram carb/meal, 30 gram Carb evening snack        10/20/23 0728                    Results from last 7 days   Lab Units 10/20/23  1724 10/20/23  0920 10/20/23  0536 10/19/23  1955 10/19/23  1501   GLUCOSE mg/dL 231* 141* 274*   < > 964*   SODIUM mmol/L 135* 140 140   < > 128*   POTASSIUM mmol/L 3.7 3.5 3.6   < > 5.0   CHLORIDE mmol/L 106 108* 110*   < > 92*   CO2 mmol/L 24 24 24   < > 5*   BUN mg/dL 26* 31* 34*   < > 36*   CREATININE mg/dL 1.14* 1.53* 1.48*   < > 1.77*   EGFR mL/min/1.73m*2 54* 38* 40*   < > 32*   CALCIUM mg/dL 9.6 10.3 10.2   < > 10.7*   MAGNESIUM mg/dL  --   --  1.96  --  2.60*    < > = values in this interval not displayed.     Lab Results   Component Value Date    HGBA1C 15.3 (H) 10/20/2023     Results from last 7 days   Lab Units 10/20/23  1943 10/20/23  1615 10/20/23  1139 10/20/23  0806 10/20/23  0703 10/20/23  0601 10/20/23  0500 10/20/23  0401   POCT GLUCOSE mg/dL 127* 218* 71* 172* 187* 233* 279* 337*     GI per flowsheet:  Gastrointestinal  Gastrointestinal (WDL): Exceptions to WDL  Abdominal Tenderness: Soft  Bowel Sounds: All quadrants  Bowel Sounds (All Quadrants): Hypoactive  Gastrointestinal Symptoms:  "Nausea  Last bowel movement documented:    Past Medical History:   Diagnosis Date    Encounter for screening for malignant neoplasm of colon 09/17/2018    Screening for colon cancer    Essential (primary) hypertension 02/11/2019    HTN (hypertension), benign    Personal history of other diseases of the digestive system     History of esophageal reflux    Personal history of other diseases of the nervous system and sense organs     History of cluster headache    Personal history of other diseases of the respiratory system     History of pleurisy    Personal history of other diseases of urinary system     History of bladder problems    Personal history of other endocrine, nutritional and metabolic disease     History of hypothyroidism    Personal history of other endocrine, nutritional and metabolic disease 02/11/2019    History of type 2 diabetes mellitus    Personal history of other endocrine, nutritional and metabolic disease 02/11/2019    History of hypothyroidism    Personal history of other endocrine, nutritional and metabolic disease 05/13/2019    History of type 2 diabetes mellitus    Personal history of other endocrine, nutritional and metabolic disease 04/15/2019    History of type 2 diabetes mellitus    Personal history of other endocrine, nutritional and metabolic disease 01/09/2019    History of type 2 diabetes mellitus    Personal history of other endocrine, nutritional and metabolic disease 03/13/2018    History of hypothyroidism    Personal history of other medical treatment 03/01/2016    History of screening mammography    Systemic lupus erythematosus, unspecified (CMS/HCC)     History of systemic lupus erythematosus (SLE)     Principal Problem:    DKA, type 2, not at goal (CMS/HCC)     Allergies: Penicillins and Esomeprazole     Anthropometrics:  Height: 170 cm (5' 6.93\")  Weight: 48.5 kg (106 lb 14.8 oz)  BMI (Calculated): 16.78    cm    61 kg  79 %       Daily Weight  10/20/23 : 48.5 kg (106 lb " "14.8 oz)  03/08/22 : 60 kg (132 lb 3.2 oz)  10/21/19 : 69.3 kg (152 lb 12.5 oz)     Total Energy Estimated Needs (kCal):  (6201-3527)  Method for Estimating Needs: MSJ    Total Protein Estimated Needs (g):  (61-73)  Method for Estimating Needs: 1-1.2 gm kg of IBW    Total Fluid Estimated Needs (mL):  (3807-2219 as medically indicated)         Nutrition Focused Physical Findings: Pt was confused and teary eyed.          Skin:  (please see nursing/wound notes for further details)  Edema:   Pain Score: 3     Diagnosis        Pt has Nutrition Diagnosis: Yes  Diagnosis Status (1): New  Nutrition Diagnosis 1: Underweight  Related to (1): limited food acceptance  As Evidenced by (1): Pt appears underweight.  Her ht per her and her physician records is 5'2\" but her ht on admit was 5'7\".  Although she would be IBW if she was 5'2\" and she appears underweight.  She is too confused to effectively help me with her ht and wt.       Intervention:  Coordination of Care: Dr Cortes      Goals:      Recommendation(s):  Individualized Nutrition Prescription Provided for : continue 60 gm carb consistent as long as pt eats well.  sending glucerna X 2 to encourage intake          Monitoring and Evaluation   Weights  Labs  PO intake    Time Spent (min): 45 minutes  Last Date of Nutrition Visit: 10/20/23  Nutrition Follow-Up Needed?: 3-5 days  Follow up Comment: 10/23   MZ   "

## 2023-10-22 LAB
ANION GAP SERPL CALC-SCNC: 9 MMOL/L (ref 10–20)
BUN SERPL-MCNC: 23 MG/DL (ref 6–23)
C PEPTIDE SERPL-MCNC: 0.2 NG/ML (ref 0.7–3.9)
CALCIUM SERPL-MCNC: 8.9 MG/DL (ref 8.6–10.3)
CHLORIDE SERPL-SCNC: 106 MMOL/L (ref 98–107)
CO2 SERPL-SCNC: 28 MMOL/L (ref 21–32)
CREAT SERPL-MCNC: 0.96 MG/DL (ref 0.5–1.05)
ERYTHROCYTE [DISTWIDTH] IN BLOOD BY AUTOMATED COUNT: 13.3 % (ref 11.5–14.5)
GFR SERPL CREATININE-BSD FRML MDRD: 67 ML/MIN/1.73M*2
GLUCOSE BLD MANUAL STRIP-MCNC: 147 MG/DL (ref 74–99)
GLUCOSE BLD MANUAL STRIP-MCNC: 162 MG/DL (ref 74–99)
GLUCOSE BLD MANUAL STRIP-MCNC: 186 MG/DL (ref 74–99)
GLUCOSE BLD MANUAL STRIP-MCNC: 249 MG/DL (ref 74–99)
GLUCOSE SERPL-MCNC: 267 MG/DL (ref 74–99)
HCT VFR BLD AUTO: 32.3 % (ref 36–46)
HGB BLD-MCNC: 10.6 G/DL (ref 12–16)
MCH RBC QN AUTO: 33.3 PG (ref 26–34)
MCHC RBC AUTO-ENTMCNC: 32.8 G/DL (ref 32–36)
MCV RBC AUTO: 102 FL (ref 80–100)
NRBC BLD-RTO: 0 /100 WBCS (ref 0–0)
PLATELET # BLD AUTO: 205 X10*3/UL (ref 150–450)
PMV BLD AUTO: 9.9 FL (ref 7.5–11.5)
POTASSIUM SERPL-SCNC: 4 MMOL/L (ref 3.5–5.3)
PROCALCITONIN SERPL-MCNC: 6.05 NG/ML
RBC # BLD AUTO: 3.18 X10*6/UL (ref 4–5.2)
SODIUM SERPL-SCNC: 139 MMOL/L (ref 136–145)
VANCOMYCIN SERPL-MCNC: 16.2 UG/ML (ref 5–20)
WBC # BLD AUTO: 4.8 X10*3/UL (ref 4.4–11.3)

## 2023-10-22 PROCEDURE — 96372 THER/PROPH/DIAG INJ SC/IM: CPT

## 2023-10-22 PROCEDURE — 2500000004 HC RX 250 GENERAL PHARMACY W/ HCPCS (ALT 636 FOR OP/ED)

## 2023-10-22 PROCEDURE — 2500000001 HC RX 250 WO HCPCS SELF ADMINISTERED DRUGS (ALT 637 FOR MEDICARE OP): Performed by: STUDENT IN AN ORGANIZED HEALTH CARE EDUCATION/TRAINING PROGRAM

## 2023-10-22 PROCEDURE — 87040 BLOOD CULTURE FOR BACTERIA: CPT | Mod: CMCLAB,PARLAB | Performed by: STUDENT IN AN ORGANIZED HEALTH CARE EDUCATION/TRAINING PROGRAM

## 2023-10-22 PROCEDURE — 2500000004 HC RX 250 GENERAL PHARMACY W/ HCPCS (ALT 636 FOR OP/ED): Performed by: STUDENT IN AN ORGANIZED HEALTH CARE EDUCATION/TRAINING PROGRAM

## 2023-10-22 PROCEDURE — 80202 ASSAY OF VANCOMYCIN: CPT | Performed by: HOSPITALIST

## 2023-10-22 PROCEDURE — 82947 ASSAY GLUCOSE BLOOD QUANT: CPT

## 2023-10-22 PROCEDURE — 96372 THER/PROPH/DIAG INJ SC/IM: CPT | Performed by: INTERNAL MEDICINE

## 2023-10-22 PROCEDURE — 2500000004 HC RX 250 GENERAL PHARMACY W/ HCPCS (ALT 636 FOR OP/ED): Performed by: HOSPITALIST

## 2023-10-22 PROCEDURE — 2500000002 HC RX 250 W HCPCS SELF ADMINISTERED DRUGS (ALT 637 FOR MEDICARE OP, ALT 636 FOR OP/ED)

## 2023-10-22 PROCEDURE — 2500000002 HC RX 250 W HCPCS SELF ADMINISTERED DRUGS (ALT 637 FOR MEDICARE OP, ALT 636 FOR OP/ED): Performed by: STUDENT IN AN ORGANIZED HEALTH CARE EDUCATION/TRAINING PROGRAM

## 2023-10-22 PROCEDURE — 96372 THER/PROPH/DIAG INJ SC/IM: CPT | Performed by: STUDENT IN AN ORGANIZED HEALTH CARE EDUCATION/TRAINING PROGRAM

## 2023-10-22 PROCEDURE — 2020000001 HC ICU ROOM DAILY

## 2023-10-22 PROCEDURE — 36415 COLL VENOUS BLD VENIPUNCTURE: CPT | Performed by: STUDENT IN AN ORGANIZED HEALTH CARE EDUCATION/TRAINING PROGRAM

## 2023-10-22 PROCEDURE — 2500000002 HC RX 250 W HCPCS SELF ADMINISTERED DRUGS (ALT 637 FOR MEDICARE OP, ALT 636 FOR OP/ED): Performed by: INTERNAL MEDICINE

## 2023-10-22 PROCEDURE — 85027 COMPLETE CBC AUTOMATED: CPT

## 2023-10-22 PROCEDURE — 80048 BASIC METABOLIC PNL TOTAL CA: CPT

## 2023-10-22 PROCEDURE — 36415 COLL VENOUS BLD VENIPUNCTURE: CPT

## 2023-10-22 RX ORDER — INSULIN LISPRO 100 [IU]/ML
2 INJECTION, SOLUTION INTRAVENOUS; SUBCUTANEOUS
Status: DISCONTINUED | OUTPATIENT
Start: 2023-10-22 | End: 2023-10-24

## 2023-10-22 RX ORDER — MIDODRINE HYDROCHLORIDE 10 MG/1
10 TABLET ORAL EVERY 8 HOURS
Status: DISCONTINUED | OUTPATIENT
Start: 2023-10-22 | End: 2023-10-22

## 2023-10-22 RX ADMIN — MIDODRINE HYDROCHLORIDE 15 MG: 10 TABLET ORAL at 18:53

## 2023-10-22 RX ADMIN — VANCOMYCIN HYDROCHLORIDE 1250 MG: 1.25 INJECTION, POWDER, LYOPHILIZED, FOR SOLUTION INTRAVENOUS at 20:27

## 2023-10-22 RX ADMIN — INSULIN LISPRO 12 UNITS: 100 INJECTION, SOLUTION INTRAVENOUS; SUBCUTANEOUS at 08:23

## 2023-10-22 RX ADMIN — MIDODRINE HYDROCHLORIDE 5 MG: 5 TABLET ORAL at 03:26

## 2023-10-22 RX ADMIN — SODIUM CHLORIDE, POTASSIUM CHLORIDE, SODIUM LACTATE AND CALCIUM CHLORIDE 1000 ML: 600; 310; 30; 20 INJECTION, SOLUTION INTRAVENOUS at 16:32

## 2023-10-22 RX ADMIN — INSULIN LISPRO 2 UNITS: 100 INJECTION, SOLUTION INTRAVENOUS; SUBCUTANEOUS at 12:16

## 2023-10-22 RX ADMIN — INSULIN LISPRO 2 UNITS: 100 INJECTION, SOLUTION INTRAVENOUS; SUBCUTANEOUS at 20:32

## 2023-10-22 RX ADMIN — PANTOPRAZOLE SODIUM 40 MG: 40 TABLET, DELAYED RELEASE ORAL at 06:24

## 2023-10-22 RX ADMIN — INSULIN LISPRO 4 UNITS: 100 INJECTION, SOLUTION INTRAVENOUS; SUBCUTANEOUS at 12:15

## 2023-10-22 RX ADMIN — MEROPENEM 1 G: 1 INJECTION, POWDER, FOR SOLUTION INTRAVENOUS at 20:27

## 2023-10-22 RX ADMIN — HEPARIN SODIUM 5000 UNITS: 5000 INJECTION INTRAVENOUS; SUBCUTANEOUS at 06:24

## 2023-10-22 RX ADMIN — HEPARIN SODIUM 5000 UNITS: 5000 INJECTION INTRAVENOUS; SUBCUTANEOUS at 21:00

## 2023-10-22 RX ADMIN — INSULIN LISPRO 4 UNITS: 100 INJECTION, SOLUTION INTRAVENOUS; SUBCUTANEOUS at 20:30

## 2023-10-22 RX ADMIN — MEROPENEM 1 G: 1 INJECTION, POWDER, FOR SOLUTION INTRAVENOUS at 08:54

## 2023-10-22 RX ADMIN — MIDODRINE HYDROCHLORIDE 10 MG: 10 TABLET ORAL at 11:00

## 2023-10-22 RX ADMIN — INSULIN GLARGINE 12 UNITS: 100 INJECTION, SOLUTION SUBCUTANEOUS at 08:20

## 2023-10-22 RX ADMIN — INSULIN LISPRO 2 UNITS: 100 INJECTION, SOLUTION INTRAVENOUS; SUBCUTANEOUS at 17:30

## 2023-10-22 RX ADMIN — INSULIN LISPRO 2 UNITS: 100 INJECTION, SOLUTION INTRAVENOUS; SUBCUTANEOUS at 08:15

## 2023-10-22 ASSESSMENT — COGNITIVE AND FUNCTIONAL STATUS - GENERAL
MOBILITY SCORE: 23
CLIMB 3 TO 5 STEPS WITH RAILING: A LITTLE
DAILY ACTIVITIY SCORE: 24

## 2023-10-22 ASSESSMENT — PAIN - FUNCTIONAL ASSESSMENT
PAIN_FUNCTIONAL_ASSESSMENT: 0-10

## 2023-10-22 ASSESSMENT — PAIN SCALES - GENERAL
PAINLEVEL_OUTOF10: 0 - NO PAIN

## 2023-10-22 NOTE — PROGRESS NOTES
"Vancomycin Dosing by Pharmacy- FOLLOW UP    Ivelisse Paulino is a 63 y.o. year old female who Pharmacy has been consulted for vancomycin dosing for other shock . Based on the patient's indication and renal status this patient is being dosed based on a goal AUC of 400-600.     Renal function is currently stable.    Current vancomycin dose: 1000 mg given every 24 hours    Most recent random level: 16.2 mcg/mL    Visit Vitals  BP 96/68   Pulse 105   Temp 36.5 °C (97.7 °F)   Resp 22        Lab Results   Component Value Date    CREATININE 0.96 10/22/2023    CREATININE 1.08 (H) 10/21/2023    CREATININE 1.14 (H) 10/20/2023    CREATININE 1.53 (H) 10/20/2023        Patient weight is No results found for: \"PTWEIGHT\"    No results found for: \"CULTURE\"     I/O last 3 completed shifts:  In: 3682.5 (75.9 mL/kg) [P.O.:1300; I.V.:2032.5 (41.9 mL/kg); IV Piggyback:350]  Out: 2550 (52.6 mL/kg) [Urine:2550 (1.5 mL/kg/hr)]  Weight: 48.5 kg   [unfilled]    Lab Results   Component Value Date    PATIENTTEMP 37.0 10/20/2023    PATIENTTEMP 37.0 10/19/2023    PATIENTTEMP 37.0 10/19/2023        Assessment/Plan    Increase dose to 1.25 g iv q 24 hours    This dosing regimen is predicted by [employer] to result in the following pharmacokinetic parameters:    Regimen: 1250 mg IV every 24 hours.  Start time: 20:31 on 10/22/2023  Exposure target: AUC24 (range)400-600 mg/L.hr   AUC24,ss: 566 mg/L.hr  Probability of AUC24 > 400: 97 %  Ctrough,ss: 15.8 mg/L  Probability of Ctrough,ss > 20: 21 %  Probability of nephrotoxicity (Lodise XI 2009): 11 %    The next level will be obtained on 10.23.23 at 0500. May be obtained sooner if clinically indicated.   Will continue to monitor renal function daily while on vancomycin and order serum creatinine at least every 48 hours if not already ordered.  Follow for continued vancomycin needs, clinical response, and signs/symptoms of toxicity.       Sary Barbosa, PharmD           "

## 2023-10-22 NOTE — PROGRESS NOTES
"    Endocrinology Inpatient Consult Progress Note     PATIENT NAME: Ivelisse Paulino  MRN: [id number]  DATE: 10/22/2023    CONSULTING PHYSICIAN: Dr. Rush  REASON FOR CONSULT: DKA. T1DM.      Interval Events     No acute events.  Patient did have episode of hypoglycemia yesterday after breakfast.  At that point prandial insulin was discontinued by the ICU team.  Patient was seen this morning eating her breakfast in her chair.  She states that she feels like she should not eat any carbohydrates.  I explained to her that this is not the case, we are giving her insulin.      Physical Examination     BP 94/60   Pulse 72   Temp 36.8 °C (98.2 °F)   Resp 17   Ht 1.7 m (5' 6.93\")   Wt 48.5 kg (106 lb 14.8 oz)   SpO2 95%   BMI 16.78 kg/m²   No acute distress.  Alert and oriented x3.  RRR.  Nonlabored respiration.  Soft nontender nondistended abdomen.  No pedal edema bilaterally.  Appropriate affect.      Medications     Reviewed MAR       Data     Recent Labs and Imaging Reviewed    Date  PreBreakfast Pre Lunch Pre Dinner Bedtime 3AM    10/20 187 (G12, H4+4) 71 218 (H4+8) 127    10/21 228 (G12, H4+4) 65 183 (H+4) 177 (H+4)    10/22                    Assessment / Plan        # DM NOS:   HbA1c (10/23): 15.3  Home Regimen: Metformin.  Presented in [place] DKA.     C Peptide noted to be 0.28 glucose of 204.  Patient is not making insulin.  Is been a full MDI program.    -Continue glargine 12 units in the morning  -Decrease lispro 2 units with each meal, discussed with RN at the bedside  -Continue lispro sliding scale with meals and at bedtime  -We will need diabetes education and starting on a multiple daily injection program.  -Follow T1DM Abs  -No metformin on discharge.  -Explained to the patient she has been a multiple daily injection program.     # KLEBER: Improving isotonic fluid administration.  Likely prerenal in nature with osmotic diuresis from hyperglycemia.     # Hypothyroidism: Maintained on levothyroxine.  " Reviewed TSH was only mildly elevated to 4.78 in the setting of acute illness.  No intervention at this time.  Continue same levothyroxine dose.     # NAFLD: Incidentally seen on cross-sectional imaging.  She surely does not have the phenotype for the same.     #Encephalopathy: Suspect this is secondary to her metabolic disturbances as mentioned above.  Let us try to achieve euglycemia and see if this persist.  Frequent reorientation.    #Hypertension: Reviewed urine output creatinine ratio was found to be 113.8.  This is the setting of an KLEBER.  Perhaps we can recheck this later once her renal function improves.     # Dyslipidemia: We will explore more when she is more clinically stable.    # Severe Protein Calorie Malnutrion: There was question that she could have adrenal insufficiency given her likely type 1 diabetes mellitus as well as SLE.  We did obtain a morning cortisol yesterday which was found to be 36.3.  This is reassuring against adrenal insufficiency.  No indication for glucocorticoids.  Nutrition following.       Donny Garcia,   Endocrinology, Diabetes, and Metabolism    Available via [employer] Messenger

## 2023-10-22 NOTE — PROGRESS NOTES
Ivelisse Plummer is a 63 y.o. female on day 3 of admission presenting with DKA, type 2, not at goal (CMS/HCC).      Subjective   Patient seen and examined at bedside.  Patient sitting up.  Comfortable and alert x3.  Orientated.  Patient not in any acute distress.  No acute events overnight.  No urinary symptoms.  Normal bowel movements.  Patient denies any abdominal pain, nausea, vomiting.  Afebrile.  Eating and drinking okay.       Objective     Last Recorded Vitals  BP 84/54   Pulse 73   Temp 36.2 °C (97.2 °F) (Temporal)   Resp 20   Wt 48.5 kg (106 lb 14.8 oz)   SpO2 98%   Intake/Output last 3 Shifts:    Intake/Output Summary (Last 24 hours) at 10/22/2023 1722  Last data filed at 10/22/2023 1700  Gross per 24 hour   Intake 1826.15 ml   Output 1050 ml   Net 776.15 ml         Admission Weight  Weight: 63.5 kg (140 lb) (10/19/23 1455)    Daily Weight  10/20/23 : 48.5 kg (106 lb 14.8 oz)    Image Results  Transthoracic Echo (TTE) [facility], [address] [place] [id number] [phone] and                                  Fax [phone]    TRANSTHORACIC ECHOCARDIOGRAM REPORT       Patient Name:     IVELISSE PLUMMER    Reading Physician:   [id number] Jeancarlos Kerr MD  Study Date:       10/20/2023          Ordering Provider:   [id number] MICHAEL MAZA  MRN/PID:          [id number]            Fellow:  Accession#:       [id number]        Nurse:  Date of           1960 / 63      Sonographer:         Peter Edwards RDCS  Birth/Age:        years  Gender:           F                   Additional Staff:  Height:           167.64 cm           Admit Date:          10/19/2023  Weight:           48.08 kg            Admission Status:    Inpatient - Routine  BSA:              1.53 m2             Encounter#:          [id number]                                        Department Location: [facility]  Blood Pressure: 86 /54 mmHg    Study Type:     TRANSTHORACIC ECHO (TTE) COMPLETE  Diagnosis/ICD: Cardiac murmur, unspecified-R01.1  CPT Code:      Echo Complete w Full Doppler-06368; Myocardial Strain                 Imaging-[id number]    Patient History:  Pertinent History: Murmur.    Study Detail: The following Echo studies were performed: 2D, M-Mode, Doppler,                color flow and Strain. Technically challenging study due to body                habitus and patient lying in supine position.       PHYSICIAN INTERPRETATION:  Left Ventricle: The left ventricular systolic function is normal, with an estimated ejection fraction of 60-65%. There are no regional wall motion abnormalities. The left ventricular cavity size is decreased. Left ventricular diastolic filling was indeterminate.  Left Atrium: The left atrium is normal in size.  Right Ventricle: The right ventricle is normal in size. There is normal right ventricular global systolic function. RV anterior free wall appears thickened.  Right Atrium: The right atrium is normal in size.  Aortic Valve: The aortic valve is trileaflet. There is mild to moderate aortic valve thickening. There is no evidence of aortic valve regurgitation. The peak instantaneous gradient of the aortic valve is 9.1 mmHg.  Mitral Valve: The mitral valve is moderately thickened. There is trace mitral valve regurgitation.  Tricuspid Valve: The tricuspid valve is structurally normal. There is moderate to severe tricuspid regurgitation. The Doppler estimated RVSP is slightly elevated at 32.6 mmHg.  Pulmonic Valve: The pulmonic valve is structurally normal. There is trace pulmonic valve regurgitation.  Pericardium: There is a small to moderate pericardial effusion posterior to the left ventricle. The effusion is circumferential.  Aorta: The aortic root is normal. There is no dilatation of the ascending aorta. There is no dilatation of the aortic root.  Systemic Veins: The inferior vena cava appears to be of normal size.        CONCLUSIONS:   1. Left ventricular systolic function is normal with a 60-65% estimated ejection fraction.   2. There is a small to moderate pericardial effusion.   3. The mitral valve is moderately thickened.   4. Moderate to severe tricuspid regurgitation visualized.   5. Slightly elevated RVSP.   6. Left ventricular cavity size is decreased.    QUANTITATIVE DATA SUMMARY:  2D MEASUREMENTS:                           Normal Ranges:  LAs:           2.70 cm   (2.7-4.0cm)  IVSd:          0.89 cm   (0.6-1.1cm)  LVPWd:         0.83 cm   (0.6-1.1cm)  LVIDd:         3.53 cm   (3.9-5.9cm)  LVIDs:         2.50 cm  LV Mass Index: 55.6 g/m2  LV % FS        29.2 %    LA VOLUME:                              Normal Ranges:  LA Volume Index: 10.0 ml/m2    RA VOLUME BY A/L METHOD:                        Normal Ranges:  RA Area A4C: 10.0 cm2    M-MODE MEASUREMENTS:                   Normal Ranges:  Ao Root: 2.70 cm (2.0-3.7cm)  LAs:     3.20 cm (2.7-4.0cm)    AORTA MEASUREMENTS:                     Normal Ranges:  Asc Ao, d: 2.60 cm (2.1-3.4cm)    LV SYSTOLIC FUNCTION BY 2D PLANIMETRY (MOD):                      Normal Ranges:  EF-A4C View: 68.6 % (>=55%)  EF-A2C View: 53.5 %  EF-Biplane:  61.3 %    LV DIASTOLIC FUNCTION:                            Normal Ranges:  MV Peak E:    0.63 m/s    (0.7-1.2 m/s)  MV Peak A:    0.56 m/s    (0.42-0.7 m/s)  E/A Ratio:    1.14        (1.0-2.2)  MV lateral e' 0.07 m/s  MV medial e'  0.10 m/s  MV A Dur:     120.00 msec    MITRAL VALVE:                  Normal Ranges:  MV DT: 208 msec (150-240msec)    AORTIC VALVE:                          Normal Ranges:  AoV Vmax:      1.51 m/s (<=1.7m/s)  AoV Peak P.1 mmHg (<20mmHg)  LVOT Max David:  0.90 m/s (<=1.1m/s)  LVOT VTI:      14.30 cm  LVOT Diameter: 1.90 cm  (1.8-2.4cm)  AoV Area,Vmax: 1.69 cm2 (2.5-4.5cm2)       RIGHT VENTRICLE:  RV Basal 2.22 cm  RV Mid   1.66 cm  RV Major 7.1 cm  TAPSE:   15.6 mm  RV s'    0.15 m/s    TRICUSPID VALVE/RVSP:                               Normal Ranges:  Peak TR Velocity: 2.72 m/s  RV Syst Pressure: 32.6 mmHg (< 30mmHg)    PULMONIC VALVE:                       Normal Ranges:  PV Max David: 0.7 m/s  (0.6-0.9m/s)  PV Max P.1 mmHg       [id number] Jeancarlos Kerr MD  Electronically signed on 10/20/2023 at 10:09:08 AM       ** Final **      Physical Exam  Physical Exam:  General: Alert and orientated x3.  Patient is calm and  HEENT:  Normocephalic, atraumatic, mucus membranes moist.  Smells of ketones on breath  Neck:  Trachea midline.  No JVD.    Chest:  Clear to auscultation bilaterally. No wheezes, rales, or rhonchi.  CV:  Regular rate and rhythm.  Positive S1/S2.   Abdomen: Soft, diffuse tenderness.  Bowel sounds present.  Extremities:  No lower extremity edema or cyanosis.   Neurological:  AAOx3. No focal deficits.  Skin:  Warm and dry.   Relevant Results      Results for orders placed or performed during the hospital encounter of 10/19/23 (from the past 24 hour(s))   POCT GLUCOSE   Result Value Ref Range    POCT Glucose 183 (H) 74 - 99 mg/dL   POCT GLUCOSE   Result Value Ref Range    POCT Glucose 177 (H) 74 - 99 mg/dL   CBC   Result Value Ref Range    WBC 4.8 4.4 - 11.3 x10*3/uL    nRBC 0.0 0.0 - 0.0 /100 WBCs    RBC 3.18 (L) 4.00 - 5.20 x10*6/uL    Hemoglobin 10.6 (L) 12.0 - 16.0 g/dL    Hematocrit 32.3 (L) 36.0 - 46.0 %     (H) 80 - 100 fL    MCH 33.3 26.0 - 34.0 pg    MCHC 32.8 32.0 - 36.0 g/dL    RDW 13.3 11.5 - 14.5 %    Platelets 205 150 - 450 x10*3/uL    MPV 9.9 7.5 - 11.5 fL   Basic metabolic panel   Result Value Ref Range    Glucose 267 (H) 74 - 99 mg/dL    Sodium 139 136 - 145 mmol/L    Potassium 4.0 3.5 - 5.3 mmol/L    Chloride 106 98 - 107 mmol/L    Bicarbonate 28 21 - 32 mmol/L    Anion Gap 9 (L) 10 - 20 mmol/L    Urea Nitrogen 23 6 - 23 mg/dL    Creatinine 0.96 0.50 - 1.05 mg/dL    eGFR 67 >60 mL/min/1.73m*2    Calcium 8.9 8.6 - 10.3 mg/dL   Vancomycin   Result Value Ref Range    Vancomycin 16.2 5.0 - 20.0  ug/mL   POCT GLUCOSE   Result Value Ref Range    POCT Glucose 249 (H) 74 - 99 mg/dL   POCT GLUCOSE   Result Value Ref Range    POCT Glucose 162 (H) 74 - 99 mg/dL    Transthoracic Echo (TTE) Complete    Result Date: 10/20/2023    [facility], [facility] [id number] [phone] and                                 Fax [phone] TRANSTHORACIC ECHOCARDIOGRAM REPORT  Patient Name:     RENEE PLUMEMR    Reading Physician:   [id number] Jeancarlos Kerr MD Study Date:       10/20/2023          Ordering Provider:   [id number] ABHINAVDUANE MAZA MRN/PID:          [id number]            Fellow: Accession#:       [id number]        Nurse: Date of           1960 / 63      Sonographer:         Peter Edwards RDCS Birth/Age:        years Gender:           F                   Additional Staff: Height:           167.64 cm           Admit Date:          10/19/2023 Weight:           48.08 kg            Admission Status:    Inpatient - Routine BSA:              1.53 m2             Encounter#:          [id number]                                       Department Location: [facility] Blood Pressure: 86 /54 mmHg Study Type:    TRANSTHORACIC ECHO (TTE) COMPLETE Diagnosis/ICD: Cardiac murmur, unspecified-R01.1 CPT Code:      Echo Complete w Full Doppler-04327; Myocardial Strain                Imaging-11307 Patient History: Pertinent History: Murmur. Study Detail: The following Echo studies were performed: 2D, M-Mode, Doppler,               color flow and Strain. Technically challenging study due to body               habitus and patient lying in supine position.  PHYSICIAN INTERPRETATION: Left Ventricle: The left ventricular systolic function is normal, with an estimated ejection fraction of 60-65%. There are no regional wall motion abnormalities. The left ventricular cavity size is decreased. Left ventricular diastolic filling was indeterminate. Left Atrium: The left  atrium is normal in size. Right Ventricle: The right ventricle is normal in size. There is normal right ventricular global systolic function. RV anterior free wall appears thickened. Right Atrium: The right atrium is normal in size. Aortic Valve: The aortic valve is trileaflet. There is mild to moderate aortic valve thickening. There is no evidence of aortic valve regurgitation. The peak instantaneous gradient of the aortic valve is 9.1 mmHg. Mitral Valve: The mitral valve is moderately thickened. There is trace mitral valve regurgitation. Tricuspid Valve: The tricuspid valve is structurally normal. There is moderate to severe tricuspid regurgitation. The Doppler estimated RVSP is slightly elevated at 32.6 mmHg. Pulmonic Valve: The pulmonic valve is structurally normal. There is trace pulmonic valve regurgitation. Pericardium: There is a small to moderate pericardial effusion posterior to the left ventricle. The effusion is circumferential. Aorta: The aortic root is normal. There is no dilatation of the ascending aorta. There is no dilatation of the aortic root. Systemic Veins: The inferior vena cava appears to be of normal size.  CONCLUSIONS:  1. Left ventricular systolic function is normal with a 60-65% estimated ejection fraction.  2. There is a small to moderate pericardial effusion.  3. The mitral valve is moderately thickened.  4. Moderate to severe tricuspid regurgitation visualized.  5. Slightly elevated RVSP.  6. Left ventricular cavity size is decreased. QUANTITATIVE DATA SUMMARY: 2D MEASUREMENTS:                          Normal Ranges: LAs:           2.70 cm   (2.7-4.0cm) IVSd:          0.89 cm   (0.6-1.1cm) LVPWd:         0.83 cm   (0.6-1.1cm) LVIDd:         3.53 cm   (3.9-5.9cm) LVIDs:         2.50 cm LV Mass Index: 55.6 g/m2 LV % FS        29.2 % LA VOLUME:                             Normal Ranges: LA Volume Index: 10.0 ml/m2 RA VOLUME BY A/L METHOD:                       Normal Ranges: RA Area A4C:  10.0 cm2 M-MODE MEASUREMENTS:                  Normal Ranges: Ao Root: 2.70 cm (2.0-3.7cm) LAs:     3.20 cm (2.7-4.0cm) AORTA MEASUREMENTS:                    Normal Ranges: Asc Ao, d: 2.60 cm (2.1-3.4cm) LV SYSTOLIC FUNCTION BY 2D PLANIMETRY (MOD):                     Normal Ranges: EF-A4C View: 68.6 % (>=55%) EF-A2C View: 53.5 % EF-Biplane:  61.3 % LV DIASTOLIC FUNCTION:                           Normal Ranges: MV Peak E:    0.63 m/s    (0.7-1.2 m/s) MV Peak A:    0.56 m/s    (0.42-0.7 m/s) E/A Ratio:    1.14        (1.0-2.2) MV lateral e' 0.07 m/s MV medial e'  0.10 m/s MV A Dur:     120.00 msec MITRAL VALVE:                 Normal Ranges: MV DT: 208 msec (150-240msec) AORTIC VALVE:                         Normal Ranges: AoV Vmax:      1.51 m/s (<=1.7m/s) AoV Peak P.1 mmHg (<20mmHg) LVOT Max David:  0.90 m/s (<=1.1m/s) LVOT VTI:      14.30 cm LVOT Diameter: 1.90 cm  (1.8-2.4cm) AoV Area,Vmax: 1.69 cm2 (2.5-4.5cm2)  RIGHT VENTRICLE: RV Basal 2.22 cm RV Mid   1.66 cm RV Major 7.1 cm TAPSE:   15.6 mm RV s'    0.15 m/s TRICUSPID VALVE/RVSP:                             Normal Ranges: Peak TR Velocity: 2.72 m/s RV Syst Pressure: 32.6 mmHg (< 30mmHg) PULMONIC VALVE:                      Normal Ranges: PV Max David: 0.7 m/s  (0.6-0.9m/s) PV Max P.1 mmHg  [id number] Jeancarlos Kerr MD Electronically signed on 10/20/2023 at 10:09:08 AM  ** Final **     XR chest 1 view    Result Date: 10/19/2023  Interpreted By:  Sampson Tubbs, STUDY: XR CHEST 1 VIEW;  10/19/2023 3:51 pm   INDICATION: Signs/Symptoms:ams.   COMPARISON: CT abdomen and pelvis dated 10/19/2020 through full CT scan chest in 10/1923. Chest x-ray from 2016.   ACCESSION NUMBER(S): [id number]   ORDERING CLINICIAN: EDWINA RUDD   TECHNIQUE: Single AP portable view of the chest was obtained.   FINDINGS: MEDIASTINUM/ LUNGS/ CARLOS: Trace stable pleural scarring at the lateral right base. No cardiomegaly, vascular congestion, or pleural effusion. No abnormal  opacity in either lung worrisome for tumor or pneumonia. No pneumothorax. No tracheal deviation. No abnormal hilar fullness or gross mass on either side.   BONES: No lytic or blastic destructive bone lesion.   UPPER ABDOMEN: Grossly intact.       Trace stable pleural scarring at the lateral right base. Otherwise, negative exam.   Signed by: Sampson Tubbs 10/19/2023 4:23 PM Dictation workstation:   [id number]    CT angio chest abdomen pelvis    Result Date: 10/19/2023  Interpreted By:  Jude Calhoun, STUDY: CT ANGIO CHEST ABDOMEN PELVIS;  10/19/2023 3:24 pm   INDICATION: Signs/Symptoms:AMS CP   COMPARISON: None.   ACCESSION NUMBER(S): [id number]   ORDERING CLINICIAN: EDWINA RUDD   TECHNIQUE: CT of the chest, abdomen, and pelvis was performed. Contiguous axial images were obtained at  5 mm slice thickness through the chest, and at  3 mm through the abdomen and pelvis. Coronal and sagittal MIP reconstructions were obtained and reviewed.. 80 cc Omnipaque 350 injected intravenously.   FINDINGS: CHEST:   No filling defects in the pulmonary arteries suggest pulmonary embolism. Intact thoracic aorta with no aneurysm or dissection. Trace pericardial fluid. No pleural effusion. No adenopathy. No pneumothorax. No pulmonary consolidation, mass or suspicious nodule. No acute osseous abnormality in the thoracic cage. Bone island in T3 vertebral body.   ABDOMEN AND PELVIS:   Vascular calcification is noted. No aneurysm or dissection in the abdominal aorta and its major branches. Diffuse hepatic steatosis. Unremarkable spleen, pancreas, adrenals, and bilateral kidneys. Mucosal hyperenhancement of stomach and duodenal is seen, suspicious for gastroduodenitis. Bowel loops nonobstructed. Normal appendix. No free air or free fluid. No adenopathy. Sections through the pelvis demonstrate a grossly unremarkable urinary bladder. No adnexal mass.       No evidence of pulmonary embolism.   No thoracoabdominal aortic aneurysm or  dissection.   No pneumonia.   Marked diffuse hepatic steatosis.   Questionable gastroduodenitis.   No evidence of bowel obstruction.   Normal appendix.   MACRO: None   Signed by: Jude Calhoun 10/19/2023 4:11 PM Dictation workstation:   [id number]    CT head wo IV contrast    Result Date: 10/19/2023  Interpreted By:  Anthony Reyes, STUDY: CT HEAD WO IV CONTRAST;  10/19/2023 3:23 pm   INDICATION: ams.   COMPARISON: None.   ACCESSION NUMBER(S): [id number]   ORDERING CLINICIAN: ANTHONY RUDD   TECHNIQUE: Contiguous unenhanced axial CT sections are performed from the skull base to the vertex.   FINDINGS: The osseous structures are intact. The visualized portions of the paranasal sinuses and mastoid air cells are clear.   The cortical sulci and CSF spaces are symmetric in appearance. There is no sign of parenchymal hematoma or dense extra-axial fluid collection. There is no edema or mass effect. The gray matter/white-matter differentiation is preserved.       No CT evidence of acute intracranial abnormality.   Signed by: Anthony Reyes 10/19/2023 3:32 PM Dictation workstation:   [id number]    Transthoracic Echo (TTE) Complete    Result Date: 10/20/2023    [facility], [address] [place], [place] [address]Tel [phone] and                                 Fax [phone] TRANSTHORACIC ECHOCARDIOGRAM REPORT  Patient Name:     RENEE KHAN ELIAN [place] Physician:   [id number] Jeancarlos Kerr MD Study Date:       10/20/2023          Ordering Provider:   [id number] MICHAEL MAZA MRN/PID:          [id number]            Fellow: Accession#:       [id number]        Nurse: Date of           1960 / 63      Sonographer:         Peter Edwards [facility] Birth/Age:        years Gender:           F                   Additional Staff: Height:           167.64 cm           Admit Date:          10/19/2023 Weight:           48.08 kg            Admission Status:     Inpatient - Routine BSA:              1.53 m2             Encounter#:          [id number]                                       Department Location: [facility] Blood Pressure: 86 /54 mmHg Study Type:    TRANSTHORACIC ECHO (TTE) COMPLETE Diagnosis/ICD: Cardiac murmur, unspecified-R01.1 CPT Code:      Echo Complete w Full Doppler-02077; Myocardial Strain                Imaging-31156 Patient History: Pertinent History: Murmur. Study Detail: The following Echo studies were performed: 2D, M-Mode, Doppler,               color flow and Strain. Technically challenging study due to body               habitus and patient lying in supine position.  PHYSICIAN INTERPRETATION: Left Ventricle: The left ventricular systolic function is normal, with an estimated ejection fraction of 60-65%. There are no regional wall motion abnormalities. The left ventricular cavity size is decreased. Left ventricular diastolic filling was indeterminate. Left Atrium: The left atrium is normal in size. Right Ventricle: The right ventricle is normal in size. There is normal right ventricular global systolic function. RV anterior free wall appears thickened. Right Atrium: The right atrium is normal in size. Aortic Valve: The aortic valve is trileaflet. There is mild to moderate aortic valve thickening. There is no evidence of aortic valve regurgitation. The peak instantaneous gradient of the aortic valve is 9.1 mmHg. Mitral Valve: The mitral valve is moderately thickened. There is trace mitral valve regurgitation. Tricuspid Valve: The tricuspid valve is structurally normal. There is moderate to severe tricuspid regurgitation. The Doppler estimated RVSP is slightly elevated at 32.6 mmHg. Pulmonic Valve: The pulmonic valve is structurally normal. There is trace pulmonic valve regurgitation. Pericardium: There is a small to moderate pericardial effusion posterior to the left ventricle. The effusion is circumferential. Aorta: The aortic root is  normal. There is no dilatation of the ascending aorta. There is no dilatation of the aortic root. Systemic Veins: The inferior vena cava appears to be of normal size.  CONCLUSIONS:  1. Left ventricular systolic function is normal with a 60-65% estimated ejection fraction.  2. There is a small to moderate pericardial effusion.  3. The mitral valve is moderately thickened.  4. Moderate to severe tricuspid regurgitation visualized.  5. Slightly elevated RVSP.  6. Left ventricular cavity size is decreased. QUANTITATIVE DATA SUMMARY: 2D MEASUREMENTS:                          Normal Ranges: LAs:           2.70 cm   (2.7-4.0cm) IVSd:          0.89 cm   (0.6-1.1cm) LVPWd:         0.83 cm   (0.6-1.1cm) LVIDd:         3.53 cm   (3.9-5.9cm) LVIDs:         2.50 cm LV Mass Index: 55.6 g/m2 LV % FS        29.2 % LA VOLUME:                             Normal Ranges: LA Volume Index: 10.0 ml/m2 RA VOLUME BY A/L METHOD:                       Normal Ranges: RA Area A4C: 10.0 cm2 M-MODE MEASUREMENTS:                  Normal Ranges: Ao Root: 2.70 cm (2.0-3.7cm) LAs:     3.20 cm (2.7-4.0cm) AORTA MEASUREMENTS:                    Normal Ranges: Asc Ao, d: 2.60 cm (2.1-3.4cm) LV SYSTOLIC FUNCTION BY 2D PLANIMETRY (MOD):                     Normal Ranges: EF-A4C View: 68.6 % (>=55%) EF-A2C View: 53.5 % EF-Biplane:  61.3 % LV DIASTOLIC FUNCTION:                           Normal Ranges: MV Peak E:    0.63 m/s    (0.7-1.2 m/s) MV Peak A:    0.56 m/s    (0.42-0.7 m/s) E/A Ratio:    1.14        (1.0-2.2) MV lateral e' 0.07 m/s MV medial e'  0.10 m/s MV A Dur:     120.00 msec MITRAL VALVE:                 Normal Ranges: MV DT: 208 msec (150-240msec) AORTIC VALVE:                         Normal Ranges: AoV Vmax:      1.51 m/s (<=1.7m/s) AoV Peak P.1 mmHg (<20mmHg) LVOT Max David:  0.90 m/s (<=1.1m/s) LVOT VTI:      14.30 cm LVOT Diameter: 1.90 cm  (1.8-2.4cm) AoV Area,Vmax: 1.69 cm2 (2.5-4.5cm2)  RIGHT VENTRICLE: RV Basal 2.22 cm RV Mid    1.66 cm RV Major 7.1 cm TAPSE:   15.6 mm RV s'    0.15 m/s TRICUSPID VALVE/RVSP:                             Normal Ranges: Peak TR Velocity: 2.72 m/s RV Syst Pressure: 32.6 mmHg (< 30mmHg) PULMONIC VALVE:                      Normal Ranges: PV Max David: 0.7 m/s  (0.6-0.9m/s) PV Max P.1 mmHg  [id number] Jeancarlos Kerr MD Electronically signed on 10/20/2023 at 10:09:08 AM  ** Final **     XR chest 1 view    Result Date: 10/19/2023  Interpreted By:  Sampson Tubbs, STUDY: XR CHEST 1 VIEW;  10/19/2023 3:51 pm   INDICATION: Signs/Symptoms:ams.   COMPARISON: CT abdomen and pelvis dated 10/19/2020 through full CT scan chest in 10/1923. Chest x-ray from 2016.   ACCESSION NUMBER(S): [id number]   ORDERING CLINICIAN: EDWINA RUDD   TECHNIQUE: Single AP portable view of the chest was obtained.   FINDINGS: MEDIASTINUM/ LUNGS/ CARLOS: Trace stable pleural scarring at the lateral right base. No cardiomegaly, vascular congestion, or pleural effusion. No abnormal opacity in either lung worrisome for tumor or pneumonia. No pneumothorax. No tracheal deviation. No abnormal hilar fullness or gross mass on either side.   BONES: No lytic or blastic destructive bone lesion.   UPPER ABDOMEN: Grossly intact.       Trace stable pleural scarring at the lateral right base. Otherwise, negative exam.   Signed by: Sampson Tubbs 10/19/2023 4:23 PM Dictation workstation:   [id number]    CT angio chest abdomen pelvis    Result Date: 10/19/2023  Interpreted By:  Jude Calhoun, STUDY: CT ANGIO CHEST ABDOMEN PELVIS;  10/19/2023 3:24 pm   INDICATION: Signs/Symptoms:AMS CP   COMPARISON: None.   ACCESSION NUMBER(S): [id number]   ORDERING CLINICIAN: EDWINA RUDD   TECHNIQUE: CT of the chest, abdomen, and pelvis was performed. Contiguous axial images were obtained at  5 mm slice thickness through the chest, and at  3 mm through the abdomen and pelvis. Coronal and sagittal MIP reconstructions were obtained and reviewed.. 80 cc Omnipaque 350  injected intravenously.   FINDINGS: CHEST:   No filling defects in the pulmonary arteries suggest pulmonary embolism. Intact thoracic aorta with no aneurysm or dissection. Trace pericardial fluid. No pleural effusion. No adenopathy. No pneumothorax. No pulmonary consolidation, mass or suspicious nodule. No acute osseous abnormality in the thoracic cage. Bone island in T3 vertebral body.   ABDOMEN AND PELVIS:   Vascular calcification is noted. No aneurysm or dissection in the abdominal aorta and its major branches. Diffuse hepatic steatosis. Unremarkable spleen, pancreas, adrenals, and bilateral kidneys. Mucosal hyperenhancement of stomach and duodenal is seen, suspicious for gastroduodenitis. Bowel loops nonobstructed. Normal appendix. No free air or free fluid. No adenopathy. Sections through the pelvis demonstrate a grossly unremarkable urinary bladder. No adnexal mass.       No evidence of pulmonary embolism.   No thoracoabdominal aortic aneurysm or dissection.   No pneumonia.   Marked diffuse hepatic steatosis.   Questionable gastroduodenitis.   No evidence of bowel obstruction.   Normal appendix.   MACRO: None   Signed by: Jude Calhoun 10/19/2023 4:11 PM Dictation workstation:   [id number]    CT head wo IV contrast    Result Date: 10/19/2023  Interpreted By:  Anthony Reyes, STUDY: CT HEAD WO IV CONTRAST;  10/19/2023 3:23 pm   INDICATION: ams.   COMPARISON: None.   ACCESSION NUMBER(S): [id number]   ORDERING CLINICIAN: ANTHONY RUDD   TECHNIQUE: Contiguous unenhanced axial CT sections are performed from the skull base to the vertex.   FINDINGS: The osseous structures are intact. The visualized portions of the paranasal sinuses and mastoid air cells are clear.   The cortical sulci and CSF spaces are symmetric in appearance. There is no sign of parenchymal hematoma or dense extra-axial fluid collection. There is no edema or mass effect. The gray matter/white-matter differentiation is preserved.        No CT evidence of acute intracranial abnormality.   Signed by: Anthony Reyes 10/19/2023 3:32 PM Dictation workstation:   [id number]             Assessment/Plan   This patient currently has cardiac telemetry ordered; if you would like to modify or discontinue the telemetry order, click here to go to the orders activity to modify/discontinue the order.    Principal Problem:    DKA, type 2, not at goal (CMS/[facility])  Assessment + Plan:   Ivelisse Paulino is a 63-year-old female with a past medical history of type II DM presenting with DKA episode.  Patient was found to have some abdominal pain and nausea along with chest discomfort.  Patient has sustained ketoacidosis with elevated ketones and elevated anion gap with elevated blood sugars.  Patient is being admitted to ICU for further medical management    Daily progress:  10/21: Blood sugars controlled.  Blood pressure 90 systolic.  Continues to be on low-dose levo 0.1.  Attempt to wean Levophed and add midodrine 5 mg 3 times daily.  Off pressors and blood pressure controlled with MAP greater than 65 can transfer from ICU.  Continue vancomycin and meropenem.  Creatinine 1.08, BUN 21.  Urine output 1.4 L.  Continue 12 units of Lantus once daily and sliding scale insulin.  10/22: Blood sugars controlled.  Blood pressure seen.  Continues to be on low-dose levo.  Increase midodrine to 15 mg 3 times daily and continue to attempt to wean Levophed when off pressors and blood pressure control then can transfer to ICU.  Continue Lantus 12 units and sliding scale.  Continue meropenem and vancomycin.  Increase midodrine to 15 mg 3 times daily and give 1 L LR IV fluid.  Attempt to wean off Levophed.  CRP 16, procalcitonin 7 reported. blood culture shows gram-positive cocci in clusters.  Follow blood cultures.  C-peptide 0.28 which is low.  Autoimmune diabetes mellitus likely diagnosis     Neuro:  #Acute metabolic encephalopathy in setting of DKA-resolved  -Alert and  oriented x3  - Monitor vitals  - Maintain sleep hygiene  - Monitor for ICU delirium     CV:  #HTN  #HLD  - Patient not on any antihypertensives  - Requires low-dose Lopressor.  Increase midodrine to 15 mg 3 times daily  - Give 1 L LR IV fluids  #Systolic murmur  - Follow-up blood cultures  -Echo which does not show any evidence of endocarditis EF 66 5%  - Continue antibiotics including vancomycin and meropenem     Respiratory:  -No acute respiratory distress.  Patient does not require any oxygen     GI:  - Eating and drinking okay  -Protonix for stress ulcer prophylaxis     Endo:  #DKA-resolved  #Type II DM  - DKA episode resolved.  -Patient eating and drinking okay  - Patient to continue taking 12 units of Lantus once daily and keep on sliding scale  - Not to continue metformin on discharge  - Endocrinology consult: C-peptide 0.28-low.  Patient likely has autoimmune diabetes mellitus with background of SLE.   - HbA1c 15.3 indicates poorly controlled type II DM.  Patient needs to be on insulin regimen before being discharged     Renal:  #KLEBER-resolved  #Hyponatremia-resolved  - Renal function stable  - Monitor BMP and optimize electrolyte     Hematological:  #Leukocytosis  #Neutrophilia  #Macrocytic anemia  - No indication of any infection.  Patient afebrile  - Monitor CBC     ID:  #Leukocytosis-resolved  #Neutrophilia-resolved  -Systolic murmur present.  Afebrile.    - Echo shows 60 to 65% ejection fraction, moderate to severe tricuspid regurgitation, mitral valve is moderately thickened, no evidence of endocarditis  - CRP 15.5.  Procalcitonin 6.9  - Continue IV vancomycin and meropenem     Vent/O2: None  Lines/Devices: IV peripheral  Drips: None  ATBx: None  Fluids: None  Diet: Regular diet  PPX: Protonix  Code status: Full code  Dispo: ICU      Albert Cortes MD  PGY1 Internal Medicine          CRITICAL CARE MEDICINE ATTENDING NOTE    I saw and evaluated the patient. I personally obtained the key and critical portions  of the history and physical examination. I reviewed the resident's documentation and discussed the patient with the resident. I agree with the resident's medical decision making as documented in the resident's note.    The patient has high probability of sudden, clinically significant deterioration, which requires urgent interventions. I managed/supervised life supporting interventions that required frequent physician assessment. I spent 40 critical care minutes of my full attention on this patient's management and direct patient care. Time I spent with family or surrogate(s) is included if the patient was incapable of providing information or participating in medical decision making. Time devoted to teaching and to any procedures I billed separately is not included. Medical issues requiring critical care management include:    Ivelisse Paulino is a 63 y.o. female on day 3 of admission presenting with DKA, type 2, not at goal (CMS/[facility]).     1) Septic shock secondary to gram positive bacteremia  --Antibiotics: Vancomycin and meropenem  --Cultures reviewed   --Vasopressor support with norepinephrine drip  --Continue midodrine  --Cortisol level was normal --> no steroids     2) Uncontrolled diabetes mellitus  --Mangement with insulin      3) Acute kidney injury secondary to  Sepsis /prerenal  --Avoid nephrotoxins and dose medications according GFR     4) Encephalopathy  --Metabolic in nature  --Reorientation      DVT prophylaxis with heprin subcutaneous    Mariela Up MD  Pulmonary and Critical Care Medicine

## 2023-10-22 NOTE — CARE PLAN
Problem: Fall/Injury  Goal: Be free from injury by end of the shift  Outcome: Progressing  Goal: Verbalize understanding of personal risk factors for fall in the hospital  Outcome: Progressing     Problem: Pain  Goal: My pain/discomfort is manageable  Outcome: Progressing     Problem: Safety  Goal: Patient will be injury free during hospitalization  Outcome: Progressing  Goal: I will remain free of falls  Outcome: Progressing     Problem: Daily Care  Goal: Daily care needs are met  Outcome: Progressing     Problem: Discharge Barriers  Goal: My discharge needs are met  Outcome: Progressing     Problem: Diabetes  Goal: Achieve decreasing blood glucose levels by end of shift  Outcome: Progressing  Goal: Increase stability of blood glucose readings by end of shift  Outcome: Progressing  Goal: Decrease in ketones present in urine by end of shift  Outcome: Progressing  Goal: Maintain electrolyte levels within acceptable range throughout shift  Outcome: Progressing  Goal: Maintain glucose levels >70mg/dl to <250mg/dl throughout shift  Outcome: Progressing  Goal: No changes in neurological exam by end of shift  Outcome: Progressing  Goal: Learn about and adhere to nutrition recommendations by end of shift  Outcome: Progressing  Goal: Vital signs within normal range for age by end of shift  Outcome: Progressing  Goal: Increase self care and/or family involovement by end of shift  Outcome: Progressing  Goal: Receive DSME education by end of shift  Outcome: Progressing     Problem: Psychosocial Needs  Goal: Demonstrates ability to cope with hospitalization/illness  Outcome: Progressing  Goal: Collaborate with me, my family, and caregiver to identify my specific goals  Outcome: Progressing   The patient's goals for the shift include      The clinical goals for the shift include Patient will remain off insulin drip and maintain blood glucose  through 10/20/23 1900

## 2023-10-23 LAB
ANION GAP SERPL CALC-SCNC: 9 MMOL/L (ref 10–20)
BUN SERPL-MCNC: 25 MG/DL (ref 6–23)
CALCIUM SERPL-MCNC: 9 MG/DL (ref 8.6–10.3)
CHLORIDE SERPL-SCNC: 105 MMOL/L (ref 98–107)
CO2 SERPL-SCNC: 30 MMOL/L (ref 21–32)
CREAT SERPL-MCNC: 0.99 MG/DL (ref 0.5–1.05)
ERYTHROCYTE [DISTWIDTH] IN BLOOD BY AUTOMATED COUNT: 13 % (ref 11.5–14.5)
GFR SERPL CREATININE-BSD FRML MDRD: 64 ML/MIN/1.73M*2
GLUCOSE BLD MANUAL STRIP-MCNC: 190 MG/DL (ref 74–99)
GLUCOSE BLD MANUAL STRIP-MCNC: 192 MG/DL (ref 74–99)
GLUCOSE BLD MANUAL STRIP-MCNC: 194 MG/DL (ref 74–99)
GLUCOSE BLD MANUAL STRIP-MCNC: 243 MG/DL (ref 74–99)
GLUCOSE SERPL-MCNC: 130 MG/DL (ref 74–99)
HCT VFR BLD AUTO: 32.7 % (ref 36–46)
HGB BLD-MCNC: 11 G/DL (ref 12–16)
MCH RBC QN AUTO: 34.1 PG (ref 26–34)
MCHC RBC AUTO-ENTMCNC: 33.6 G/DL (ref 32–36)
MCV RBC AUTO: 101 FL (ref 80–100)
NRBC BLD-RTO: 0 /100 WBCS (ref 0–0)
PLATELET # BLD AUTO: 206 X10*3/UL (ref 150–450)
PMV BLD AUTO: 9.8 FL (ref 7.5–11.5)
POTASSIUM SERPL-SCNC: 3.8 MMOL/L (ref 3.5–5.3)
RBC # BLD AUTO: 3.23 X10*6/UL (ref 4–5.2)
SODIUM SERPL-SCNC: 140 MMOL/L (ref 136–145)
VANCOMYCIN TROUGH SERPL-MCNC: 19.6 UG/ML (ref 5–20)
WBC # BLD AUTO: 3.9 X10*3/UL (ref 4.4–11.3)

## 2023-10-23 PROCEDURE — 2500000004 HC RX 250 GENERAL PHARMACY W/ HCPCS (ALT 636 FOR OP/ED)

## 2023-10-23 PROCEDURE — 84145 PROCALCITONIN (PCT): CPT | Mod: CMCLAB,PARLAB

## 2023-10-23 PROCEDURE — 2020000001 HC ICU ROOM DAILY

## 2023-10-23 PROCEDURE — 36415 COLL VENOUS BLD VENIPUNCTURE: CPT | Mod: PARLAB

## 2023-10-23 PROCEDURE — 2500000002 HC RX 250 W HCPCS SELF ADMINISTERED DRUGS (ALT 637 FOR MEDICARE OP, ALT 636 FOR OP/ED)

## 2023-10-23 PROCEDURE — 96372 THER/PROPH/DIAG INJ SC/IM: CPT

## 2023-10-23 PROCEDURE — 96372 THER/PROPH/DIAG INJ SC/IM: CPT | Performed by: INTERNAL MEDICINE

## 2023-10-23 PROCEDURE — 85027 COMPLETE CBC AUTOMATED: CPT

## 2023-10-23 PROCEDURE — 82947 ASSAY GLUCOSE BLOOD QUANT: CPT

## 2023-10-23 PROCEDURE — 2500000001 HC RX 250 WO HCPCS SELF ADMINISTERED DRUGS (ALT 637 FOR MEDICARE OP): Performed by: STUDENT IN AN ORGANIZED HEALTH CARE EDUCATION/TRAINING PROGRAM

## 2023-10-23 PROCEDURE — 36415 COLL VENOUS BLD VENIPUNCTURE: CPT

## 2023-10-23 PROCEDURE — 80048 BASIC METABOLIC PNL TOTAL CA: CPT

## 2023-10-23 PROCEDURE — 80202 ASSAY OF VANCOMYCIN: CPT | Performed by: HOSPITALIST

## 2023-10-23 PROCEDURE — 2500000002 HC RX 250 W HCPCS SELF ADMINISTERED DRUGS (ALT 637 FOR MEDICARE OP, ALT 636 FOR OP/ED): Performed by: INTERNAL MEDICINE

## 2023-10-23 PROCEDURE — 2500000004 HC RX 250 GENERAL PHARMACY W/ HCPCS (ALT 636 FOR OP/ED): Performed by: HOSPITALIST

## 2023-10-23 PROCEDURE — 99232 SBSQ HOSP IP/OBS MODERATE 35: CPT

## 2023-10-23 RX ORDER — INSULIN LISPRO 100 [IU]/ML
0-10 INJECTION, SOLUTION INTRAVENOUS; SUBCUTANEOUS
Status: DISCONTINUED | OUTPATIENT
Start: 2023-10-23 | End: 2023-10-25 | Stop reason: HOSPADM

## 2023-10-23 RX ORDER — POLYETHYLENE GLYCOL 3350 17 G/17G
17 POWDER, FOR SOLUTION ORAL DAILY
Status: DISCONTINUED | OUTPATIENT
Start: 2023-10-23 | End: 2023-10-25 | Stop reason: HOSPADM

## 2023-10-23 RX ADMIN — HEPARIN SODIUM 5000 UNITS: 5000 INJECTION INTRAVENOUS; SUBCUTANEOUS at 05:21

## 2023-10-23 RX ADMIN — PANTOPRAZOLE SODIUM 40 MG: 40 TABLET, DELAYED RELEASE ORAL at 06:00

## 2023-10-23 RX ADMIN — MIDODRINE HYDROCHLORIDE 15 MG: 10 TABLET ORAL at 04:11

## 2023-10-23 RX ADMIN — INSULIN LISPRO 2 UNITS: 100 INJECTION, SOLUTION INTRAVENOUS; SUBCUTANEOUS at 08:26

## 2023-10-23 RX ADMIN — MIDODRINE HYDROCHLORIDE 15 MG: 10 TABLET ORAL at 19:00

## 2023-10-23 RX ADMIN — HEPARIN SODIUM 5000 UNITS: 5000 INJECTION INTRAVENOUS; SUBCUTANEOUS at 21:16

## 2023-10-23 RX ADMIN — INSULIN LISPRO 2 UNITS: 100 INJECTION, SOLUTION INTRAVENOUS; SUBCUTANEOUS at 08:29

## 2023-10-23 RX ADMIN — POLYETHYLENE GLYCOL 3350 17 G: 17 POWDER, FOR SOLUTION ORAL at 08:25

## 2023-10-23 RX ADMIN — INSULIN LISPRO 2 UNITS: 100 INJECTION, SOLUTION INTRAVENOUS; SUBCUTANEOUS at 12:11

## 2023-10-23 RX ADMIN — INSULIN LISPRO 2 UNITS: 100 INJECTION, SOLUTION INTRAVENOUS; SUBCUTANEOUS at 20:41

## 2023-10-23 RX ADMIN — INSULIN LISPRO 4 UNITS: 100 INJECTION, SOLUTION INTRAVENOUS; SUBCUTANEOUS at 17:24

## 2023-10-23 RX ADMIN — MEROPENEM 1 G: 1 INJECTION, POWDER, FOR SOLUTION INTRAVENOUS at 08:31

## 2023-10-23 RX ADMIN — MEROPENEM 1 G: 1 INJECTION, POWDER, FOR SOLUTION INTRAVENOUS at 20:34

## 2023-10-23 RX ADMIN — MIDODRINE HYDROCHLORIDE 15 MG: 10 TABLET ORAL at 11:30

## 2023-10-23 RX ADMIN — INSULIN LISPRO 2 UNITS: 100 INJECTION, SOLUTION INTRAVENOUS; SUBCUTANEOUS at 17:24

## 2023-10-23 RX ADMIN — VANCOMYCIN HYDROCHLORIDE 1250 MG: 1.25 INJECTION, POWDER, LYOPHILIZED, FOR SOLUTION INTRAVENOUS at 21:16

## 2023-10-23 RX ADMIN — INSULIN GLARGINE 12 UNITS: 100 INJECTION, SOLUTION SUBCUTANEOUS at 08:29

## 2023-10-23 ASSESSMENT — COGNITIVE AND FUNCTIONAL STATUS - GENERAL
CLIMB 3 TO 5 STEPS WITH RAILING: A LITTLE
MOBILITY SCORE: 23

## 2023-10-23 ASSESSMENT — PAIN SCALES - GENERAL
PAINLEVEL_OUTOF10: 0 - NO PAIN

## 2023-10-23 ASSESSMENT — PAIN - FUNCTIONAL ASSESSMENT
PAIN_FUNCTIONAL_ASSESSMENT: 0-10

## 2023-10-23 NOTE — PROGRESS NOTES
"    Endocrinology Inpatient Consult Progress Note     PATIENT NAME: Ivelisse Paulino  MRN: [id number]  DATE: 10/23/2023    CONSULTING PHYSICIAN: Dr. Rush  REASON FOR CONSULT: DKA. T1DM.      Interval Events     No acute events overnight.  Patient states that she feels infinitely better.  She states that she has less polyuria, urgency, nocturia, and polydipsia.  Excited to learn about injecting insulin today.      Physical Examination     /63   Pulse 53   Temp 36.4 °C (97.5 °F) (Temporal)   Resp 15   Ht 1.7 m (5' 6.93\")   Wt 54.3 kg (119 lb 11.4 oz)   SpO2 96%   BMI 18.79 kg/m²   No acute distress.   RRR.  Nonlabored respiration.  Soft nontender nondistended abdomen.  No pedal edema bilaterally.  Appropriate affect.      Medications     Reviewed MAR       Data     Recent Labs and Imaging Reviewed    Date  PreBreakfast Pre Lunch Pre Dinner Bedtime 3AM    10/20 187 (G12, H4+4) 71 218 (H4+8) 127    10/21 228 (G12, H4+4) 65 183 (H+4) 177 (H+4)    10/22 249 (G12, H2+12?) 162 (H2+4) 147 (H2) 186 (H2?+4)    10/23            Assessment / Plan        # Type 1 Diabetes Mellitus  HbA1c (10/23): 15.3  Home Regimen: Metformin.  Presented in [place] DKA.     C Peptide noted to be 0.28 glucose of 204 on this admission.  Patient is not making insulin. Will treat as T1DM physiology.    -Continue glargine 12 units in the morning  -Continue lispro 2 units with each meal, discussed with RN at the bedside  -Continue lispro sliding scale with meals and at bedtime  -We will need diabetes education and starting on a multiple daily injection program.  -Follow T1DM Abs  -No metformin on discharge.     # KLEBER: Improving isotonic fluid administration.  Likely prerenal in nature with osmotic diuresis from hyperglycemia.     # Hypothyroidism: Maintained on levothyroxine.  Reviewed TSH was only mildly elevated to 4.78 in the setting of acute illness.  No intervention at this time.  Continue same levothyroxine dose.     # NAFLD: " Incidentally seen on cross-sectional imaging.  She surely does not have the phenotype for the same.     #Encephalopathy: Resolved. Likely 2/2 DKA.    #Hypertension: Reviewed urine output creatinine ratio was found to be 113.8.  This is the setting of an KLEBER.  Perhaps we can recheck this later once her renal function improves.     # Dyslipidemia: We will explore more when she is more clinically stable.    # Severe Protein Calorie Malnutrion: There was question that she could have adrenal insufficiency given her likely type 1 diabetes mellitus as well as SLE.  We did obtain a morning cortisol yesterday which was found to be 36.3.  This is reassuring against adrenal insufficiency.  No indication for glucocorticoids.  Nutrition following.       Donny Garcia, DO  Endocrinology, Diabetes, and Metabolism    Available via EPIC Messenger

## 2023-10-23 NOTE — NURSING NOTE
10/23/23 5010 Patient Navigator  I introduced myself to the patient and explained my role. Pt states was diagnosed with diabetes this year. She states she can afford her medications and diabetic supplies. We discussed the  Diabetes Management Guide, the handouts listed below, & the information in the Education Tab- Diabetes. I informed the patient of the importance of ADA recommendations of 150 min of exercise once a week which she states she will attempt when physically able. I reviewed with the patient her A1C- 15.3  and what that value indicate. I reviewed with the patient the importance of yearly eye exams with pupil dilation & having her feet examined by a health professional. We reviewed healthy eating meal plans along with other food options.    I instructed the patient how to use a insulin pen with a sliding scale. She was able to demonstrate back to me without difficulty. I explained the difference between long and rapid insulin. The patient is eager to make positive lifestyle changes and has support from others. She declined any other needs or questions. The pt received my contact information & informed to call or email as needed. The patient verbalized understanding of the above note & appreciated my visit alone with the information I provided. I updated the patient's RN of my visit.    Handouts:  What can I eat? American Diabetes Association  Planning healthy meals- Cristina Nordisk   Diabetes Guide    Faye RUST RN  Patient Navigator  Stroke Educator  Diabetes Care &

## 2023-10-23 NOTE — PROGRESS NOTES
"   10/23/23 8876   Discharge Planning   Living Arrangements Alone   Support Systems Friends/neighbors   Assistance Needed independent at baseline   Type of Residence Private residence     I met with patient at her bedside, verified insurance and demographics.  Patient lives alone.  She does not use mobility aids, denies falls, drives and is independent with ADLs at baseline.  We discussed her AMPAC score (12/11) and PT/ot recommendations.  Patient feels she was quite ill at time of evals and stated she is much stronger now, back to her baseline.  Patient reported she has been dancing and doing squats in the hospital room.  Patient declined HHC for therapies but stated she would benefit from having a nurse check on her occasionally.  She declined a list of HHC agencies, stated to assign her \"a good one\".  Patient is unable to recall the name of her PCP, stated she has only seen him 1x.  Per patient's request, this TCC to ask Dr Tubbs if he would be willing to follow her for HHC as managing her diabetes is the reason for home health care.  Care Coordination team following.  Mi SENIOR TCC  "

## 2023-10-23 NOTE — PROGRESS NOTES
Subjective   Ivelisse Paulino is a 63 y.o. female who presents with Chest Pain and Altered Mental Status (Patient arrives from home w/ complaints of chestpain 9/10 and confusion x2.   Patient from home).    Patient seen at bedside.  No acute concerns.  Patient denies any nausea, vomiting.    Objective   Vitals:    10/23/23 1600   BP:    Pulse: 76   Resp: 19   Temp: 35.9 °C (96.6 °F)   SpO2:       Physical Exam  Physical Exam:  Constitutional: well developed, awake, alert, no acute distress  ENMT: mucous membranes moist, EOMI, conjunctivae clear  Head/Neck: normocephalic, atraumatic; supple, trachea midline  Respiratory/Thorax: patent airways, CTAB; no wheezes, rales, or rhonchi  Cardiovascular: RRR, no murmur  Gastrointestinal: soft, nondistended, non-tender, bowel sounds appreciated  Extremities: palpable peripheral pulses, no edema or cyanosis  Neurological: AO x3, no focal deficits  Psychological: appropriate mood and behavior  Skin: warm and dry    Assessment/Plan     Ivelisse Paulino is a 63-year-old female with a past medical history of type II DM presenting with DKA episode.  Patient was found to have some abdominal pain and nausea along with chest discomfort.  Patient has sustained ketoacidosis with elevated ketones and elevated anion gap with elevated blood sugars.  Patient is being admitted to ICU for further medical management.    Neuro:  #Acute metabolic encephalopathy, resolved  -Alert and oriented x3  -Maintain safe hygiene  -Monitor for ICU delirium  -Weaned off Levophed, currently on midodrine    Cardiovascular  #Hypertension  #Hyperlipidemia  -Echo showed EF of 60-65% with no evidence of endocarditis  -Maintain MAPs>65  -Midodrine 15mg Q8H     Pulmonary:  -No acute respiratory distress, patient has not required any oxygen    GI:  -Protonix for stress ulcer prophylaxis  -Tolerating diet well    Renal:   #KLEBER, resolved  #Hyponatremia, resolved  -Renal function stable, monitor BMP and optimize  electrolytes    Endocrine:  #DKA, resolved  #Previous diagnosis of type 2 diabetes, suspect autoimmune diabetes  #SLE  #Hypothyroidism  -Endocrinology consulted, suspect autoimmune diabetes, autoimmune antibodies were  -Hemoglobin A1c 15.3  -Continue 12 units Lantus, and sliding scale    Heme/Onc:  #Macrocytic anemia  -No indication of infection, patient afebrile    ID:  #Leukocytosis, resolved  -Blood cultures collected, results pending  -Continue vanco and meropenem, trend blood cultures    Skin/MSK:  -No signs of skin infection    ICU CHECK LIST:   Antimicrobials: Vancomycin, meropenem  Oxygen:-  Feeding: Diabetic diet  Fluids:-  Analgesia:-  Sedation:-  Thromboprophylaxis: Heparin  Ulcer prophylaxis: Protonix  Glycemic control: Lantus, SSI  Bowel care: MiraLAX  Indwelling catheters:-  Lines: Peripheral    Code Status: Full code    This is a preliminary note written by the resident. Please wait for attending addendum for finalization of note and recommendations.    Elier Ashley DO, PhD  Internal Medicine PGY1

## 2023-10-23 NOTE — PROGRESS NOTES
Vancomycin Dosing by Pharmacy- FOLLOW UP    Ivelisse Paulino is a 63 y.o. year old female who Pharmacy has been consulted for vancomycin dosing for bacteremia. Based on the patient's indication and renal status this patient is being dosed based on a goal AUC of 400-600.     Renal function is currently stable.    Current vancomycin dose: 1,250 mg given every 24 hours    Estimated vancomycin AUC on current dose: 547 mg/L.hr     Visit Vitals  BP 85/56   Pulse 80   Temp 35.9 °C (96.6 °F) (Temporal)   Resp 23        Lab Results   Component Value Date    CREATININE 0.99 10/23/2023    CREATININE 0.96 10/22/2023    CREATININE 1.08 (H) 10/21/2023    CREATININE 1.14 (H) 10/20/2023        Patient weight is 54.3 kg    I/O last 3 completed shifts:  In: 3512.7 (64.7 mL/kg) [P.O.:1800; I.V.:262.7 (4.8 mL/kg); IV Piggyback:1450]  Out: 1850 (34.1 mL/kg) [Urine:1850 (0.9 mL/kg/hr)]  Weight: 54.3 kg   [unfilled]    Lab Results   Component Value Date    PATIENTTEMP 37.0 10/20/2023    PATIENTTEMP 37.0 10/19/2023    PATIENTTEMP 37.0 10/19/2023        Assessment/Plan    Within goal AUC range. Continue current vancomycin regimen.    This dosing regimen is predicted by [employer] to result in the following pharmacokinetic parameters:    AUC24,ss: 547 mg/L.hr  Probability of AUC24 > 400: 97 %  Ctrough,ss: 15.3 mg/L  Probability of Ctrough,ss > 20: 17 %  Probability of nephrotoxicity (Lodise XI 2009): 11 %      The next level will be obtained on 10/27/23 with am labs. May be obtained sooner if clinically indicated.   Will continue to monitor renal function daily while on vancomycin and order serum creatinine at least every 48 hours if not already ordered.  Follow for continued vancomycin needs, clinical response, and signs/symptoms of toxicity.       Darrin Mcguire, PharmD

## 2023-10-24 LAB
ANION GAP SERPL CALC-SCNC: 7 MMOL/L (ref 10–20)
BACTERIA BLD AEROBE CULT: ABNORMAL
BACTERIA BLD AEROBE CULT: ABNORMAL
BACTERIA BLD CULT: ABNORMAL
BACTERIA BLD CULT: ABNORMAL
BUN SERPL-MCNC: 26 MG/DL (ref 6–23)
CALCIUM SERPL-MCNC: 8.8 MG/DL (ref 8.6–10.3)
CHLORIDE SERPL-SCNC: 104 MMOL/L (ref 98–107)
CO2 SERPL-SCNC: 30 MMOL/L (ref 21–32)
CREAT SERPL-MCNC: 0.98 MG/DL (ref 0.5–1.05)
ERYTHROCYTE [DISTWIDTH] IN BLOOD BY AUTOMATED COUNT: 12.7 % (ref 11.5–14.5)
GFR SERPL CREATININE-BSD FRML MDRD: 65 ML/MIN/1.73M*2
GLUCOSE BLD MANUAL STRIP-MCNC: 109 MG/DL (ref 74–99)
GLUCOSE BLD MANUAL STRIP-MCNC: 169 MG/DL (ref 74–99)
GLUCOSE BLD MANUAL STRIP-MCNC: 230 MG/DL (ref 74–99)
GLUCOSE SERPL-MCNC: 273 MG/DL (ref 74–99)
GRAM STN SPEC: ABNORMAL
GRAM STN SPEC: ABNORMAL
HCT VFR BLD AUTO: 31.9 % (ref 36–46)
HGB BLD-MCNC: 10.5 G/DL (ref 12–16)
MCH RBC QN AUTO: 33.4 PG (ref 26–34)
MCHC RBC AUTO-ENTMCNC: 32.9 G/DL (ref 32–36)
MCV RBC AUTO: 102 FL (ref 80–100)
NRBC BLD-RTO: 0 /100 WBCS (ref 0–0)
PLATELET # BLD AUTO: 208 X10*3/UL (ref 150–450)
PMV BLD AUTO: 10.1 FL (ref 7.5–11.5)
POTASSIUM SERPL-SCNC: 4.1 MMOL/L (ref 3.5–5.3)
PROCALCITONIN SERPL-MCNC: 0.56 NG/ML
RBC # BLD AUTO: 3.14 X10*6/UL (ref 4–5.2)
SODIUM SERPL-SCNC: 137 MMOL/L (ref 136–145)
WBC # BLD AUTO: 4.3 X10*3/UL (ref 4.4–11.3)

## 2023-10-24 PROCEDURE — 2500000004 HC RX 250 GENERAL PHARMACY W/ HCPCS (ALT 636 FOR OP/ED): Performed by: INTERNAL MEDICINE

## 2023-10-24 PROCEDURE — 96372 THER/PROPH/DIAG INJ SC/IM: CPT

## 2023-10-24 PROCEDURE — 80048 BASIC METABOLIC PNL TOTAL CA: CPT

## 2023-10-24 PROCEDURE — 2500000001 HC RX 250 WO HCPCS SELF ADMINISTERED DRUGS (ALT 637 FOR MEDICARE OP): Performed by: STUDENT IN AN ORGANIZED HEALTH CARE EDUCATION/TRAINING PROGRAM

## 2023-10-24 PROCEDURE — 2500000002 HC RX 250 W HCPCS SELF ADMINISTERED DRUGS (ALT 637 FOR MEDICARE OP, ALT 636 FOR OP/ED): Performed by: INTERNAL MEDICINE

## 2023-10-24 PROCEDURE — 97116 GAIT TRAINING THERAPY: CPT | Mod: GP

## 2023-10-24 PROCEDURE — 85027 COMPLETE CBC AUTOMATED: CPT

## 2023-10-24 PROCEDURE — 96372 THER/PROPH/DIAG INJ SC/IM: CPT | Performed by: INTERNAL MEDICINE

## 2023-10-24 PROCEDURE — 1200000002 HC GENERAL ROOM WITH TELEMETRY DAILY

## 2023-10-24 PROCEDURE — 97112 NEUROMUSCULAR REEDUCATION: CPT | Mod: GP

## 2023-10-24 PROCEDURE — 2500000004 HC RX 250 GENERAL PHARMACY W/ HCPCS (ALT 636 FOR OP/ED): Performed by: HOSPITALIST

## 2023-10-24 PROCEDURE — 2500000004 HC RX 250 GENERAL PHARMACY W/ HCPCS (ALT 636 FOR OP/ED)

## 2023-10-24 PROCEDURE — 36415 COLL VENOUS BLD VENIPUNCTURE: CPT

## 2023-10-24 PROCEDURE — 82947 ASSAY GLUCOSE BLOOD QUANT: CPT

## 2023-10-24 RX ORDER — INSULIN GLARGINE 100 [IU]/ML
14 INJECTION, SOLUTION SUBCUTANEOUS EVERY 24 HOURS
Status: DISCONTINUED | OUTPATIENT
Start: 2023-10-24 | End: 2023-10-25 | Stop reason: HOSPADM

## 2023-10-24 RX ORDER — INSULIN LISPRO 100 [IU]/ML
4 INJECTION, SOLUTION INTRAVENOUS; SUBCUTANEOUS
Qty: 10 ML | Refills: 1 | Status: SHIPPED | OUTPATIENT
Start: 2023-10-24

## 2023-10-24 RX ORDER — INSULIN GLARGINE 100 [IU]/ML
14 INJECTION, SOLUTION SUBCUTANEOUS EVERY 24 HOURS
Qty: 10 ML | Refills: 1 | Status: SHIPPED | OUTPATIENT
Start: 2023-10-25

## 2023-10-24 RX ORDER — INSULIN LISPRO 100 [IU]/ML
4 INJECTION, SOLUTION INTRAVENOUS; SUBCUTANEOUS
Status: DISCONTINUED | OUTPATIENT
Start: 2023-10-24 | End: 2023-10-25

## 2023-10-24 RX ORDER — INSULIN LISPRO 100 [IU]/ML
0-10 INJECTION, SOLUTION INTRAVENOUS; SUBCUTANEOUS
Qty: 10 ML | Refills: 1 | Status: SHIPPED | OUTPATIENT
Start: 2023-10-24

## 2023-10-24 RX ORDER — MEROPENEM 1 G/1
1 INJECTION, POWDER, FOR SOLUTION INTRAVENOUS EVERY 8 HOURS
Status: DISCONTINUED | OUTPATIENT
Start: 2023-10-24 | End: 2023-10-25 | Stop reason: HOSPADM

## 2023-10-24 RX ADMIN — MIDODRINE HYDROCHLORIDE 15 MG: 10 TABLET ORAL at 11:59

## 2023-10-24 RX ADMIN — INSULIN LISPRO 4 UNITS: 100 INJECTION, SOLUTION INTRAVENOUS; SUBCUTANEOUS at 21:15

## 2023-10-24 RX ADMIN — INSULIN GLARGINE 14 UNITS: 100 INJECTION, SOLUTION SUBCUTANEOUS at 08:32

## 2023-10-24 RX ADMIN — MIDODRINE HYDROCHLORIDE 15 MG: 10 TABLET ORAL at 18:11

## 2023-10-24 RX ADMIN — VANCOMYCIN HYDROCHLORIDE 1250 MG: 1.25 INJECTION, POWDER, LYOPHILIZED, FOR SOLUTION INTRAVENOUS at 21:15

## 2023-10-24 RX ADMIN — PANTOPRAZOLE SODIUM 40 MG: 40 TABLET, DELAYED RELEASE ORAL at 08:34

## 2023-10-24 RX ADMIN — INSULIN LISPRO 4 UNITS: 100 INJECTION, SOLUTION INTRAVENOUS; SUBCUTANEOUS at 12:00

## 2023-10-24 RX ADMIN — POLYETHYLENE GLYCOL 3350 17 G: 17 POWDER, FOR SOLUTION ORAL at 08:35

## 2023-10-24 RX ADMIN — HEPARIN SODIUM 5000 UNITS: 5000 INJECTION INTRAVENOUS; SUBCUTANEOUS at 14:14

## 2023-10-24 RX ADMIN — INSULIN LISPRO 4 UNITS: 100 INJECTION, SOLUTION INTRAVENOUS; SUBCUTANEOUS at 08:33

## 2023-10-24 RX ADMIN — MIDODRINE HYDROCHLORIDE 15 MG: 10 TABLET ORAL at 02:35

## 2023-10-24 RX ADMIN — HEPARIN SODIUM 5000 UNITS: 5000 INJECTION INTRAVENOUS; SUBCUTANEOUS at 21:14

## 2023-10-24 RX ADMIN — INSULIN LISPRO 6 UNITS: 100 INJECTION, SOLUTION INTRAVENOUS; SUBCUTANEOUS at 08:34

## 2023-10-24 RX ADMIN — MEROPENEM 1 G: 1 INJECTION, POWDER, FOR SOLUTION INTRAVENOUS at 08:35

## 2023-10-24 RX ADMIN — ACETAMINOPHEN 650 MG: 160 SOLUTION ORAL at 21:13

## 2023-10-24 RX ADMIN — INSULIN LISPRO 2 UNITS: 100 INJECTION, SOLUTION INTRAVENOUS; SUBCUTANEOUS at 11:58

## 2023-10-24 RX ADMIN — MEROPENEM 1 G: 1 INJECTION, POWDER, FOR SOLUTION INTRAVENOUS at 18:11

## 2023-10-24 RX ADMIN — HEPARIN SODIUM 5000 UNITS: 5000 INJECTION INTRAVENOUS; SUBCUTANEOUS at 05:43

## 2023-10-24 ASSESSMENT — COGNITIVE AND FUNCTIONAL STATUS - GENERAL
MOBILITY SCORE: 24
MOVING TO AND FROM BED TO CHAIR: A LITTLE
WALKING IN HOSPITAL ROOM: A LITTLE
DAILY ACTIVITIY SCORE: 24
CLIMB 3 TO 5 STEPS WITH RAILING: A LITTLE
MOBILITY SCORE: 21

## 2023-10-24 ASSESSMENT — PAIN - FUNCTIONAL ASSESSMENT
PAIN_FUNCTIONAL_ASSESSMENT: 0-10

## 2023-10-24 ASSESSMENT — PAIN SCALES - GENERAL
PAINLEVEL_OUTOF10: 0 - NO PAIN
PAINLEVEL_OUTOF10: 3
PAINLEVEL_OUTOF10: 0 - NO PAIN

## 2023-10-24 ASSESSMENT — PAIN DESCRIPTION - DESCRIPTORS: DESCRIPTORS: HEADACHE

## 2023-10-24 NOTE — PROGRESS NOTES
10/24/2023  Asking for updated therapy notes. Last [facility] was 12.  Ct Team will follow up for needs.   Lelo Oh RN TCC

## 2023-10-24 NOTE — CARE PLAN
The patient's goals for the shift include  have good blood pressure enough to go home    The clinical goals for the shift include pt will have a BP MAP of above 60 during my shift    Over the shift, the patient did not make progress toward the following goals. Barriers to progression include laying on side during sleeping. Recommendations to address these barriers include lay on back as much as tolerated.

## 2023-10-24 NOTE — PROGRESS NOTES
Nutrition Progress Note    Not able to try and meet with patient today.  Will try again tomorrow.

## 2023-10-24 NOTE — PROGRESS NOTES
10/24/2023 Followed up with pt in room, introduced self and explained role.  Rn in room and stated pt has been ambulating in the halls and does just fine.  New to insulin and pt was giving her own shots. Stated she has a glucometer at home.  Stated she has help from friends and neighbors if needed.  Stated she is independent, performs own ADL's. Still drives.  CT Team will continue to follow for any further needs.    PCP- Dr Josette Oh RN TCC

## 2023-10-24 NOTE — PROGRESS NOTES
"    Endocrinology Inpatient Consult Progress Note     PATIENT NAME: Ivelisse Paulino  MRN: [id number]  DATE: 10/24/2023    CONSULTING PHYSICIAN: Dr. Rush  REASON FOR CONSULT: DKA. T1DM.      Interval Events     No acute events overnight.  Patient worked with diabetes education yesterday.  She feels fairly comfortable injecting insulin.  She is happy with her improvement of her blood sugars.  Denies any nausea vomiting.  Tolerating all of her meals.  Feels better and has less urgency.      Physical Examination     BP 97/57   Pulse 53   Temp 36.3 °C (97.3 °F)   Resp 17   Ht 1.7 m (5' 6.93\")   Wt 57.7 kg (127 lb 3.3 oz)   SpO2 96%   BMI 19.97 kg/m²   No acute distress.   RRR.  Nonlabored respiration.  Soft nontender nondistended abdomen.  No pedal edema bilaterally.  Appropriate affect.      Medications     Reviewed MAR       Data     Recent Labs and Imaging Reviewed    Date  PreBreakfast Pre Lunch Pre Dinner Bedtime 3AM    10/20 187 (G12, H4+4) 71 218 (H4+8) 127    10/21 228 (G12, H4+4) 65 183 (H+4) 177 (H+4)    10/22 249 (G12, H2+12?) 162 (H2+4) 147 (H2) 186 (H2?+4)    10/23 190 (G12, H2+2) 192 (H2+2) 243 (H2+4) 194 (H+2)    10/24            Assessment / Plan        # Type 1 Diabetes Mellitus  HbA1c (10/23): 15.3  Home Regimen: Metformin.  Presented in [place] DKA.     C Peptide noted to be 0.28 glucose of 204 on this admission.  Patient is not making insulin. Will treat as T1DM physiology.    -Increase glargine 14 units in the morning  -Continue lispro 4 units with each meal  -Continue lispro sliding scale with meals and at bedtime  -We will need diabetes education and starting on a multiple daily injection program.  -Follow T1DM Abs  -No metformin on discharge.     # KLEBER: Resolved.     # Hypothyroidism: Maintained on levothyroxine.  Reviewed TSH was only mildly elevated to 4.78 in the setting of acute illness.  No intervention at this time.  Continue same levothyroxine dose.     # NAFLD: Incidentally seen on " cross-sectional imaging.  She surely does not have the phenotype for the same.     #Encephalopathy: Resolved. Likely 2/2 DKA.    #Hypertension: Reviewed urine output creatinine ratio was found to be 113.8.  This is the setting of an KLEBER.  Perhaps we can recheck this later once her renal function improves.     # Dyslipidemia: We will explore more when she is more clinically stable.    # Severe Protein Calorie Malnutrion: There was question that she could have adrenal insufficiency given her likely type 1 diabetes mellitus as well as SLE.  We did obtain a morning cortisol yesterday which was found to be 36.3.  This is reassuring against adrenal insufficiency.  No indication for glucocorticoids.  Nutrition following.       Donny Garcia, DO  Endocrinology, Diabetes, and Metabolism    Available via EPIC Messenger

## 2023-10-24 NOTE — NURSING NOTE
10/24/23 1100 Patient Navigator  I saw the patient again this am and answered her questions. We reviewed the information I provided yesterday along with insulin injections & SSI calculations. I also reviewed food options and carbohydrate counting- I gave her an additional handout. She verbalized stressors that she has previously endured- She declined speaking with a , however I did update the pt RN. She denied any other needs and appreciated my follow up visit. She has my contact information and understands to contact me as needed.    Handout:  Food List- Eat Right    Faye RUST, RN  Diabetes Care &   Stroke Educator

## 2023-10-24 NOTE — PROGRESS NOTES
Medication Adjustment    The following medication(s) was/were adjusted for Ivelisse Paulino per protocol/policy due to altered renal function.    Medication(s) adjusted:   1 g q12h adjusted to 1g q8h per Estimated Creatinine Clearance: 53.5 mL/min (by C-G formula based on SCr of 0.98 mg/dL).    Please call with any questions.  Renata Gómez, PharmD

## 2023-10-24 NOTE — PROGRESS NOTES
Physical Therapy    Physical Therapy Treatment    Patient Name: Ivelisse Paulino  MRN: [id number]  Today's Date: 10/24/2023  Time Calculation  Start Time: 1035  Stop Time: 1100  Time Calculation (min): 25 min     Assessment/Plan   PT Assessment  PT Assessment Results: Decreased strength, Decreased endurance, Impaired balance, Decreased mobility  Rehab Prognosis: Good  Evaluation/Treatment Tolerance: Patient limited by pain  Medical Staff Made Aware: Yes  Strengths:  (directability)  Barriers to Participation: Comorbidities, Housing layout, Support of Caregivers, Support of extended family/friends  End of Session Communication: Bedside nurse  Assessment Comment: Much progress noted this date, as compared with initial eval performed on 10-20-23.  Pt's fxl mob/gait specifically is more fluid, increased stride, increased distance tolerance.  Overall, pt.still somewhat weak and guarded but pt. states she could rtn home and has supports available.  End of Session Patient Position: Bed, 3 rail up     PT Plan  Treatment/Interventions: Bed mobility, Transfer training, Gait training, Stair training, Balance training, Neuromuscular re-education, Strengthening, Endurance training, Therapeutic exercise, Home exercise program  PT Plan: Skilled PT  PT Frequency: 3 times per week  PT Discharge Recommendations: Moderate intensity level of continued care  PT - OK to Discharge: Yes    General Visit Information:   PT  Visit  PT Received On: 10/24/23  General  Prior to Session Communication: Bedside nurse (Gema)  Patient Position Received: Bed, 2 rail up, Alarm off, not on at start of session  General Comment: Pt. pleasant, cooperative; eager to progress with therapy.  Reports getting up to commode in her room on her own.    Subjective   Precautions:  Precautions  Medical Precautions: Fall precautions, Cardiac precautions  Vital Signs:     Objective   Pain:  Pain Assessment  Pain Assessment: 0-10  Pain Score: 0 - No pain (however, does  describe back spasms occasionally and having some issue with her right 5th digit toenail... which she says may be affecting her gait.)  Cognition:  Cognition  Overall Cognitive Status: Within Functional Limits  Postural Control:     Extremity/Trunk Assessments:     Activity Tolerance:  Activity Tolerance  Endurance: Tolerates 10 - 20 min exercise with multiple rests  Treatments:  Therapeutic Exercise  Therapeutic Exercise Performed:  (Functional squats x 10; calf raises with ricardo ue support for fingertip balance only; hip abd x 10 standing)    Balance/Neuromuscular Re-Education  Balance/Neuromuscular Re-Education Activity Performed: Yes  Balance/Neuromuscular Re-Education Activity 1: Worked on Dyn stand balance activities including alternating high marches, retrowalking, reaching outside elia, sidestepping; no ad; noted mildly guarded but able to complete without lob.    Bed Mobility 1  Bed Mobility Comments 1: supine>sit independent, hob mildly elevated    Ambulation/Gait Training 1  Comments/Distance (ft) 1: Pt. ambulated approx. 100ft. x2 without AD, erq. SBA>supervision.  Noted right leg flares out laterally... per pt.related to a toe problem she is having.  Reports she feels more confident and just needs more practice.  No lob noted.  Transfer 1  Trials/Comments 1: sit>stand from eob and chair level independent    Outcome Measures:  [facility] Basic Mobility  Turning from your back to your side while in a flat bed without using bedrails: None  Moving from lying on your back to sitting on the side of a flat bed without using bedrails: None  Moving to and from bed to chair (including a wheelchair): A little  Standing up from a chair using your arms (e.g. wheelchair or bedside chair): None  To walk in hospital room: A little  Climbing 3-5 steps with railing: A little  Basic Mobility - Total Score: 21    Education Documentation  Precautions, taught by Norma Wright, PT at 10/24/2023  2:28 PM.  Learner: Patient  Readiness:  Acceptance  Method: Demonstration  Response: Verbalizes Understanding    Mobility Training, taught by Norma Wright PT at 10/24/2023  2:28 PM.  Learner: Patient  Readiness: Acceptance  Method: Demonstration  Response: Verbalizes Understanding    Education Comments  No comments found.    OP EDUCATION:  Education  Individual(s) Educated: Patient  Education Provided: Home Exercise Program, Fall Risk (activity progression)  Patient/Caregiver Demonstrated Understanding: yes  Patient Response to Education: Patient/Caregiver Verbalized Understanding of Information    Encounter Problems       Encounter Problems (Active)       Balance       STG - Maintains dynamic standing balance without upper extremity support (Progressing)       Start:  10/20/23    Expected End:  11/10/23       And independence             Mobility       STG - Patient will ambulate (Progressing)       Start:  10/20/23    Expected End:  11/10/23       >/= 150 fee with adaptive vital sign response and LRAD         STG - Patient will ascend and descend a flight of stairs (Progressing)       Start:  10/20/23    Expected End:  11/10/23       With 1 HR and SBA            Pain - Adult          Transfers       STG - Transfer from bed to chair (Progressing)       Start:  10/20/23    Expected End:  11/10/23       With independence         STG - Patient will perform bed mobility (Progressing)       Start:  10/20/23    Expected End:  11/10/23       With independence         STG - Patient will transfer sit to and from stand (Progressing)       Start:  10/20/23    Expected End:  11/10/23       With independence

## 2023-10-25 VITALS
RESPIRATION RATE: 28 BRPM | BODY MASS INDEX: 19.24 KG/M2 | SYSTOLIC BLOOD PRESSURE: 94 MMHG | OXYGEN SATURATION: 98 % | TEMPERATURE: 97.5 F | WEIGHT: 122.58 LBS | DIASTOLIC BLOOD PRESSURE: 66 MMHG | HEART RATE: 91 BPM | HEIGHT: 67 IN

## 2023-10-25 LAB
ANION GAP SERPL CALC-SCNC: 7 MMOL/L (ref 10–20)
BUN SERPL-MCNC: 24 MG/DL (ref 6–23)
CALCIUM SERPL-MCNC: 8.7 MG/DL (ref 8.6–10.3)
CHLORIDE SERPL-SCNC: 107 MMOL/L (ref 98–107)
CO2 SERPL-SCNC: 31 MMOL/L (ref 21–32)
CREAT SERPL-MCNC: 0.9 MG/DL (ref 0.5–1.05)
ERYTHROCYTE [DISTWIDTH] IN BLOOD BY AUTOMATED COUNT: 12.7 % (ref 11.5–14.5)
GFR SERPL CREATININE-BSD FRML MDRD: 72 ML/MIN/1.73M*2
GLUCOSE BLD MANUAL STRIP-MCNC: 131 MG/DL (ref 74–99)
GLUCOSE BLD MANUAL STRIP-MCNC: 183 MG/DL (ref 74–99)
GLUCOSE BLD MANUAL STRIP-MCNC: 83 MG/DL (ref 74–99)
GLUCOSE SERPL-MCNC: 114 MG/DL (ref 74–99)
HCT VFR BLD AUTO: 31.4 % (ref 36–46)
HGB BLD-MCNC: 10.4 G/DL (ref 12–16)
MCH RBC QN AUTO: 34.4 PG (ref 26–34)
MCHC RBC AUTO-ENTMCNC: 33.1 G/DL (ref 32–36)
MCV RBC AUTO: 104 FL (ref 80–100)
NRBC BLD-RTO: 0 /100 WBCS (ref 0–0)
PLATELET # BLD AUTO: 231 X10*3/UL (ref 150–450)
PMV BLD AUTO: 10 FL (ref 7.5–11.5)
POTASSIUM SERPL-SCNC: 4.1 MMOL/L (ref 3.5–5.3)
RBC # BLD AUTO: 3.02 X10*6/UL (ref 4–5.2)
SODIUM SERPL-SCNC: 141 MMOL/L (ref 136–145)
WBC # BLD AUTO: 6.1 X10*3/UL (ref 4.4–11.3)

## 2023-10-25 PROCEDURE — 36415 COLL VENOUS BLD VENIPUNCTURE: CPT

## 2023-10-25 PROCEDURE — 96372 THER/PROPH/DIAG INJ SC/IM: CPT

## 2023-10-25 PROCEDURE — 2500000004 HC RX 250 GENERAL PHARMACY W/ HCPCS (ALT 636 FOR OP/ED)

## 2023-10-25 PROCEDURE — 2500000001 HC RX 250 WO HCPCS SELF ADMINISTERED DRUGS (ALT 637 FOR MEDICARE OP): Performed by: STUDENT IN AN ORGANIZED HEALTH CARE EDUCATION/TRAINING PROGRAM

## 2023-10-25 PROCEDURE — 2500000004 HC RX 250 GENERAL PHARMACY W/ HCPCS (ALT 636 FOR OP/ED): Performed by: INTERNAL MEDICINE

## 2023-10-25 PROCEDURE — 2500000002 HC RX 250 W HCPCS SELF ADMINISTERED DRUGS (ALT 637 FOR MEDICARE OP, ALT 636 FOR OP/ED): Performed by: INTERNAL MEDICINE

## 2023-10-25 PROCEDURE — 96372 THER/PROPH/DIAG INJ SC/IM: CPT | Performed by: INTERNAL MEDICINE

## 2023-10-25 PROCEDURE — 82947 ASSAY GLUCOSE BLOOD QUANT: CPT

## 2023-10-25 PROCEDURE — 80048 BASIC METABOLIC PNL TOTAL CA: CPT

## 2023-10-25 PROCEDURE — 85027 COMPLETE CBC AUTOMATED: CPT

## 2023-10-25 RX ORDER — INSULIN LISPRO 100 [IU]/ML
3 INJECTION, SOLUTION INTRAVENOUS; SUBCUTANEOUS
Status: DISCONTINUED | OUTPATIENT
Start: 2023-10-25 | End: 2023-10-25 | Stop reason: HOSPADM

## 2023-10-25 RX ADMIN — HEPARIN SODIUM 5000 UNITS: 5000 INJECTION INTRAVENOUS; SUBCUTANEOUS at 05:27

## 2023-10-25 RX ADMIN — POLYETHYLENE GLYCOL 3350 17 G: 17 POWDER, FOR SOLUTION ORAL at 08:36

## 2023-10-25 RX ADMIN — INSULIN GLARGINE 14 UNITS: 100 INJECTION, SOLUTION SUBCUTANEOUS at 08:36

## 2023-10-25 RX ADMIN — INSULIN LISPRO 3 UNITS: 100 INJECTION, SOLUTION INTRAVENOUS; SUBCUTANEOUS at 08:37

## 2023-10-25 RX ADMIN — MEROPENEM 1 G: 1 INJECTION, POWDER, FOR SOLUTION INTRAVENOUS at 13:19

## 2023-10-25 RX ADMIN — MIDODRINE HYDROCHLORIDE 15 MG: 10 TABLET ORAL at 13:14

## 2023-10-25 RX ADMIN — MEROPENEM 1 G: 1 INJECTION, POWDER, FOR SOLUTION INTRAVENOUS at 05:28

## 2023-10-25 RX ADMIN — INSULIN LISPRO 3 UNITS: 100 INJECTION, SOLUTION INTRAVENOUS; SUBCUTANEOUS at 17:54

## 2023-10-25 RX ADMIN — MIDODRINE HYDROCHLORIDE 15 MG: 10 TABLET ORAL at 02:39

## 2023-10-25 RX ADMIN — PANTOPRAZOLE SODIUM 40 MG: 40 TABLET, DELAYED RELEASE ORAL at 08:35

## 2023-10-25 RX ADMIN — INSULIN LISPRO 3 UNITS: 100 INJECTION, SOLUTION INTRAVENOUS; SUBCUTANEOUS at 13:15

## 2023-10-25 RX ADMIN — INSULIN LISPRO 2 UNITS: 100 INJECTION, SOLUTION INTRAVENOUS; SUBCUTANEOUS at 08:39

## 2023-10-25 ASSESSMENT — PAIN SCALES - GENERAL
PAINLEVEL_OUTOF10: 0 - NO PAIN

## 2023-10-25 ASSESSMENT — PAIN - FUNCTIONAL ASSESSMENT
PAIN_FUNCTIONAL_ASSESSMENT: 0-10

## 2023-10-25 NOTE — CARE PLAN
Problem: Fall/Injury  Goal: Be free from injury by end of the shift  Outcome: Progressing  Goal: Verbalize understanding of personal risk factors for fall in the hospital  Outcome: Progressing     Problem: Pain  Goal: My pain/discomfort is manageable  Outcome: Progressing     Problem: Safety  Goal: Patient will be injury free during hospitalization  Outcome: Progressing  Goal: I will remain free of falls  Outcome: Progressing     Problem: Daily Care  Goal: Daily care needs are met  Outcome: Progressing     Problem: Psychosocial Needs  Goal: Demonstrates ability to cope with hospitalization/illness  Outcome: Progressing  Goal: Collaborate with me, my family, and caregiver to identify my specific goals  Outcome: Progressing     Problem: Discharge Barriers  Goal: My discharge needs are met  Outcome: Progressing     Problem: Diabetes  Goal: Achieve decreasing blood glucose levels by end of shift  Outcome: Progressing  Goal: Increase stability of blood glucose readings by end of shift  Outcome: Progressing  Goal: Decrease in ketones present in urine by end of shift  Outcome: Progressing  Goal: Maintain electrolyte levels within acceptable range throughout shift  Outcome: Progressing  Goal: Maintain glucose levels >70mg/dl to <250mg/dl throughout shift  Outcome: Progressing  Goal: No changes in neurological exam by end of shift  Outcome: Progressing  Goal: Learn about and adhere to nutrition recommendations by end of shift  Outcome: Progressing  Goal: Vital signs within normal range for age by end of shift  Outcome: Progressing  Goal: Increase self care and/or family involovement by end of shift  Outcome: Progressing  Goal: Receive DSME education by end of shift  Outcome: Progressing     Problem: Pain - Adult  Goal: Verbalizes/displays adequate comfort level or baseline comfort level  Outcome: Progressing     Problem: Safety - Adult  Goal: Free from fall injury  Outcome: Progressing     Problem: Discharge  Planning  Goal: Discharge to home or other facility with appropriate resources  Outcome: Progressing     Problem: Chronic Conditions and Co-morbidities  Goal: Patient's chronic conditions and co-morbidity symptoms are monitored and maintained or improved  Outcome: Progressing     Problem: Skin  Goal: Decreased wound size/increased tissue granulation at next dressing change  Outcome: Progressing  Goal: Participates in plan/prevention/treatment measures  Outcome: Progressing  Goal: Prevent/manage excess moisture  Outcome: Progressing  Goal: Prevent/minimize sheer/friction injuries  Outcome: Progressing  Goal: Promote/optimize nutrition  Outcome: Progressing  Goal: Promote skin healing  Outcome: Progressing     The patient's goals for the shift include  remain comfortable throughout this shift    The clinical goals for the shift include maintain blood glucose levels     Over the shift, the patient did not make progress toward the following goals. Barriers to progression include diabetes. Recommendations to address these barriers include insulin.

## 2023-10-25 NOTE — PROGRESS NOTES
"    Endocrinology Inpatient Consult Progress Note     PATIENT NAME: Ivelisse Paulino  MRN: [id number]  DATE: 10/25/2023    CONSULTING PHYSICIAN: Dr. Rush  REASON FOR CONSULT: DKA. T1DM.      Interval Events     Dinner time insulin was held by RN.   Sugars are high at bedtime.  Patient states that she feels comfortable with injecting insulin.  She states that she will likely be discharged today.  Denies any nausea or vomiting.  \"I feel infinitely better\".      Physical Examination     BP 96/59   Pulse 61   Temp 36.3 °C (97.3 °F) (Temporal)   Resp 15   Ht 1.7 m (5' 6.93\")   Wt 55.6 kg (122 lb 9.2 oz)   SpO2 97%   BMI 19.24 kg/m²   No acute distress.   RRR.  Nonlabored respiration.  Soft nontender nondistended abdomen.  No pedal edema bilaterally.  Appropriate affect.      Medications     Reviewed MAR       Data     Recent Labs and Imaging Reviewed    Date  PreBreakfast Pre Lunch Pre Dinner Bedtime 3AM    10/20 187 (G12, H4+4) 71 218 (H4+8) 127    10/21 228 (G12, H4+4) 65 183 (H+4) 177 (H+4)    10/22 249 (G12, H2+12?) 162 (H2+4) 147 (H2) 186 (H2?+4)    10/23 190 (G12, H2+2) 192 (H2+2) 243 (H2+4) 194 (H+2)    10/24 273 (G14, H4+6) 169 (H4+2) 109 230 (H+4)    10/25 114           Assessment / Plan        # Type 1 Diabetes Mellitus  HbA1c (10/23): 15.3  Home Regimen: Metformin.  Presented in [place] DKA.     C Peptide noted to be 0.28 glucose of 204 on this admission.  Patient is not making insulin. Will treat as T1DM physiology.    -Continue glargine 14 units in the morning  -Decrease lispro 3 units with each meal  -Continue lispro sliding scale with meals and at bedtime  -We will need diabetes education and starting on a multiple daily injection program.  -Follow T1DM Abs  -No metformin on discharge.     # KLEBER: Resolved.     # Hypothyroidism: Maintained on levothyroxine.  Reviewed TSH was only mildly elevated to 4.78 in the setting of acute illness.  No intervention at this time.  Continue same levothyroxine dose.   "   # NAFLD: Incidentally seen on cross-sectional imaging.  She surely does not have the phenotype for the same.     #Encephalopathy: Resolved. Likely 2/2 DKA.    #Hypertension: Reviewed urine output creatinine ratio was found to be 113.8.  This is the setting of an KLEBER.  Perhaps we can recheck this later once her renal function improves.     # Dyslipidemia: We will explore more when she is more clinically stable.    # Severe Protein Calorie Malnutrion: There was question that she could have adrenal insufficiency given her likely type 1 diabetes mellitus as well as SLE.  We did obtain a morning cortisol yesterday which was found to be 36.3.  This is reassuring against adrenal insufficiency.  No indication for glucocorticoids.  Nutrition following.       Donny Garcia,   Endocrinology, Diabetes, and Metabolism    Available via [employer] Messenger

## 2023-10-25 NOTE — NURSING NOTE
10/25/23 [id number] Patient Navigator  I reviewed with the patient again how to calculate corrective insulin and demonstrated how to use the insulin pen. She was able to demonstrate back & teach back without difficulty. In addition, the dietitian was in to see the patient and reviewed food options. She denied any other questions or needs. She feels comfortable and confident with the diabetes information provided to her. I again informed her to call me with any questions or needs. I have updated the patient's RN of my visit.     Handouts:  Blood sugar log  Dining out with diabetes- Cristina Nordisk    Faye RUST, RN  Diabetes Care &   Stroke Educator

## 2023-10-25 NOTE — CARE PLAN
Problem: Fall/Injury  Goal: Be free from injury by end of the shift  Outcome: Progressing  Goal: Verbalize understanding of personal risk factors for fall in the hospital  Outcome: Progressing     Problem: Pain  Goal: My pain/discomfort is manageable  Outcome: Progressing     Problem: Safety  Goal: Patient will be injury free during hospitalization  Outcome: Progressing  Goal: I will remain free of falls  Outcome: Progressing     Problem: Daily Care  Goal: Daily care needs are met  Outcome: Progressing     Problem: Psychosocial Needs  Goal: Demonstrates ability to cope with hospitalization/illness  Outcome: Progressing  Goal: Collaborate with me, my family, and caregiver to identify my specific goals  Outcome: Progressing     Problem: Discharge Barriers  Goal: My discharge needs are met  Outcome: Progressing     Problem: Diabetes  Goal: Achieve decreasing blood glucose levels by end of shift  Outcome: Progressing  Goal: Increase stability of blood glucose readings by end of shift  Outcome: Progressing  Goal: Decrease in ketones present in urine by end of shift  Outcome: Progressing  Goal: Maintain electrolyte levels within acceptable range throughout shift  Outcome: Progressing  Goal: Maintain glucose levels >70mg/dl to <250mg/dl throughout shift  Outcome: Progressing  Goal: No changes in neurological exam by end of shift  Outcome: Progressing  Goal: Learn about and adhere to nutrition recommendations by end of shift  Outcome: Progressing  Goal: Vital signs within normal range for age by end of shift  Outcome: Progressing  Goal: Increase self care and/or family involovement by end of shift  Outcome: Progressing     Problem: Pain - Adult  Goal: Verbalizes/displays adequate comfort level or baseline comfort level  Outcome: Progressing     Problem: Safety - Adult  Goal: Free from fall injury  Outcome: Progressing     Problem: Discharge Planning  Goal: Discharge to home or other facility with appropriate  resources  Outcome: Progressing     Problem: Chronic Conditions and Co-morbidities  Goal: Patient's chronic conditions and co-morbidity symptoms are monitored and maintained or improved  Outcome: Progressing     Problem: Skin  Goal: Decreased wound size/increased tissue granulation at next dressing change  Outcome: Progressing  Goal: Participates in plan/prevention/treatment measures  Outcome: Progressing  Goal: Prevent/manage excess moisture  Outcome: Progressing  Goal: Prevent/minimize sheer/friction injuries  Outcome: Progressing  Goal: Promote/optimize nutrition  Outcome: Progressing  Goal: Promote skin healing  Outcome: Progressing     The patient's goals for the shift include      The clinical goals for the shift include maintain blood glucose levels     Over the shift, the patient did not make progress toward the following goals. Barriers to progression include ***. Recommendations to address these barriers include ***.

## 2023-10-26 LAB
BACTERIA BLD CULT: NORMAL
BACTERIA BLD CULT: NORMAL
GAD65 AB SER IA-ACNC: >250 IU/ML (ref 0–5)
ZNT8 AB SERPL IA-ACNC: 82.2 U/ML (ref 0–15)

## 2023-10-27 ENCOUNTER — DOCUMENTATION (OUTPATIENT)
Dept: INTERNAL MEDICINE | Facility: HOSPITAL | Age: 63
End: 2023-10-27
Payer: COMMERCIAL

## 2023-10-27 NOTE — NURSING NOTE
10/27/23 0845 Patient Navigator  I received a message from the patient last night at 2147 on my voicemail and I returned her call this am. She had questions regarding insulin injection which I reviewed with her. She was able to verbalize proper teach back to me without difficulty. She states she had a malfunction with her new glucometer- spoke with the company and they are sending her a new glucometer. In the meantime, she has another glucometer which she could use. I did offer to give her a new one if she was able to get to my office. She states at this time she will use her other glucometer. I instructed the patient to call me as needed or if she wanted the glucometer with its supplies. She denied any other needs or questions & she appreciated my assistance. She verbalized understanding of this note.  Faye RUST, RN  Diabetes Care &   Stroke Educator

## 2023-11-03 LAB — ISLET CELL512 AB SER IA-ACNC: 48.6 U/ML (ref 0–7.4)

## 2023-11-06 PROBLEM — F41.0 PANIC ATTACKS: Status: ACTIVE | Noted: 2023-11-06

## 2023-11-06 PROBLEM — M50.90 CERVICAL DISC DISEASE: Status: ACTIVE | Noted: 2023-09-06

## 2023-11-06 PROBLEM — E11.9 TYPE 2 DIABETES MELLITUS (MULTI): Status: ACTIVE | Noted: 2023-11-06

## 2023-11-06 PROBLEM — E03.9 HYPOTHYROIDISM: Status: ACTIVE | Noted: 2019-01-30

## 2023-11-06 PROBLEM — K22.81 ESOPHAGEAL POLYP: Status: ACTIVE | Noted: 2023-11-06

## 2023-11-06 PROBLEM — R09.81 NASAL CONGESTION: Status: ACTIVE | Noted: 2023-01-20

## 2023-11-06 PROBLEM — R91.1 PULMONARY NODULE: Status: ACTIVE | Noted: 2023-11-06

## 2023-11-06 PROBLEM — Z86.39 HISTORY OF HYPOTHYROIDISM: Status: ACTIVE | Noted: 2023-11-06

## 2023-11-06 PROBLEM — R32 BLADDER INCONTINENCE: Status: ACTIVE | Noted: 2023-11-06

## 2023-11-06 PROBLEM — I10 HYPERTENSION: Status: ACTIVE | Noted: 2023-11-06

## 2023-11-06 PROBLEM — R63.1 POLYDIPSIA: Status: ACTIVE | Noted: 2023-11-06

## 2023-11-06 PROBLEM — H04.123 DRY EYE SYNDROME OF BOTH EYES: Status: ACTIVE | Noted: 2023-11-06

## 2023-11-06 PROBLEM — G56.03 BILATERAL CARPAL TUNNEL SYNDROME: Status: ACTIVE | Noted: 2023-09-06

## 2023-11-06 PROBLEM — R94.5 ABNORMAL LIVER FUNCTION: Status: ACTIVE | Noted: 2023-11-06

## 2023-11-06 PROBLEM — R35.0 FREQUENCY OF MICTURITION: Status: ACTIVE | Noted: 2023-11-06

## 2023-11-06 PROBLEM — H02.889 MEIBOMIAN GLAND DYSFUNCTION (MGD): Status: ACTIVE | Noted: 2023-11-06

## 2023-11-06 PROBLEM — H52.203 ASTIGMATISM OF BOTH EYES: Status: ACTIVE | Noted: 2023-11-06

## 2023-11-06 PROBLEM — E11.9 TYPE 2 DIABETES MELLITUS WITHOUT RETINOPATHY (MULTI): Status: ACTIVE | Noted: 2023-09-06

## 2023-11-06 PROBLEM — E11.65 TYPE 2 DIABETES MELLITUS WITH HYPERGLYCEMIA (MULTI): Status: ACTIVE | Noted: 2023-09-06

## 2023-11-06 PROBLEM — H53.8 BLURRED VISION: Status: ACTIVE | Noted: 2023-11-06

## 2023-11-06 PROBLEM — R41.3 MEMORY PROBLEM: Status: ACTIVE | Noted: 2023-11-06

## 2023-11-06 PROBLEM — K22.2 ESOPHAGEAL STENOSIS: Status: ACTIVE | Noted: 2023-09-06

## 2023-11-06 PROBLEM — M41.9 SCOLIOSIS: Status: ACTIVE | Noted: 2023-09-06

## 2023-11-06 RX ORDER — LANCETS
EACH MISCELLANEOUS
COMMUNITY
Start: 2021-11-16

## 2023-11-06 RX ORDER — PEN NEEDLE, DIABETIC 31 GX5/16"
NEEDLE, DISPOSABLE MISCELLANEOUS 4 TIMES DAILY
COMMUNITY
Start: 2023-10-25

## 2023-11-06 RX ORDER — LANCETS 33 GAUGE
EACH MISCELLANEOUS 4 TIMES DAILY
COMMUNITY
Start: 2023-10-25

## 2023-11-06 RX ORDER — BLOOD SUGAR DIAGNOSTIC
STRIP MISCELLANEOUS 4 TIMES DAILY
COMMUNITY

## 2023-11-06 RX ORDER — LISINOPRIL 10 MG/1
10 TABLET ORAL DAILY
COMMUNITY
Start: 2019-01-07 | End: 2024-02-14 | Stop reason: ALTCHOICE

## 2023-11-06 RX ORDER — LEVOTHYROXINE SODIUM 112 UG/1
100 TABLET ORAL DAILY
COMMUNITY
Start: 2019-10-06 | End: 2024-02-14 | Stop reason: ALTCHOICE

## 2023-11-06 RX ORDER — LISINOPRIL 5 MG/1
5 TABLET ORAL DAILY
COMMUNITY

## 2023-11-06 RX ORDER — ATORVASTATIN CALCIUM 40 MG/1
20 TABLET, FILM COATED ORAL DAILY
COMMUNITY
Start: 2019-01-07

## 2023-11-06 RX ORDER — METFORMIN HYDROCHLORIDE 500 MG/1
1000 TABLET ORAL 2 TIMES DAILY
COMMUNITY
Start: 2019-01-07 | End: 2024-02-14 | Stop reason: ALTCHOICE

## 2023-11-06 RX ORDER — LEVOTHYROXINE SODIUM 125 UG/1
1 TABLET ORAL
COMMUNITY
Start: 2019-01-30 | End: 2024-02-14 | Stop reason: ALTCHOICE

## 2023-11-06 RX ORDER — INSULIN LISPRO 100 [IU]/ML
4 INJECTION, SOLUTION INTRAVENOUS; SUBCUTANEOUS
COMMUNITY
Start: 2023-10-25 | End: 2024-02-14 | Stop reason: ALTCHOICE

## 2023-11-06 RX ORDER — OMEPRAZOLE 20 MG/1
20 CAPSULE, DELAYED RELEASE ORAL DAILY
COMMUNITY
Start: 2023-09-06

## 2023-11-06 RX ORDER — LEVOTHYROXINE SODIUM 100 UG/1
TABLET ORAL SEE ADMIN INSTRUCTIONS
COMMUNITY
Start: 2014-12-01

## 2023-11-06 RX ORDER — AMMONIUM LACTATE 12 G/100G
1 CREAM TOPICAL 2 TIMES DAILY
COMMUNITY
Start: 2016-02-29 | End: 2024-02-14 | Stop reason: ALTCHOICE

## 2023-11-06 RX ORDER — METFORMIN HYDROCHLORIDE 500 MG/1
500 TABLET ORAL
COMMUNITY
End: 2024-02-14 | Stop reason: ALTCHOICE

## 2023-11-06 RX ORDER — HYDROXYCHLOROQUINE SULFATE 200 MG/1
1 TABLET, FILM COATED ORAL DAILY
COMMUNITY
Start: 2014-05-13 | End: 2024-02-14 | Stop reason: ALTCHOICE

## 2023-11-06 RX ORDER — ALBUTEROL SULFATE 90 UG/1
2 AEROSOL, METERED RESPIRATORY (INHALATION) EVERY 4 HOURS PRN
COMMUNITY
Start: 2022-03-08

## 2023-11-06 RX ORDER — INSULIN GLARGINE 100 [IU]/ML
10 INJECTION, SOLUTION SUBCUTANEOUS SEE ADMIN INSTRUCTIONS
COMMUNITY
Start: 2023-10-25

## 2023-11-07 ENCOUNTER — OFFICE VISIT (OUTPATIENT)
Dept: PODIATRY | Facility: CLINIC | Age: 63
End: 2023-11-07
Payer: COMMERCIAL

## 2023-11-07 DIAGNOSIS — L84 SOFT CORN: ICD-10-CM

## 2023-11-07 DIAGNOSIS — M20.11 HALLUX VALGUS, RIGHT: ICD-10-CM

## 2023-11-07 DIAGNOSIS — R26.89 GAIT, ANTALGIC: ICD-10-CM

## 2023-11-07 DIAGNOSIS — S91.209A TRAUMATIC AVULSION OF NAIL PLATE OF TOE, INITIAL ENCOUNTER: ICD-10-CM

## 2023-11-07 DIAGNOSIS — M79.672 PAIN IN BOTH FEET: Primary | ICD-10-CM

## 2023-11-07 DIAGNOSIS — M79.671 PAIN IN BOTH FEET: Primary | ICD-10-CM

## 2023-11-07 PROCEDURE — 3060F POS MICROALBUMINURIA REV: CPT | Performed by: PODIATRIST

## 2023-11-07 PROCEDURE — 4010F ACE/ARB THERAPY RXD/TAKEN: CPT | Performed by: PODIATRIST

## 2023-11-07 PROCEDURE — 3046F HEMOGLOBIN A1C LEVEL >9.0%: CPT | Performed by: PODIATRIST

## 2023-11-07 PROCEDURE — 99204 OFFICE O/P NEW MOD 45 MIN: CPT | Performed by: PODIATRIST

## 2023-11-07 ASSESSMENT — ENCOUNTER SYMPTOMS
HEMATOLOGIC/LYMPHATIC NEGATIVE: 1
PSYCHIATRIC NEGATIVE: 1
CONSTITUTIONAL NEGATIVE: 1
NEUROLOGICAL NEGATIVE: 1
ENDOCRINE COMMENTS: DM

## 2023-11-07 NOTE — PROGRESS NOTES
Chief Complaint   Patient presents with    DM Foot Care     ELI    New Patient is here today for diabetic foot care.  Referred by insurance.  Has swelling in feet after being in ICU  for 6 days.    Recently discharged from hospital b/c hyperglycemia.   DM a few years.   Now on insulin.  Chief complaint bilateral foot pain this includes plantar aspect left foot has a painful hyperkeratotic area.  Also fifth digit right foot very painful sore between the toes.  She had a loose toenail she partially  in the fifth toe.  Also painful corn between the 2 digits.  She is really done nothing else for her feet.  Also hallux valgus deformity which is usually asymptomatic for her.  She has no other complaints at this time.  Remote history of smoking.    Review of Systems   Constitutional: Negative.    Eyes:         DM visual problems   Cardiovascular:         Murmur   Endocrine:        DM   Genitourinary: Negative.    Musculoskeletal:         Toe deformity.   Skin:         Corns and calluses.    Neurological: Negative.    Hematological: Negative.    Psychiatric/Behavioral: Negative.         General/Constitutional: Alert. NAD.   Respiratory: Non labored breathing.   Psychiatric: Mood and affect normal/baseline.   HEENT: Sclera clear. Wearing corrective lenses.  Dermatologic: Nails are minimally dystrophic and stable at this time.  Fifth digit nail plate self-avulsion appreciated.  Nailbed is dry.  Painful soft corn between the fourth and fifth digits on the fifth digit right foot.  Noninfected noninflamed.  Painful to palpation. No acute inflammatory infectious process.  Additionally mild hyperkeratotic area plantar aspect left foot.  Noninfected noninflamed.  Mild pain on palpation of the area.  Web spaces are dry. No ulcers no pre-ulcerative areas other than mentioned above.  Vascular: Pedal pulses are intact and symmetric including the dorsalis pedis and the posterior tibial pulses. Feet are warm to touch.  Minimal  nonpitting edema noted.    Neurological: Alert and oriented. No acute distress.  [place] Wilfredo monofilament testing normal.  Vibratory sensation mildly decreased.  Musculoskeletal: Strength is normal for age. No acute deficits appreciated.  Hallux valgus deformity left greater than right foot.  Symptomatic at this time.    Impression: Diabetes with mild neuropathy.  Self nail avulsion fifth digit right foot.  Painful hyperkeratotic area both feet.    -Today's treatment and course of therapy was discussed with the patient in detail. Patient's questions were answered. Proper foot care was discussed. This dictation was done using Dragon computer software and as such may contain grammatical errors.    -Reviewed diabetic foot care.  Avoid walking barefoot indoors.  Check your feet daily.  Do not use any corn or callus remover's on your feet.  Protective bandaging on the fifth digit right foot encouraged.    -We will get x-rays of the feet we will check on follow-up.

## 2023-11-09 NOTE — DOCUMENTATION CLARIFICATION NOTE
"    PATIENT:               RENEE PLUMMER  ACCT #:                  [id number]  MRN:                       [id number]  :                       1960  ADMIT DATE:       10/19/2023 2:47 PM  DISCH DATE:        10/25/2023 6:30 PM  RESPONDING PROVIDER #:        [id number]          PROVIDER RESPONSE TEXT:    Unable to determine POA status of Septic Shock    CDI QUERY TEXT:    [facility]_POA Status    Instruction:    Based on your assessment of the patient and the clinical information, please provide the requested documentation by clicking on the appropriate radio button and enter any additional information if prompted.    Question: Please further clarify the Present on Admission status of Septic Shock as    When answering this query, please exercise your independent professional judgment. The fact that a question is being asked, does not imply that any particular answer is desired or expected.    The patient's clinical indicators include:  Clinical Information:  62 yo admitted with DKA,, bacteremia, Septic Shock    Clinical Indicators: H and P  \"Hypotension 2/2 hypovolemia vs sepsis , q sofa 2 points high risk for in hospital mortality\"    10/19/23 BC x 2 + gram positive cocci    10/19/23  WBCs = 15.3  HR =    BPs = 79/47, 82/49  10/20/23  WBCs = 13.2  HR =    BPs = 67/41, 83/47   RR = 21-25    10/20/23 Dr. Rush  \"undifferentiated shock on vasporessors\"  10/22/23 Dr. Zamora  \"Septic shock secondary to gram positive bacteremia\", \"Acute kidney injury secondary to Sepsis/prerenal\"    Treatment:  ICU cardiorespiratory and neurologic monitoring, IV Meropenem, IV Azactam, IV Vancomycin, lab monitoring, IV LR bolus 2000 mls, Levophed gtt    Risk Factors:  62 yo with DM, DKA, bacteremia, KLEBER, metabolic encephalopathy  Options provided:  -- Septic Shock was Present on Admission  -- Septic Shock was not Present on Admission  -- Unable to determine POA status of Septic Shock  -- Other - I will add my own diagnosis  -- Refer " to Clinical Documentation Reviewer    Query created by: Faye Mckeon on 11/1/2023 3:09 PM      Electronically signed by:  JIMMY ROTH MD 11/8/2023 9:13 PM

## 2023-11-16 ENCOUNTER — ANCILLARY PROCEDURE (OUTPATIENT)
Dept: RADIOLOGY | Facility: CLINIC | Age: 63
End: 2023-11-16
Payer: COMMERCIAL

## 2023-11-16 DIAGNOSIS — M79.672 PAIN IN BOTH FEET: ICD-10-CM

## 2023-11-16 DIAGNOSIS — M79.671 PAIN IN BOTH FEET: ICD-10-CM

## 2023-11-16 PROCEDURE — 73630 X-RAY EXAM OF FOOT: CPT | Mod: BILATERAL PROCEDURE | Performed by: RADIOLOGY

## 2023-11-16 PROCEDURE — 73630 X-RAY EXAM OF FOOT: CPT | Mod: 50

## 2023-11-29 NOTE — DISCHARGE SUMMARY
Discharge Diagnosis  #1 DKA    2.  Diabetes mellitus type 1  Issues Requiring Follow-Up  Follow-up with your own PCP and endocrinology as outpatient    Discharge Meds     Your medication list        START taking these medications        Instructions Last Dose Given Next Dose Due   insulin glargine 100 unit/mL injection  Commonly known as: Lantus      Inject 14 Units under the skin once every 24 hours. Take as directed per insulin instructions. Do not start before October 25, 2023.       insulin lispro 100 unit/mL injection  Commonly known as: HumaLOG      Inject 0.04 mL (4 Units) under the skin 3 times a day with meals. Take as directed per insulin instructions.       insulin lispro 100 unit/mL injection  Commonly known as: HumaLOG      Inject 0-0.1 mL (0-10 Units) under the skin in the morning, at noon, in the evening, and at bedtime. Take as directed per insulin instructions.                 Where to Get Your Medications        These medications were sent to [facility] - [facility] - [facility] AT Portal to Registered [facility] Sites  [facility] [place] [address]      Phone: [phone]   insulin glargine 100 unit/mL injection  insulin lispro 100 unit/mL injection  insulin lispro 100 unit/mL injection         Test Results Pending At Discharge  Pending Labs       Order Current Status    Extra Tubes Preliminary result    Light Blue Top Preliminary result    SST TOP Preliminary result            Hospital Course   Ivelisse Paulino is a 63 y.o. female with a past medical history of type II DM, HTN, hypothyroid, multiple pulmonary nodules, memory problems, and SLE, presents to hospital with sudden onset abdominal pain, chest discomfort and confusion.  Patient was brought in by her friend after calling 911.  Patient usually lives alone at home.  Much of history is was obtained by friend at bedside.  She has no designated next of kin or POA.  Her friend stated that  she does not usually take her insulin regularly.  She takes metformin however may not take it regularly.  Patient does not recall the last time she took.  She denies any vomiting, shortness of breath, headache, blurry vision, diarrhea, constipation, or palpitations.  There is no record of any medications that the patient takes at home.  Patient was admitted with a diagnosis of DKA to the intensive care unit.  Her blood sugar at time of admission was 964 and patient had pH of 6.9 bicarbonate of 3 and PCO2 of 19.  Patient was aggressively treated with IV fluid, IV insulin.  Patient was seen by endocrinology service in the unit.  Her blood sugars were eventually controlled and patient was able to be taken off the insulin drip and started on a diet.  The insulin regimen was determined by the endocrinology service and patient was able to be discharged home in a stable condition on 10/25/2023 to follow-up with her own PCP and endocrine as outpatient.       Pertinent Physical Exam At Time of Discharge  Physical Exam  Patient is awake and alert oriented x3 in no distress    Thin female    Head and neck within normal limits    Lungs bilateral clear auscultation    Heart regular S1-S2    Abdomen soft and nontender    Extremities no edema  Outpatient Follow-Up  No future appointments.      Rory Dunn MD

## 2023-12-08 ENCOUNTER — TELEPHONE (OUTPATIENT)
Dept: PODIATRY | Facility: CLINIC | Age: 63
End: 2023-12-08

## 2023-12-08 ENCOUNTER — ANCILLARY PROCEDURE (OUTPATIENT)
Dept: RADIOLOGY | Facility: CLINIC | Age: 63
End: 2023-12-08
Payer: COMMERCIAL

## 2023-12-08 DIAGNOSIS — Z12.31 ENCOUNTER FOR SCREENING MAMMOGRAM FOR MALIGNANT NEOPLASM OF BREAST: ICD-10-CM

## 2023-12-08 PROCEDURE — 77063 BREAST TOMOSYNTHESIS BI: CPT

## 2023-12-08 PROCEDURE — 77067 SCR MAMMO BI INCL CAD: CPT | Performed by: RADIOLOGY

## 2023-12-08 PROCEDURE — 77063 BREAST TOMOSYNTHESIS BI: CPT | Performed by: RADIOLOGY

## 2023-12-08 NOTE — TELEPHONE ENCOUNTER
"Patient stopped in today to see when she would hear about her x-ray results and treatment plan.  She was expecting to hear something so she could \"get moving on treatment as the foot is still very painful.\"  Pt would like to get set up with regular DM foot care as well but wants to know \"how you are going to help her.\"    I let the patient know I would call her as soon as I had a plan for her.  "

## 2023-12-13 ENCOUNTER — APPOINTMENT (OUTPATIENT)
Dept: PRIMARY CARE | Facility: CLINIC | Age: 63
End: 2023-12-13
Payer: COMMERCIAL

## 2023-12-13 LAB
HOLD SPECIMEN: NORMAL
HOLD SPECIMEN: NORMAL

## 2023-12-19 LAB
ATRIAL RATE: 107 BPM
P AXIS: 76 DEGREES
PR INTERVAL: 139 MS
Q ONSET: 252 MS
QRS COUNT: 17 BEATS
QRS DURATION: 94 MS
QT INTERVAL: 373 MS
QTC CALCULATION(BAZETT): 498 MS
QTC FREDERICIA: 452 MS
R AXIS: 55 DEGREES
T AXIS: 28 DEGREES
T OFFSET: 439 MS
VENTRICULAR RATE: 107 BPM

## 2023-12-27 ENCOUNTER — TELEPHONE (OUTPATIENT)
Dept: DERMATOLOGY | Facility: CLINIC | Age: 63
End: 2023-12-27
Payer: COMMERCIAL

## 2023-12-27 NOTE — TELEPHONE ENCOUNTER
Received fax from pt regarding upcoming appt. Pt requested office call her once fax was received. I spoke with pt to inform her we did in fact receive fax.

## 2024-01-08 ENCOUNTER — OFFICE VISIT (OUTPATIENT)
Dept: SURGERY | Facility: CLINIC | Age: 64
End: 2024-01-08
Payer: COMMERCIAL

## 2024-01-08 VITALS
RESPIRATION RATE: 18 BRPM | HEART RATE: 97 BPM | BODY MASS INDEX: 22.82 KG/M2 | SYSTOLIC BLOOD PRESSURE: 101 MMHG | HEIGHT: 62 IN | DIASTOLIC BLOOD PRESSURE: 67 MMHG | OXYGEN SATURATION: 97 % | WEIGHT: 124 LBS | TEMPERATURE: 98.1 F

## 2024-01-08 DIAGNOSIS — E11.9 TYPE 2 DIABETES MELLITUS WITHOUT COMPLICATION, WITH LONG-TERM CURRENT USE OF INSULIN (MULTI): ICD-10-CM

## 2024-01-08 DIAGNOSIS — R92.8 ABNORMAL FINDINGS ON DIAGNOSTIC IMAGING OF BREAST: Primary | ICD-10-CM

## 2024-01-08 DIAGNOSIS — Z79.4 TYPE 2 DIABETES MELLITUS WITHOUT COMPLICATION, WITH LONG-TERM CURRENT USE OF INSULIN (MULTI): ICD-10-CM

## 2024-01-08 DIAGNOSIS — M32.8 OTHER FORMS OF SYSTEMIC LUPUS ERYTHEMATOSUS, UNSPECIFIED ORGAN INVOLVEMENT STATUS (MULTI): ICD-10-CM

## 2024-01-08 DIAGNOSIS — L85.3 XEROSIS OF SKIN: ICD-10-CM

## 2024-01-08 PROCEDURE — 99213 OFFICE O/P EST LOW 20 MIN: CPT | Performed by: SURGERY

## 2024-01-08 PROCEDURE — 99203 OFFICE O/P NEW LOW 30 MIN: CPT | Performed by: SURGERY

## 2024-01-08 PROCEDURE — 3078F DIAST BP <80 MM HG: CPT | Performed by: SURGERY

## 2024-01-08 PROCEDURE — 3074F SYST BP LT 130 MM HG: CPT | Performed by: SURGERY

## 2024-01-08 PROCEDURE — 4010F ACE/ARB THERAPY RXD/TAKEN: CPT | Performed by: SURGERY

## 2024-01-08 PROCEDURE — 1036F TOBACCO NON-USER: CPT | Performed by: SURGERY

## 2024-01-08 SDOH — ECONOMIC STABILITY: FOOD INSECURITY: WITHIN THE PAST 12 MONTHS, THE FOOD YOU BOUGHT JUST DIDN'T LAST AND YOU DIDN'T HAVE MONEY TO GET MORE.: NEVER TRUE

## 2024-01-08 SDOH — ECONOMIC STABILITY: FOOD INSECURITY: WITHIN THE PAST 12 MONTHS, YOU WORRIED THAT YOUR FOOD WOULD RUN OUT BEFORE YOU GOT MONEY TO BUY MORE.: NEVER TRUE

## 2024-01-08 ASSESSMENT — LIFESTYLE VARIABLES
AUDIT-C TOTAL SCORE: 0
SKIP TO QUESTIONS 9-10: 1
HOW OFTEN DO YOU HAVE SIX OR MORE DRINKS ON ONE OCCASION: NEVER
HOW MANY STANDARD DRINKS CONTAINING ALCOHOL DO YOU HAVE ON A TYPICAL DAY: PATIENT DOES NOT DRINK
HOW OFTEN DO YOU HAVE A DRINK CONTAINING ALCOHOL: NEVER

## 2024-01-08 ASSESSMENT — PATIENT HEALTH QUESTIONNAIRE - PHQ9
1. LITTLE INTEREST OR PLEASURE IN DOING THINGS: NOT AT ALL
2. FEELING DOWN, DEPRESSED OR HOPELESS: NOT AT ALL
SUM OF ALL RESPONSES TO PHQ9 QUESTIONS 1 & 2: 0

## 2024-01-08 ASSESSMENT — PAIN SCALES - GENERAL: PAINLEVEL: 0-NO PAIN

## 2024-01-08 NOTE — PROGRESS NOTES
History Of Present Illness  HPI poorly controlled type II diabetic on.now  Underwent screening mammograms  which demonstrated markedly increased in the density of her breast bilaterally raising the question of inflammatory breast cancer bilaterally.  She denies any mass dimpling discharge or any skin changes.  However the patient was hospitalized October for difficulty controlling her diabetes suspected to be a reaction to metformin.  The patient's usual body weight is usually around 125 to 130 pounds.  Her weight fell 205 pounds in October.  She has gradually increased her weight and her weight today was reportedly approximately 125 pounds.  She has no history of congestive heart failure but she reportedly had evidence of valvular dysfunction during the course of this hospitalization patient is  0 para 0.  Age of first menstrual 14.  Lites every day smoker.  No family history of breast cancer possible history of brain cancer in her father of whom she is lost contact.  Denies drug and alcohol abuse.  Prior right breast biopsy for benign findings.  Scleroderma as well     Past Medical History  She has a past medical history of Encounter for screening for malignant neoplasm of colon (2018), Essential (primary) hypertension (2019), Personal history of other diseases of the digestive system, Personal history of other diseases of the nervous system and sense organs, Personal history of other diseases of the respiratory system, Personal history of other diseases of urinary system, Personal history of other endocrine, nutritional and metabolic disease, Personal history of other endocrine, nutritional and metabolic disease (2019), Personal history of other endocrine, nutritional and metabolic disease (2019), Personal history of other endocrine, nutritional and metabolic disease (2019), Personal history of other endocrine, nutritional and metabolic disease (04/15/2019), Personal  history of other endocrine, nutritional and metabolic disease (01/09/2019), Personal history of other endocrine, nutritional and metabolic disease (03/13/2018), Personal history of other medical treatment (03/01/2016), and Systemic lupus erythematosus, unspecified (CMS/HCC).    Surgical History  She has a past surgical history that includes Carpal tunnel release (09/17/2018); Hand tendon surgery (09/17/2018); Other surgical history (09/17/2018); Breast biopsy (Right); and Breast cyst aspiration (Right).     Social History  She reports that she has quit smoking. Her smoking use included cigarettes. She has never used smokeless tobacco. She reports that she does not currently use alcohol. She reports that she does not use drugs.    Family History  Family History   Problem Relation Name Age of Onset    COPD Mother      Other (CORONARY ARTERIOSCLEROSIS) Father          Allergies  Penicillins and Esomeprazole    Review of Systems 10 review of systems negative     Visit Vitals  /67   Pulse 97   Temp 36.7 °C (98.1 °F)   Resp 18        Physical Exam GENERAL  MENTAL STATUS - Alert. GENERAL APPEARANCE - Cooperative and well groomed. ORIENTATION - Oriented X4. BUILD & NUTRITION - Well nourished and well developed. HYDRATION - Well hydrated.    INTEGUMENTARY  GENERAL CHARACTERISTICS - Overall examination of the patient´s skin reveals no rashes. COLOR - not icteric. SKIN MOISTURE - normal skin moisture. TEMPERATURE - normal worth is noted.    HEAD AND NECK  HEAD  HEAD SHAPE - Atraumatic and normocephalic.  TRACHEA - midline.  THYROID  GLAND CHARACTERISTICS - normal size and consistency and no palpable nodules.    EYE  SCLERA/CONJUNCTIVA - BILATERAL - Anicteric.    CHEST AND LUNG EXAM  AUSCULTATION -  BREATH SOUNDS - Clear.    BREAST  NIPPLES: CHARACTERISTICS - LEFT - No rash. Not inverted. RIGHT - No rash. Not Inverted. DISCHARGE - LEFT - None. DISCHARGE - RIGHT - None.  BREAST - LEFT - Non tender. No mass, skin changes,  "biopsy scars, dimpling or dimpling or Peau d´Orange. RIGHT - Non tender. No mass, skin changes, biopsy scars, dimpling or dimpling or Peau d´Orange. BILATERAL - Symmetric.    CARDIOVASCULAR  AUSCULTATION: RHYTHM - Regular. HEART SOUNDS - Normal heart sounds.  MURMURS & OTHER HEART SOUNDS - Auscultation of the heart reveals regular rate and no murmurs.    ABDOMEN  PALPATION/PERCUSSION - Palpation and percussion of the abdomen reveal soft, non tender, no mass and no hepatosplenomegaly.    LYMPHATIC  GENERAL LYMPHATICS  BREAST LYMPHATIC EXAM - No cervical adenopathy, supraclavicular adenopathy or axilliary adenopathy.         Last Recorded Vitals  Blood pressure 101/67, pulse 97, temperature 36.7 °C (98.1 °F), resp. rate 18, height 1.575 m (5' 2\"), weight 56.2 kg (124 lb), SpO2 97 %.    Relevant Results      No results found.      @[email or web address]@    Assessment/Plan       I reviewed the diagnostic imaging GENERAL  MENTAL STATUS - Alert. GENERAL APPEARANCE - Cooperative and well groomed. ORIENTATION - Oriented X4. BUILD & NUTRITION - Well nourished and well developed. HYDRATION - Well hydrated.    INTEGUMENTARY  GENERAL CHARACTERISTICS - Overall examination of the patient´s skin reveals no rashes. COLOR - not icteric. SKIN MOISTURE - normal skin moisture. TEMPERATURE - normal worth is noted.    HEAD AND NECK  HEAD  HEAD SHAPE - Atraumatic and normocephalic.  TRACHEA - midline.  THYROID  GLAND CHARACTERISTICS - normal size and consistency and no palpable nodules.    EYE  SCLERA/CONJUNCTIVA - BILATERAL - Anicteric.    CHEST AND LUNG EXAM  AUSCULTATION -  BREATH SOUNDS - Clear.    BREAST  NIPPLES: CHARACTERISTICS - LEFT - No rash. Not inverted. RIGHT - No rash. Not Inverted. DISCHARGE - LEFT - None. DISCHARGE - RIGHT - None.  BREAST - LEFT - Non tender. No mass, skin changes, biopsy scars, dimpling or dimpling or Peau d´Orange. RIGHT - Non tender. No mass, skin changes, biopsy scars, dimpling or dimpling or Peau d´Orange. " BILATERAL - Symmetric.    CARDIOVASCULAR  AUSCULTATION: RHYTHM - Regular. HEART SOUNDS - Normal heart sounds.  MURMURS & OTHER HEART SOUNDS - Auscultation of the heart reveals regular rate and no murmurs.    ABDOMEN  PALPATION/PERCUSSION - Palpation and percussion of the abdomen reveal soft, non tender, no mass and no hepatosplenomegaly.    LYMPHATIC  GENERAL LYMPHATICS  BREAST LYMPHATIC EXAM - No cervical adenopathy, supraclavicular adenopathy or axilliary adenopathy.   Appears smaller when compared to previous exam.  There is no clinical correlation to the findings.  There is no skin thickening on the mammograms as well.  It certainly may be due to significant weight loss but due to concern for clinical right breast cancer will repeat the mammogram bilaterally in 1 month and if findings are unchanged will do bilateral skin biopsies.  Discussed the findings with the patient questions answered       I spent 20 minutes in the professional and overall care of this patient.      Raheem Mcdonald MD

## 2024-01-08 NOTE — PATIENT INSTRUCTIONS
Thank you for choosing [facility].  Today's breast exam is unremarkable.  Findings on your mammogram may be due to the recent right subsequently came back has recently occurred.  We will repeat the mammograms and follow-up      Persistent changes bilateral skin biopsies will be performed at that time.  Should there be any change in the appearance of your breast or any concern for mass dimpling or nipple discharge please contact the breast clinic prior to your proposed imaging.

## 2024-02-08 ENCOUNTER — APPOINTMENT (OUTPATIENT)
Dept: DERMATOLOGY | Facility: CLINIC | Age: 64
End: 2024-02-08
Payer: COMMERCIAL

## 2024-02-14 ENCOUNTER — OFFICE VISIT (OUTPATIENT)
Dept: SURGERY | Facility: CLINIC | Age: 64
End: 2024-02-14
Payer: COMMERCIAL

## 2024-02-14 ENCOUNTER — HOSPITAL ENCOUNTER (OUTPATIENT)
Dept: RADIOLOGY | Facility: CLINIC | Age: 64
Discharge: HOME | End: 2024-02-14
Payer: COMMERCIAL

## 2024-02-14 VITALS
SYSTOLIC BLOOD PRESSURE: 107 MMHG | BODY MASS INDEX: 23.74 KG/M2 | RESPIRATION RATE: 20 BRPM | HEART RATE: 80 BPM | WEIGHT: 129 LBS | DIASTOLIC BLOOD PRESSURE: 66 MMHG | HEIGHT: 62 IN | TEMPERATURE: 97.9 F

## 2024-02-14 DIAGNOSIS — R92.8 ABNORMAL MAMMOGRAM OF BOTH BREASTS: Primary | ICD-10-CM

## 2024-02-14 DIAGNOSIS — R92.8 ABNORMAL FINDINGS ON DIAGNOSTIC IMAGING OF BREAST: ICD-10-CM

## 2024-02-14 PROCEDURE — 99213 OFFICE O/P EST LOW 20 MIN: CPT | Performed by: SURGERY

## 2024-02-14 PROCEDURE — 3074F SYST BP LT 130 MM HG: CPT | Performed by: SURGERY

## 2024-02-14 PROCEDURE — 1036F TOBACCO NON-USER: CPT | Performed by: SURGERY

## 2024-02-14 PROCEDURE — 77062 BREAST TOMOSYNTHESIS BI: CPT

## 2024-02-14 PROCEDURE — 3078F DIAST BP <80 MM HG: CPT | Performed by: SURGERY

## 2024-02-14 PROCEDURE — G0279 TOMOSYNTHESIS, MAMMO: HCPCS | Performed by: RADIOLOGY

## 2024-02-14 PROCEDURE — 77066 DX MAMMO INCL CAD BI: CPT | Performed by: RADIOLOGY

## 2024-02-14 PROCEDURE — 4010F ACE/ARB THERAPY RXD/TAKEN: CPT | Performed by: SURGERY

## 2024-02-14 ASSESSMENT — ENCOUNTER SYMPTOMS
OCCASIONAL FEELINGS OF UNSTEADINESS: 0
DEPRESSION: 0
LOSS OF SENSATION IN FEET: 0

## 2024-02-14 ASSESSMENT — PAIN SCALES - GENERAL: PAINLEVEL: 0-NO PAIN

## 2024-02-14 ASSESSMENT — COLUMBIA-SUICIDE SEVERITY RATING SCALE - C-SSRS
6. HAVE YOU EVER DONE ANYTHING, STARTED TO DO ANYTHING, OR PREPARED TO DO ANYTHING TO END YOUR LIFE?: NO
2. HAVE YOU ACTUALLY HAD ANY THOUGHTS OF KILLING YOURSELF?: NO
1. IN THE PAST MONTH, HAVE YOU WISHED YOU WERE DEAD OR WISHED YOU COULD GO TO SLEEP AND NOT WAKE UP?: NO

## 2024-02-14 ASSESSMENT — LIFESTYLE VARIABLES
HOW OFTEN DO YOU HAVE SIX OR MORE DRINKS ON ONE OCCASION: NEVER
SKIP TO QUESTIONS 9-10: 1
HOW OFTEN DO YOU HAVE A DRINK CONTAINING ALCOHOL: NEVER
HOW MANY STANDARD DRINKS CONTAINING ALCOHOL DO YOU HAVE ON A TYPICAL DAY: PATIENT DOES NOT DRINK
AUDIT-C TOTAL SCORE: 0

## 2024-02-14 NOTE — PROGRESS NOTES
History Of Present Illness  HPI follow-up skin changes seen on bilateral mammograms as noted in last month's visit.  However since her last visit patient now feels that the skin has returned to its normal appearance.  She denies any redness any swelling any drainage.  Mammograms today are now BI-RADS 2 with resolution of interstitial prominence and cutaneous thickening.     Past Medical History  She has a past medical history of Encounter for screening for malignant neoplasm of colon (09/17/2018), Essential (primary) hypertension (02/11/2019), Personal history of other diseases of the digestive system, Personal history of other diseases of the nervous system and sense organs, Personal history of other diseases of the respiratory system, Personal history of other diseases of urinary system, Personal history of other endocrine, nutritional and metabolic disease, Personal history of other endocrine, nutritional and metabolic disease (02/11/2019), Personal history of other endocrine, nutritional and metabolic disease (02/11/2019), Personal history of other endocrine, nutritional and metabolic disease (05/13/2019), Personal history of other endocrine, nutritional and metabolic disease (04/15/2019), Personal history of other endocrine, nutritional and metabolic disease (01/09/2019), Personal history of other endocrine, nutritional and metabolic disease (03/13/2018), Personal history of other medical treatment (03/01/2016), and Systemic lupus erythematosus, unspecified (CMS/HCC).    Surgical History  She has a past surgical history that includes Carpal tunnel release (09/17/2018); Hand tendon surgery (09/17/2018); Other surgical history (09/17/2018); Breast biopsy (Right); and Breast cyst aspiration (Right).     Social History  She reports that she has quit smoking. Her smoking use included cigarettes. She has never used smokeless tobacco. She reports that she does not currently use alcohol. She reports that she does not  use drugs.    Family History  Family History   Problem Relation Name Age of Onset    COPD Mother      Other (CORONARY ARTERIOSCLEROSIS) Father          Allergies  Esomeprazole and Penicillins    Review of Systems review of systems negative     Visit Vitals  /66   Pulse 80   Temp 36.6 °C (97.9 °F)   Resp 20        Physical Exam GENERAL  MENTAL STATUS - Alert. GENERAL APPEARANCE - Cooperative and well groomed. ORIENTATION - Oriented X4. BUILD & NUTRITION - Well nourished and well developed. HYDRATION - Well hydrated.    INTEGUMENTARY  GENERAL CHARACTERISTICS - Overall examination of the patient´s skin reveals no rashes. COLOR - not icteric. SKIN MOISTURE - normal skin moisture. TEMPERATURE - normal worth is noted.    HEAD AND NECK  HEAD  HEAD SHAPE - Atraumatic and normocephalic.  TRACHEA - midline.  THYROID  GLAND CHARACTERISTICS - normal size and consistency and no palpable nodules.    EYE  SCLERA/CONJUNCTIVA - BILATERAL - Anicteric.    CHEST AND LUNG EXAM  AUSCULTATION -  BREATH SOUNDS - Clear.    BREAST  NIPPLES: CHARACTERISTICS - LEFT - No rash. Not inverted. RIGHT - No rash. Not Inverted. DISCHARGE - LEFT - None. DISCHARGE - RIGHT - None.  BREAST - LEFT - Non tender. No mass, skin changes, biopsy scars, dimpling or dimpling or Peau d´Orange. RIGHT - Non tender.  Benign findings upper outer quadrant right breast skin changes, biopsy scars, dimpling or dimpling or Peau d´Orange. BILATERAL - Symmetric.    CARDIOVASCULAR  AUSCULTATION: RHYTHM - Regular. HEART SOUNDS - Normal heart sounds.  MURMURS & OTHER HEART SOUNDS - Auscultation of the heart reveals regular rate and no murmurs.    ABDOMEN  PALPATION/PERCUSSION - Palpation and percussion of the abdomen reveal soft, non tender, no mass and no hepatosplenomegaly.    LYMPHATIC  GENERAL LYMPHATICS  BREAST LYMPHATIC EXAM - No cervical adenopathy, supraclavicular adenopathy or axilliary adenopathy.         Last Recorded Vitals  Blood pressure 107/66, pulse 80,  "temperature 36.6 °C (97.9 °F), resp. rate 20, height 1.575 m (5' 2\"), weight 58.5 kg (129 lb).    Relevant Results      BI mammo bilateral diagnostic tomosynthesis    Result Date: 2/14/2024  Interpreted By:  Sage Arcos, STUDY: BI MAMMO BILATERAL DIAGNOSTIC TOMOSYNTHESIS;  2/14/2024 12:37 pm   ACCESSION NUMBER(S): [id number]   ORDERING CLINICIAN: RAHEEM BINGHAM   INDICATION: Signs/Symptoms:fu bilateral breast density.   COMPARISON: 12/08/2023, and 12/07/2022   FINDINGS: 2D and tomosynthesis images were reviewed at 1 mm slice thickness.   Density:  There are areas of scattered fibroglandular tissue.   A scar marker is present at the upper outer aspect of the right breast. A small mass in the upper outer aspect of the left breast is stable and considered benign. There has been resolution of interstitial prominence and cutaneous thickening on the previous examination, with a pattern now similar to the prior exam of 12/07/2022. This was probably due to generalized edema such as congestion, fluid overload, hypoalbuminemia etc. On the previous exam. Otherwise no suspicious masses or calcifications are identified, or significant interval change from that exam 2,022..       No mammographic evidence of malignancy.   BI-RADS CATEGORY:   BI-RADS Category:  2 Benign. Recommendation:  Annual Screening. Recommended Date:  1 Year. Laterality:  Bilateral.   For any future breast imaging appointments, please call [phone] ([phone]).     MACRO: None   Signed by: Sage Arcos 2/14/2024 12:47 PM Dictation workstation:   [id number]        @[email or web address]@    Assessment/Plan     Reviewed the diagnostic imaging.  Resolution of interstitial edema most likely related to fluid overload.  Explicitly discussed with patient need to seek immediate medical attention for any skin changes       I spent 15 minutes in the professional and overall care of this patient.      Raheem Bingham MD  "

## 2024-02-14 NOTE — PATIENT INSTRUCTIONS
Thank you for choosing [facility].  There has been resolution of the swelling of the skin of your breast.  Mammogram today demonstrates no evidence of abnormal finding.  Please continue to monitor the appearance of the breast.  Please seek immediate medical attention should the area change in any consistency redness swelling or drainage.  Annual mammogram has been recommended as well as monthly self-exam

## 2024-02-15 ENCOUNTER — OFFICE VISIT (OUTPATIENT)
Dept: PODIATRY | Facility: CLINIC | Age: 64
End: 2024-02-15
Payer: COMMERCIAL

## 2024-02-15 DIAGNOSIS — M20.5X1 ADDUCTOVARUS ROTATION OF TOE, ACQUIRED, RIGHT: ICD-10-CM

## 2024-02-15 DIAGNOSIS — R26.89 GAIT, ANTALGIC: ICD-10-CM

## 2024-02-15 DIAGNOSIS — L84 SOFT CORN: ICD-10-CM

## 2024-02-15 DIAGNOSIS — M20.11 HALLUX VALGUS, RIGHT: ICD-10-CM

## 2024-02-15 DIAGNOSIS — M79.671 PAIN IN BOTH FEET: Primary | ICD-10-CM

## 2024-02-15 DIAGNOSIS — M79.672 PAIN IN BOTH FEET: Primary | ICD-10-CM

## 2024-02-15 PROCEDURE — 99214 OFFICE O/P EST MOD 30 MIN: CPT | Performed by: PODIATRIST

## 2024-02-15 PROCEDURE — 4010F ACE/ARB THERAPY RXD/TAKEN: CPT | Performed by: PODIATRIST

## 2024-02-15 PROCEDURE — 1036F TOBACCO NON-USER: CPT | Performed by: PODIATRIST

## 2024-02-15 ASSESSMENT — ENCOUNTER SYMPTOMS
NEUROLOGICAL NEGATIVE: 1
HEMATOLOGIC/LYMPHATIC NEGATIVE: 1
CONSTITUTIONAL NEGATIVE: 1
ENDOCRINE COMMENTS: DM
PSYCHIATRIC NEGATIVE: 1

## 2024-02-15 NOTE — PROGRESS NOTES
Chief Complaint   Patient presents with    diabetic foot check     Patient is here today for diabetic foot check and review xrays ELI. She has a painful corn 5th toe right foot and callus.        Pain in the fifth digit right foot.  Not sure which part of the digit is coming from.  Overall improvement which she had prior visit.  Recently debrided her nails.  She is gone to wearing wider shoes.  This does help.  Sugars generally under control. She has no other complaints at this time.  No change in past medical history.  Remote history of smoking.    Review of Systems   Constitutional: Negative.    Eyes:         DM visual problems   Cardiovascular:         Murmur   Endocrine:        DM   Genitourinary: Negative.    Musculoskeletal:         Toe deformity.   Skin:         Corns and calluses.    Neurological: Negative.    Hematological: Negative.    Psychiatric/Behavioral: Negative.         General/Constitutional: Alert. NAD.   Respiratory: Non labored breathing.   Psychiatric: Mood and affect normal/baseline.   HEENT: Sclera clear. Wearing corrective lenses.  Dermatologic: Nails are minimally dystrophic and stable at this time.  Improved painful soft corn between the fourth and fifth digits on the fifth digit right foot.  Less pronounced than previous.  Noninfected noninflamed.  Minimal discomfort.  No acute inflammatory infectious process.  Additionally mild hyperkeratotic area plantar aspect left foot.  Noninfected noninflamed.  Mild pain on palpation of the area.  Web spaces are dry. No ulcers no pre-ulcerative areas other than mentioned above.  Vascular: Pedal pulses are intact and symmetric including the dorsalis pedis and the posterior tibial pulses. Feet are warm to touch.  Minimal nonpitting edema noted.    Neurological: Alert and oriented. No acute distress.  Gibbon Wilfredo monofilament testing normal.  Vibratory sensation mildly decreased.  Musculoskeletal: Strength is normal for age. No acute deficits  appreciated.  Hallux valgus deformity left greater than right foot.  Symptomatic at this time.  Adductovarus fifth digit right foot.  X-rays: Hallux valgus deformity.  Digital contractures.    Impression: Diabetes with mild neuropathy.  Self nail avulsion fifth digit right foot.  Painful hyperkeratotic area both feet.    -Today's treatment and course of therapy was discussed with the patient in detail. Patient's questions were answered. Proper foot care was discussed. This dictation was done using Dragon computer software and as such may contain grammatical errors.    -Reviewed diabetic foot care.  Avoid walking barefoot indoors.  Check your feet daily.  Do not use any corn or callus remover's on your feet.  Protective bandaging on the fifth digit right foot encouraged.    -Paring various areas on both feet.  Discussed protection of the fourth and fifth digits to minimize pressure between the toes.  Nonmedicated padding and also recommend wide with shoes.  Okay to use a pumice stone or a file but be cautious not to irritate her causing bleeding.    -Do not use any corn or callus remover.    -Reviewed x-rays in detail.    -Follow-up 3 months.

## 2024-02-22 DIAGNOSIS — R92.8 ABNORMAL MAMMOGRAM OF BOTH BREASTS: ICD-10-CM

## 2024-03-06 ENCOUNTER — APPOINTMENT (OUTPATIENT)
Dept: DERMATOLOGY | Facility: CLINIC | Age: 64
End: 2024-03-06
Payer: COMMERCIAL

## 2024-05-09 PROBLEM — R01.1 PANSYSTOLIC MURMUR: Status: ACTIVE | Noted: 2024-05-09

## 2024-05-09 PROBLEM — R26.89 ANTALGIC GAIT: Status: ACTIVE | Noted: 2024-05-09

## 2024-05-09 PROBLEM — E87.20 METABOLIC ACIDOSIS: Status: ACTIVE | Noted: 2024-05-09

## 2024-05-09 PROBLEM — M79.671 PAIN IN BOTH FEET: Status: ACTIVE | Noted: 2024-05-09

## 2024-05-09 PROBLEM — M79.672 PAIN IN BOTH FEET: Status: ACTIVE | Noted: 2024-05-09

## 2024-05-09 PROBLEM — E11.65 TYPE 2 DIABETES MELLITUS WITH HYPERGLYCEMIA (MULTI): Status: RESOLVED | Noted: 2023-09-06 | Resolved: 2024-05-09

## 2024-05-09 PROBLEM — E11.9 TYPE 2 DIABETES MELLITUS WITHOUT RETINOPATHY (MULTI): Status: RESOLVED | Noted: 2023-09-06 | Resolved: 2024-05-09

## 2024-05-09 PROBLEM — R41.82 ALTERED MENTAL STATUS: Status: ACTIVE | Noted: 2024-05-09

## 2024-05-23 PROBLEM — R26.89 ANTALGIC GAIT: Status: RESOLVED | Noted: 2024-05-09 | Resolved: 2024-05-23

## 2024-05-23 PROBLEM — I36.1 NONRHEUMATIC TRICUSPID VALVE REGURGITATION: Chronic | Status: ACTIVE | Noted: 2024-05-23

## 2024-05-23 PROBLEM — E11.9 TYPE 2 DIABETES MELLITUS (MULTI): Chronic | Status: ACTIVE | Noted: 2023-11-06

## 2024-05-23 PROBLEM — K22.81 ESOPHAGEAL POLYP: Status: RESOLVED | Noted: 2023-11-06 | Resolved: 2024-05-23

## 2024-05-23 PROBLEM — I31.39 PERICARDIAL EFFUSION (HHS-HCC): Chronic | Status: ACTIVE | Noted: 2024-05-23

## 2024-05-23 PROBLEM — H02.889 MEIBOMIAN GLAND DYSFUNCTION (MGD): Status: RESOLVED | Noted: 2023-11-06 | Resolved: 2024-05-23

## 2024-05-23 PROBLEM — H04.123 DRY EYE SYNDROME OF BOTH EYES: Status: RESOLVED | Noted: 2023-11-06 | Resolved: 2024-05-23

## 2024-05-23 PROBLEM — K22.2 ESOPHAGEAL STENOSIS: Chronic | Status: ACTIVE | Noted: 2023-09-06

## 2024-05-23 PROBLEM — H53.8 BLURRED VISION: Status: RESOLVED | Noted: 2023-11-06 | Resolved: 2024-05-23

## 2024-05-23 PROBLEM — I10 HYPERTENSION: Chronic | Status: ACTIVE | Noted: 2023-11-06

## 2024-05-23 PROBLEM — R09.81 NASAL CONGESTION: Status: RESOLVED | Noted: 2023-01-20 | Resolved: 2024-05-23

## 2024-05-24 ENCOUNTER — OFFICE VISIT (OUTPATIENT)
Dept: CARDIOLOGY | Facility: CLINIC | Age: 64
End: 2024-05-24
Payer: COMMERCIAL

## 2024-05-24 VITALS
BODY MASS INDEX: 25.79 KG/M2 | SYSTOLIC BLOOD PRESSURE: 138 MMHG | WEIGHT: 141 LBS | HEART RATE: 83 BPM | OXYGEN SATURATION: 97 % | DIASTOLIC BLOOD PRESSURE: 84 MMHG

## 2024-05-24 DIAGNOSIS — I31.39 PERICARDIAL EFFUSION (HHS-HCC): Chronic | ICD-10-CM

## 2024-05-24 DIAGNOSIS — I10 PRIMARY HYPERTENSION: Primary | Chronic | ICD-10-CM

## 2024-05-24 DIAGNOSIS — I36.1 NONRHEUMATIC TRICUSPID VALVE REGURGITATION: Chronic | ICD-10-CM

## 2024-05-24 DIAGNOSIS — E78.00 HYPERCHOLESTEREMIA: Chronic | ICD-10-CM

## 2024-05-24 PROCEDURE — 3079F DIAST BP 80-89 MM HG: CPT | Performed by: INTERNAL MEDICINE

## 2024-05-24 PROCEDURE — 1036F TOBACCO NON-USER: CPT | Performed by: INTERNAL MEDICINE

## 2024-05-24 PROCEDURE — 3075F SYST BP GE 130 - 139MM HG: CPT | Performed by: INTERNAL MEDICINE

## 2024-05-24 PROCEDURE — 4010F ACE/ARB THERAPY RXD/TAKEN: CPT | Performed by: INTERNAL MEDICINE

## 2024-05-24 PROCEDURE — 99204 OFFICE O/P NEW MOD 45 MIN: CPT | Performed by: INTERNAL MEDICINE

## 2024-05-24 PROCEDURE — 93000 ELECTROCARDIOGRAM COMPLETE: CPT | Performed by: INTERNAL MEDICINE

## 2024-05-24 NOTE — PROGRESS NOTES
Referred by No ref. provider found    HPI I am seeing Ivelisse for evaluation of valvular heart disease pericardial effusion.  She was admitted in October with diabetic complications.  A murmur was heard.  Echo revealed moderate to severe tricuspid regurgitation and a small to moderate-sized pericardial effusion.  Read by Dr. Kerr.  She has no cardiac symptoms.  No chest pain or pressure no shortness of breath no palpitations.  She did have lupus in the past with complications.  She has hypertension hyperlipidemia and diabetes.  Physically she is active.  She exercises on a good result for many hours a day.    Past Medical History:  Problem List Items Addressed This Visit    None       Past Medical History:   Diagnosis Date    Encounter for screening for malignant neoplasm of colon 09/17/2018    Screening for colon cancer    Essential (primary) hypertension 02/11/2019    HTN (hypertension), benign    Personal history of other diseases of the digestive system     History of esophageal reflux    Personal history of other diseases of the nervous system and sense organs     History of cluster headache    Personal history of other diseases of the respiratory system     History of pleurisy    Personal history of other diseases of urinary system     History of bladder problems    Personal history of other endocrine, nutritional and metabolic disease     History of hypothyroidism    Personal history of other endocrine, nutritional and metabolic disease 02/11/2019    History of type 2 diabetes mellitus    Personal history of other endocrine, nutritional and metabolic disease 02/11/2019    History of hypothyroidism    Personal history of other endocrine, nutritional and metabolic disease 05/13/2019    History of type 2 diabetes mellitus    Personal history of other endocrine, nutritional and metabolic disease 04/15/2019    History of type 2 diabetes mellitus    Personal history of other endocrine, nutritional and metabolic  "disease 01/09/2019    History of type 2 diabetes mellitus    Personal history of other endocrine, nutritional and metabolic disease 03/13/2018    History of hypothyroidism    Personal history of other medical treatment 03/01/2016    History of screening mammography    Systemic lupus erythematosus, unspecified (Multi)     History of systemic lupus erythematosus (SLE)       Past Surgical History:  She has a past surgical history that includes Carpal tunnel release (09/17/2018); Hand tendon surgery (09/17/2018); Other surgical history (09/17/2018); Breast biopsy (Right); and Breast cyst aspiration (Right).      Social History:  She reports that she has quit smoking. Her smoking use included cigarettes. She has never used smokeless tobacco. She reports that she does not currently use alcohol. She reports that she does not use drugs.    Family History:  Family History   Problem Relation Name Age of Onset    COPD Mother      Other (CORONARY ARTERIOSCLEROSIS) Father          Allergies:  Esomeprazole and Penicillins    Outpatient Medications:  Current Outpatient Medications   Medication Instructions    albuterol 90 mcg/actuation inhaler 2 puffs, inhalation, Every 4 hours PRN    albuterol 108 mcg, inhalation    atorvastatin (Lipitor) 40 mg tablet Take 0.5 tablets (20 mg) by mouth once daily.    BD Ultra-Fine Mini Pen Needle 31 gauge x 3/16\" needle 4 times daily    blood sugar diagnostic strip [place] touch verio test strips, See Instructions, test bid due to hyperglycemia    blood sugar diagnostic strip miscellaneous, 2 times daily, ACCU CHEK QUENTIN PLUS IN VITRO STRIP     insulin glargine (LANTUS) 14 Units, subcutaneous, Every 24 hours, Take as directed per insulin instructions.    insulin lispro (HUMALOG) 4 Units, subcutaneous, 3 times daily (morning, midday, late afternoon), Take as directed per insulin instructions.    insulin lispro (HUMALOG) 0-10 Units, subcutaneous, 4 times daily Insulin, Take as directed per insulin " "instructions.    lancets misc  accucek rosetta plus lancets    Lantus Solostar U-100 Insulin 10 Units, subcutaneous, See admin instructions, AT 9PM     levothyroxine (Synthroid, Levoxyl) 100 mcg tablet oral, See admin instructions, TAKE 1 TABLET BY MOUTH DAILY, 1/2 once a week    lisinopril 5 mg, oral, Daily    needles, disposable (NEEDLE, DISP, 31 GAUGE MISC) B-D U/F PEN NEEDLE [id number]    omeprazole (PRILOSEC) 20 mg, oral, Daily    OneTouch Delica Plus Lancet 33 gauge misc 4 times daily    OneTouch Ultra Test strip 4 times daily        Last Recorded Vitals:  There were no vitals filed for this visit.    Physical Exam    Physical  Patient is alert and oriented x3.  HEENT is unremarkable mucous members are moist  Neck no JVP no bruits upstrokes are full no thyromegaly  Lungs are clear bilaterally.  No wheezing crackles or rales  Heart regular rhythm normal S1-S2 there is no S3 no murmurs are heard.  Abdomen is soft vessels are positive nontender nondistended no organomegaly no pulsatile masses  Extremities have no edema.  Distal pulses present palpable.  Neuro is grossly nonfocal  Skin has no rashes     Last Labs:  CBC -  Lab Results   Component Value Date    WBC 6.1 10/25/2023    HGB 10.4 (L) 10/25/2023    HCT 31.4 (L) 10/25/2023     (H) 10/25/2023     10/25/2023       CMP -  Lab Results   Component Value Date    CALCIUM 8.7 10/25/2023    PROT 7.5 10/19/2023    ALBUMIN 4.1 10/19/2023    AST 18 10/19/2023    ALT 18 10/19/2023    ALKPHOS 104 10/19/2023    BILITOT 0.3 10/19/2023       LIPID PANEL -   No results found for: \"CHOL\", \"HDL\", \"CHHDL\", \"LDL\", \"VLDL\", \"TRIG\", \"NHDL\"    RENAL FUNCTION PANEL -   Lab Results   Component Value Date    K 4.1 10/25/2023       Lab Results   Component Value Date    HGBA1C 15.3 (H) 10/20/2023     Procedures    Echo 10/20/2023 EF 65%, small to moderate pericardial effusion, MVT, moderate-severe TR, small LV size    CAC 2020 Zero       CT chest 4/10/2019 no coronary " calcification stated    Assessment/Plan     1.  Pericardial effusion.  Small to moderate October 2023.  Repeat echo to be done.  I do not know the etiology whether it is related to her lupus.    2.  Tricuspid regurgitation moderate to severe. Will reassess on the echo. No murmur on exam to confirm this.       3.  She had a calcium score done in 2020 that came back at 0    EKG today. Echo. RTC 3-4 months  Rico Adair MD     Instructions and follow up

## 2024-05-24 NOTE — PATIENT INSTRUCTIONS
1.  Pericardial effusion.  Small to moderate October 2023.  Repeat echo to be done.  I do not know the etiology whether it is related to her lupus.    2.  Tricuspid regurgitation moderate to severe. Will reassess on the echo. No murmur on exam to confirm this.       3.  She had a calcium score done in 2020 that came back at 0    EKG today. Echo. RTC 3-4 months

## 2024-05-28 ENCOUNTER — APPOINTMENT (OUTPATIENT)
Dept: CARDIOLOGY | Facility: CLINIC | Age: 64
End: 2024-05-28
Payer: COMMERCIAL

## 2024-06-04 ENCOUNTER — APPOINTMENT (OUTPATIENT)
Dept: PODIATRY | Facility: CLINIC | Age: 64
End: 2024-06-04
Payer: COMMERCIAL

## 2024-08-26 ENCOUNTER — HOSPITAL ENCOUNTER (OUTPATIENT)
Dept: CARDIOLOGY | Facility: CLINIC | Age: 64
Discharge: HOME | End: 2024-08-26
Payer: COMMERCIAL

## 2024-08-26 VITALS
SYSTOLIC BLOOD PRESSURE: 138 MMHG | WEIGHT: 141 LBS | BODY MASS INDEX: 25.95 KG/M2 | DIASTOLIC BLOOD PRESSURE: 84 MMHG | HEIGHT: 62 IN

## 2024-08-26 DIAGNOSIS — I31.39 PERICARDIAL EFFUSION (HHS-HCC): Chronic | ICD-10-CM

## 2024-08-26 DIAGNOSIS — I07.1 RHEUMATIC TRICUSPID INSUFFICIENCY: ICD-10-CM

## 2024-08-26 DIAGNOSIS — I36.1 NONRHEUMATIC TRICUSPID VALVE REGURGITATION: Chronic | ICD-10-CM

## 2024-08-26 PROCEDURE — 93306 TTE W/DOPPLER COMPLETE: CPT

## 2024-08-26 PROCEDURE — 93306 TTE W/DOPPLER COMPLETE: CPT | Performed by: INTERNAL MEDICINE

## 2024-08-27 LAB
AORTIC VALVE MEAN GRADIENT: 3.8 MMHG
AORTIC VALVE PEAK VELOCITY: 1.47 M/S
AV PEAK GRADIENT: 8.7 MMHG
AVA (PEAK VEL): 2.03 CM2
AVA (VTI): 2.28 CM2
EJECTION FRACTION APICAL 4 CHAMBER: 54.4
EJECTION FRACTION: 58 %
LEFT ATRIUM VOLUME AREA LENGTH INDEX BSA: 20.3 ML/M2
LEFT VENTRICLE INTERNAL DIMENSION DIASTOLE: 3.96 CM (ref 3.5–6)
LEFT VENTRICULAR OUTFLOW TRACT DIAMETER: 1.81 CM
MITRAL VALVE E/A RATIO: 0.82
RIGHT VENTRICLE FREE WALL PEAK S': 1.2 CM/S
RIGHT VENTRICLE PEAK SYSTOLIC PRESSURE: 25.8 MMHG
TRICUSPID ANNULAR PLANE SYSTOLIC EXCURSION: 2.7 CM

## 2024-09-14 PROBLEM — E78.5 HYPERLIPIDEMIA: Status: ACTIVE | Noted: 2024-05-24

## 2024-09-14 PROBLEM — Z86.39 HISTORY OF HYPOTHYROIDISM: Status: RESOLVED | Noted: 2023-11-06 | Resolved: 2024-09-14

## 2024-10-08 ENCOUNTER — APPOINTMENT (OUTPATIENT)
Dept: CARDIOLOGY | Facility: CLINIC | Age: 64
End: 2024-10-08
Payer: COMMERCIAL

## 2024-10-08 VITALS
DIASTOLIC BLOOD PRESSURE: 80 MMHG | BODY MASS INDEX: 26.16 KG/M2 | OXYGEN SATURATION: 97 % | SYSTOLIC BLOOD PRESSURE: 122 MMHG | HEART RATE: 79 BPM | WEIGHT: 143 LBS

## 2024-10-08 DIAGNOSIS — I10 PRIMARY HYPERTENSION: Chronic | ICD-10-CM

## 2024-10-08 DIAGNOSIS — I31.39 PERICARDIAL EFFUSION (HHS-HCC): Chronic | ICD-10-CM

## 2024-10-08 DIAGNOSIS — I36.1 NONRHEUMATIC TRICUSPID VALVE REGURGITATION: Chronic | ICD-10-CM

## 2024-10-08 DIAGNOSIS — E78.2 MIXED HYPERLIPIDEMIA: Primary | ICD-10-CM

## 2024-10-08 PROBLEM — R41.82 ALTERED MENTAL STATUS: Status: RESOLVED | Noted: 2024-05-09 | Resolved: 2024-10-08

## 2024-10-08 PROBLEM — R32 URINARY INCONTINENCE: Status: RESOLVED | Noted: 2023-11-06 | Resolved: 2024-10-08

## 2024-10-08 PROBLEM — R63.1 POLYDIPSIA: Status: RESOLVED | Noted: 2023-11-06 | Resolved: 2024-10-08

## 2024-10-08 PROBLEM — E87.20 METABOLIC ACIDOSIS: Status: RESOLVED | Noted: 2024-05-09 | Resolved: 2024-10-08

## 2024-10-08 PROBLEM — R35.0 FREQUENCY OF MICTURITION: Status: RESOLVED | Noted: 2023-11-06 | Resolved: 2024-10-08

## 2024-10-08 PROBLEM — R94.5 ABNORMAL LIVER FUNCTION: Status: RESOLVED | Noted: 2023-11-06 | Resolved: 2024-10-08

## 2024-10-08 PROCEDURE — 3079F DIAST BP 80-89 MM HG: CPT | Performed by: INTERNAL MEDICINE

## 2024-10-08 PROCEDURE — 1036F TOBACCO NON-USER: CPT | Performed by: INTERNAL MEDICINE

## 2024-10-08 PROCEDURE — 3074F SYST BP LT 130 MM HG: CPT | Performed by: INTERNAL MEDICINE

## 2024-10-08 PROCEDURE — 99213 OFFICE O/P EST LOW 20 MIN: CPT | Performed by: INTERNAL MEDICINE

## 2024-10-08 PROCEDURE — 4010F ACE/ARB THERAPY RXD/TAKEN: CPT | Performed by: INTERNAL MEDICINE

## 2024-10-08 RX ORDER — INSULIN ASPART 100 [IU]/ML
INJECTION, SOLUTION INTRAVENOUS; SUBCUTANEOUS
COMMUNITY
Start: 2024-07-08

## 2024-10-08 NOTE — PROGRESS NOTES
Referred by No ref. provider found    Dalia seeing Ivelisse for a 4 month follow up. NO CP/SOB. Mild fatigue. Very active with exercises.    Past Medical History:  Problem List Items Addressed This Visit    None     Past Medical History:   Diagnosis Date    Encounter for screening for malignant neoplasm of colon 09/17/2018    Screening for colon cancer    Esophageal stenosis 09/06/2023    Essential (primary) hypertension 02/11/2019    HTN (hypertension), benign    Hypercholesteremia 05/24/2024    Hypertension 11/06/2023 2019    Nonrheumatic tricuspid valve regurgitation 05/23/2024    Pericardial effusion ([facility]) 05/23/2024    Personal history of other diseases of the digestive system     History of esophageal reflux    Personal history of other diseases of the nervous system and sense organs     History of cluster headache    Personal history of other diseases of the respiratory system     History of pleurisy    Personal history of other diseases of urinary system     History of bladder problems    Personal history of other endocrine, nutritional and metabolic disease     History of hypothyroidism    Personal history of other endocrine, nutritional and metabolic disease 02/11/2019    History of type 2 diabetes mellitus    Personal history of other endocrine, nutritional and metabolic disease 02/11/2019    History of hypothyroidism    Personal history of other endocrine, nutritional and metabolic disease 05/13/2019    History of type 2 diabetes mellitus    Personal history of other endocrine, nutritional and metabolic disease 04/15/2019    History of type 2 diabetes mellitus    Personal history of other endocrine, nutritional and metabolic disease 01/09/2019    History of type 2 diabetes mellitus    Personal history of other endocrine, nutritional and metabolic disease 03/13/2018    History of hypothyroidism    Personal history of other medical treatment 03/01/2016    History of screening mammography     "Pulmonary disease due to systemic lupus erythematosus (SLE) (Multi) 02/29/2016    Formatting of this note might be different from the original. 2003- Dr. Pennington  2003- Dr. Pennington    Systemic lupus erythematosus (Multi) 02/29/2016    Systemic lupus erythematosus, unspecified (Multi)     History of systemic lupus erythematosus (SLE)    Type 2 diabetes mellitus (Multi) 11/06/2023     Past Surgical History:  She has a past surgical history that includes Carpal tunnel release (09/17/2018); Hand tendon surgery (09/17/2018); Other surgical history (09/17/2018); Breast biopsy (Right); Breast cyst aspiration (Right); and Tonsillectomy.      Social History:  She reports that she quit smoking about 19 years ago. Her smoking use included cigarettes. She has never used smokeless tobacco. She reports that she does not currently use alcohol. She reports that she does not use drugs.    Family History:  Family History   Problem Relation Name Age of Onset    COPD Mother      Other (CORONARY ARTERIOSCLEROSIS) Father       Allergies:  Esomeprazole and Penicillins    Outpatient Medications:  Current Outpatient Medications   Medication Instructions    albuterol 90 mcg/actuation inhaler 2 puffs, inhalation, Every 4 hours PRN    albuterol 108 mcg, inhalation    atorvastatin (Lipitor) 40 mg tablet Take 0.5 tablets (20 mg) by mouth once daily.    BD Ultra-Fine Mini Pen Needle 31 gauge x 3/16\" needle 4 times daily    blood sugar diagnostic strip [place] touch verio test strips, See Instructions, test bid due to hyperglycemia    insulin glargine (LANTUS) 14 Units, subcutaneous, Every 24 hours, Take as directed per insulin instructions.    insulin lispro (HUMALOG) 4 Units, subcutaneous, 3 times daily (morning, midday, late afternoon), Take as directed per insulin instructions.    insulin lispro (HUMALOG) 0-10 Units, subcutaneous, 4 times daily Insulin, Take as directed per insulin instructions.    lancets misc  accucek rosetta plus lancets    " "Lantus Solostar U-100 Insulin 10 Units, subcutaneous, See admin instructions, AT 9PM     levothyroxine (Synthroid, Levoxyl) 100 mcg tablet oral, See admin instructions, TAKE 1 TABLET BY MOUTH DAILY, 1/2 once a week    lisinopril 5 mg, oral, Daily    needles, disposable (NEEDLE, DISP, 31 GAUGE MISC) B-D U/F PEN NEEDLE [id number]    omeprazole (PRILOSEC) 20 mg, oral, Daily    OneTouch Delica Plus Lancet 33 gauge misc 4 times daily    OneTouch Ultra Test strip 4 times daily     Last Recorded Vitals:  There were no vitals filed for this visit.    Physical Exam  Patient is alert and oriented x3.  HEENT is unremarkable mucous members are moist  Neck no JVP no bruits upstrokes are full no thyromegaly  Lungs are clear bilaterally.  No wheezing crackles or rales  Heart regular rhythm normal S1-S2 there is no S3 no murmurs are heard.  Abdomen is soft bs are positive nontender nondistended no organomegaly no pulsatile masses  Extremities have no edema.  Distal pulses present palpable.  Neuro is grossly nonfocal  Skin has no rashes     Last Labs:  CBC -  Lab Results   Component Value Date    WBC 6.1 10/25/2023    HGB 10.4 (L) 10/25/2023    HCT 31.4 (L) 10/25/2023     (H) 10/25/2023     10/25/2023     CMP -  Lab Results   Component Value Date    CALCIUM 8.7 10/25/2023    PROT 7.5 10/19/2023    ALBUMIN 4.1 10/19/2023    AST 18 10/19/2023    ALT 18 10/19/2023    ALKPHOS 104 10/19/2023    BILITOT 0.3 10/19/2023     LIPID PANEL -   No results found for: \"CHOL\", \"HDL\", \"CHHDL\", \"LDL\", \"VLDL\", \"TRIG\", \"NHDL\"    RENAL FUNCTION PANEL -   Lab Results   Component Value Date    K 4.1 10/25/2023     Lab Results   Component Value Date    HGBA1C 15.3 (H) 10/20/2023     Procedures    Echo 8/26/24 EF 55-60%, DDF, No PE    Echo 10/20/2023 EF 65%, small to moderate pericardial effusion, MVT, moderate-severe TR, small LV size    CAC 2020 Zero       CT chest 4/10/2019 no coronary calcification stated    Assessment/Plan     1.  " Pericardial effusion.  Small to moderate October 2023. Repeat echo 8/2024 w/o effusion. ? Related to Lupus. She was admitted with DKA at that time.     2.  Tricuspid regurgitation moderate to severe. On the repeat echo, the TR is resolved.     3.  She had a calcium score done in 2020 that came back at 0. Repeat CAC in June 2025.    RTC 1 year. CAC June 2025.     Rico Adair MD     Instructions and follow up

## 2024-10-08 NOTE — PATIENT INSTRUCTIONS
1.  Pericardial effusion.  Small to moderate October 2023. Repeat echo 8/2024 w/o effusion. ? Related to Lupus. She was admitted with DKA at that time.     2.  Tricuspid regurgitation moderate to severe. On the repeat echo, the TR is resolved.     3.  She had a calcium score done in 2020 that came back at 0. Repeat CAC in June 2025.    RTC 1 year. CAC June 2025.

## 2024-10-16 ENCOUNTER — HOSPITAL ENCOUNTER (OUTPATIENT)
Dept: RADIOLOGY | Facility: CLINIC | Age: 64
Discharge: HOME | End: 2024-10-16
Payer: COMMERCIAL

## 2024-10-16 DIAGNOSIS — Z78.0 ASYMPTOMATIC MENOPAUSAL STATE: ICD-10-CM

## 2024-10-16 PROCEDURE — 77080 DXA BONE DENSITY AXIAL: CPT

## 2024-10-16 PROCEDURE — 77080 DXA BONE DENSITY AXIAL: CPT | Performed by: RADIOLOGY

## 2024-10-18 ENCOUNTER — APPOINTMENT (OUTPATIENT)
Dept: PODIATRY | Facility: CLINIC | Age: 64
End: 2024-10-18
Payer: COMMERCIAL

## 2024-12-06 ENCOUNTER — TELEPHONE (OUTPATIENT)
Dept: SURGERY | Facility: CLINIC | Age: 64
End: 2024-12-06

## 2024-12-09 ENCOUNTER — APPOINTMENT (OUTPATIENT)
Dept: RADIOLOGY | Facility: CLINIC | Age: 64
End: 2024-12-09
Payer: COMMERCIAL

## 2024-12-09 ENCOUNTER — APPOINTMENT (OUTPATIENT)
Dept: SURGERY | Facility: CLINIC | Age: 64
End: 2024-12-09
Payer: COMMERCIAL

## 2025-02-17 ENCOUNTER — HOSPITAL ENCOUNTER (OUTPATIENT)
Dept: RADIOLOGY | Facility: CLINIC | Age: 65
Discharge: HOME | End: 2025-02-17
Payer: COMMERCIAL

## 2025-02-17 DIAGNOSIS — R92.8 ABNORMAL MAMMOGRAM OF BOTH BREASTS: ICD-10-CM

## 2025-02-17 PROCEDURE — 77062 BREAST TOMOSYNTHESIS BI: CPT

## 2025-02-17 PROCEDURE — 77066 DX MAMMO INCL CAD BI: CPT | Performed by: RADIOLOGY

## 2025-02-17 PROCEDURE — 77062 BREAST TOMOSYNTHESIS BI: CPT | Performed by: RADIOLOGY

## 2025-04-11 ENCOUNTER — HOSPITAL ENCOUNTER (OUTPATIENT)
Dept: RADIOLOGY | Facility: HOSPITAL | Age: 65
Discharge: HOME | End: 2025-04-11
Payer: COMMERCIAL

## 2025-04-11 DIAGNOSIS — E78.2 MIXED HYPERLIPIDEMIA: ICD-10-CM

## 2025-04-11 PROCEDURE — 75571 CT HRT W/O DYE W/CA TEST: CPT

## 2025-05-12 ENCOUNTER — APPOINTMENT (OUTPATIENT)
Dept: ORTHOPEDIC SURGERY | Facility: CLINIC | Age: 65
End: 2025-05-12
Payer: COMMERCIAL